# Patient Record
Sex: FEMALE | Race: WHITE | Employment: OTHER | URBAN - METROPOLITAN AREA
[De-identification: names, ages, dates, MRNs, and addresses within clinical notes are randomized per-mention and may not be internally consistent; named-entity substitution may affect disease eponyms.]

---

## 2009-09-25 LAB
CHOLEST SERPL-MCNC: 190 MG/DL
HDLC SERPL-MCNC: 80 MG/DL
LDLC SERPL CALC-MCNC: 93 MG/DL
TRIGL SERPL-MCNC: 85 MG/DL

## 2009-09-26 LAB — SYPHILIS RPR SCREEN - HISTORICAL: NORMAL

## 2016-12-13 LAB — PHQ9 SCORE: 24

## 2017-01-03 ENCOUNTER — TELEPHONE (OUTPATIENT)
Dept: FAMILY MEDICINE | Facility: CLINIC | Age: 82
End: 2017-01-03

## 2017-01-03 ENCOUNTER — OFFICE VISIT (OUTPATIENT)
Dept: FAMILY MEDICINE | Facility: CLINIC | Age: 82
End: 2017-01-03
Payer: COMMERCIAL

## 2017-01-03 VITALS
BODY MASS INDEX: 30.27 KG/M2 | WEIGHT: 155 LBS | DIASTOLIC BLOOD PRESSURE: 78 MMHG | RESPIRATION RATE: 18 BRPM | TEMPERATURE: 97.2 F | SYSTOLIC BLOOD PRESSURE: 139 MMHG | HEART RATE: 65 BPM

## 2017-01-03 DIAGNOSIS — R31.9 BLOOD IN URINE: Primary | ICD-10-CM

## 2017-01-03 DIAGNOSIS — F32.0 MAJOR DEPRESSIVE DISORDER, SINGLE EPISODE, MILD (H): ICD-10-CM

## 2017-01-03 DIAGNOSIS — F41.1 GENERALIZED ANXIETY DISORDER: ICD-10-CM

## 2017-01-03 LAB
ALBUMIN UR-MCNC: NEGATIVE MG/DL
APPEARANCE UR: CLEAR
BACTERIA #/AREA URNS HPF: ABNORMAL /HPF
BILIRUB UR QL STRIP: NEGATIVE
COLOR UR AUTO: YELLOW
GLUCOSE UR STRIP-MCNC: NEGATIVE MG/DL
HGB UR QL STRIP: ABNORMAL
KETONES UR STRIP-MCNC: NEGATIVE MG/DL
LEUKOCYTE ESTERASE UR QL STRIP: ABNORMAL
NITRATE UR QL: NEGATIVE
NON-SQ EPI CELLS #/AREA URNS LPF: ABNORMAL /LPF
PH UR STRIP: 6.5 PH (ref 5–7)
RBC #/AREA URNS AUTO: ABNORMAL /HPF (ref 0–2)
SP GR UR STRIP: 1.01 (ref 1–1.03)
URN SPEC COLLECT METH UR: ABNORMAL
UROBILINOGEN UR STRIP-ACNC: 0.2 EU/DL (ref 0.2–1)
WBC #/AREA URNS AUTO: ABNORMAL /HPF (ref 0–2)

## 2017-01-03 PROCEDURE — 81001 URINALYSIS AUTO W/SCOPE: CPT | Performed by: FAMILY MEDICINE

## 2017-01-03 PROCEDURE — 87086 URINE CULTURE/COLONY COUNT: CPT | Mod: 90 | Performed by: FAMILY MEDICINE

## 2017-01-03 PROCEDURE — 99000 SPECIMEN HANDLING OFFICE-LAB: CPT | Performed by: FAMILY MEDICINE

## 2017-01-03 PROCEDURE — 99214 OFFICE O/P EST MOD 30 MIN: CPT | Performed by: FAMILY MEDICINE

## 2017-01-03 NOTE — PATIENT INSTRUCTIONS
Thank you for choosing [facility].  You may be receiving a survey in the mail from [facility] regarding your visit today.  Please take a few minutes to complete and return the survey to let us know how we are doing.      Our Clinic hours are:  Mondays    7:20 am - 7 pm  Tues -  Fri  7:20 am - 5 pm    Clinic Phone: [phone]    The clinic lab opens at 7:30 am Mon - Fri and appointments are required.    [facility]. [phone]  Monday-Thursday 8 am - 7pm  Tues/Wed/Fri 8 am - 5:30 pm

## 2017-01-03 NOTE — NURSING NOTE
Initial /78 mmHg  Pulse 65  Temp(Src) 97.2  F (36.2  C)  Resp 18  Wt 155 lb (70.308 kg) Estimated body mass index is 30.27 kg/(m^2) as calculated from the following:    Height as of 12/10/16: 5' (1.524 m).    Weight as of this encounter: 155 lb (70.308 kg). .    Chief Complaint   Patient presents with     Urinary Problem     Jennifer Valdez CMA

## 2017-01-03 NOTE — PROGRESS NOTES
SUBJECTIVE:                                                    Lara Wills is a 89 year old female who presents to clinic today for the following health issues:      URINARY TRACT SYMPTOMS     Onset: 3 days     Description:   Painful urination (Dysuria): no   Blood in urine (Hematuria): YES-  Patient is not sure but there is blood on the toilet paper   Delay in urine (Hesitency): no     Intensity: mild    Progression of Symptoms:  same    Accompanying Signs & Symptoms:  Fever/chills: no   Flank pain no   Nausea and vomiting: no   Any vaginal symptoms: none  Abdominal/Pelvic Pain: YES   History:   History of frequent UTI's: no   History of kidney stones: no   Sexually Active: no   Possibility of pregnancy: No    Precipitating factors:   none         Therapies Tried and outcome: Increase fluid intake            Problem list and histories reviewed & adjusted, as indicated.  Additional history: as documented    Further history obtained, clarified or corrected by physician:  She comes in with her daughter-in-law for some abdominal cramping this morning and over the last few days. There is concern about possible UTI. Fairly quickly in the visit it became clear that the main issue for her is progressive worsening of her overall health condition and frequent complaints and concerns that are taxing her family. She admits that she's feeling fearful living alone and that raises her stress level and her anxiety. She thinks she should move to a nursing home but she's not sure if she would like that. The daughter-in-law believes transitional care would be a reasonable option for her and the patient agrees thinking that she could get monitoring by nursing staff, perhaps medication adjustments, and physical therapy which would all be of benefit for her.    OBJECTIVE:  LUNGS: clear to auscultation, normal breath sounds  CV: RRR without murmur  ABD: BS+, soft, nontender, no masses, no hepatosplenomegaly    UA: Few white cells and  few bacteria    ASSESSMENT:     Blood in urine  Generalized anxiety disorder  Major depressive disorder, single episode, mild (H)    PLAN:  Orders Placed This Encounter     UA reflex to Microscopic and Culture     Urine Microscopic     CARE COORDINATION REFERRAL     We had a long discussion about disposition, goals of care, plan of care options. I did put in another care coordination referral in the daughter-in-law has a phone message in to our care coordinators to try to arrange for transitional care unit admission. She likely would benefit from physical therapy at TCU as well as monitoring by nursing staff. At the very least a TCU stay may also point out whether or not she truly can get stronger and healthier. She understands in the daughter-in-law understands that this may lead to permanent placement.    40 minutes spent with patient face-to-face, 30 minutes for counseling about her overall condition, prognosis, options for care.

## 2017-01-03 NOTE — TELEPHONE ENCOUNTER
Reason for Call:  Other appointment    Detailed comments: pt's daughter in law calling stating that pt calls everyday that something is wrong. She states that she is now complaining of low abdominal pain, like menstrual cramps with some bleeding. She isn't sure if she has a uti or what's going on. She is frustrated and doesn't know how to help pt anymore and is wondering if she needs transitional care. She said she just doesn't know how to handle this anymore. She stated pt has an appt this afternoon.    Phone Number Patient can be reached at: Other phone number:  [phone]    Best Time: any    Can we leave a detailed message on this number? YES    Call taken on 1/3/2017 at 10:03 AM by Margarita Steward

## 2017-01-04 ENCOUNTER — CARE COORDINATION (OUTPATIENT)
Dept: CARE COORDINATION | Facility: CLINIC | Age: 82
End: 2017-01-04

## 2017-01-05 ENCOUNTER — TELEPHONE (OUTPATIENT)
Dept: CARE COORDINATION | Facility: CLINIC | Age: 82
End: 2017-01-05

## 2017-01-05 DIAGNOSIS — F41.1 GENERALIZED ANXIETY DISORDER: ICD-10-CM

## 2017-01-05 DIAGNOSIS — G47.00 PERSISTENT INSOMNIA: Primary | ICD-10-CM

## 2017-01-05 DIAGNOSIS — E78.5 HYPERLIPIDEMIA WITH TARGET LDL LESS THAN 130: ICD-10-CM

## 2017-01-05 LAB
BACTERIA SPEC CULT: NORMAL
MICRO REPORT STATUS: NORMAL
SPECIMEN SOURCE: NORMAL

## 2017-01-05 RX ORDER — ZOLPIDEM TARTRATE 5 MG/1
2.5 TABLET ORAL
Qty: 15 TABLET | Refills: 5 | Status: SHIPPED | OUTPATIENT
Start: 2017-01-05 | End: 2017-01-10

## 2017-01-05 NOTE — PROGRESS NOTES
Clinic Care Coordination Contact  OUTREACH    Referral Information:  JELANI placed call to pt's son, Johann.  He turned the phone over to his wife, Ahn.  Referral Source: PCP  Reason for Contact: JELANI initial assessment & TCU placement education & planning  Care Conference: No     Universal Utilization:   ED Visits in last year: 5  Hospital visits in last year: 0  Last PCP appointment: 01/04/17  Missed Appointments: 0  Concerns: TCU cares  Multiple Providers or Specialists: yes    Clinical Concerns:  Current Medical Concerns: Pt was seen by MD Du today and he recommended that pt go to TCU vs LTC  Current Behavioral Concerns: Pt has been living with anxiety since 4/2016; had a fall and since then is afraid to be alone    Education Provided to patient: JELANI and Anh discussed TCU options, such as:  cost of cares, therapies, LTC vs TCU, location of facility, and private pay vs Medicare cost.      Clinical Pathway: None    Medication Management:  Per Anh, pt is taking as prescribed.  Was seen by Loco & Huyen on 12/13/16 and medications were changed:  Buspar & Vitamin D.    Functional Status:  Mobility Status: Independent w/Device  Equipment Currently Used at Home: oxygen at night, walker, rolling  [facility] [facility] [place] [facility] [place]     Psychosocial:  Current living arrangement:: I live alone at [place] [facility]  Financial/Insurance: Social Security/ [employer]    Anh shared that pt has always been very independent, so this change in her has been troubling for the family.  Pt will call Anh & Johann several times/day, including in the middle of the night.  Pt always calms once another person is near her.  Anh shared she had scheduled [facility] to begin, but has them on hold as pt, MD and family feel that until pt's medications are adjusted and she gains some strength, a TCU stay may be better for her.     Resources and Interventions:  Current Resources: JELANI discussed  [place] Cho[place] for [place]TC once pt's funds are depleted      Advanced Care Plans/Directives on file:: Yes  Referrals Placed: TCU     Goals:   Goal 1 Statement: Pt's family wants pt in a TCU/LTC enviornment  Goal 1 Progression Percent: 40%  Goal 1 Progression Date: 01/04/17  Barriers: SW has to find and secure a TCU bed in this geographical area as family does not want to travel  Strengths: Pt has resources to privately pay for TCU cares.  JELANI discussed [place]   Patient/Caregiver understanding: Anh understands that this writer will contact TCUs in this area and search for availability.  Frequency of Care Coordination: PRN        Plan: Per conversation with Anh, it is her request that SW to contact the TCUs listed below, in order, for bed availability:  1.  [facility]--send request on 1/4/16; spoke with Nehal in admissions & they will review pt's information in the morning.  Nehal offered pt a TCU bed or a LTC bed in a shared room.  JELANI called and spoke with Anh.  Anh wants the pt to stay at the TCU and be there for a month.  JELANI explained that TCU cannot guarantee a month's stay as progression of cares is how TCUs jose length of stay.  Anh does not want pt in the LTC portion of [facility] at this time.  SW to fax pt's information on 1/5/17  [facility]  3.  [facility]--Anh is aware that [facility] does not have LTC capability  4.  [facility]    SW to continue to work on TCU placement on 1/5/17.  By way of this note, SW updating MD that TCU orders, including medications will need to be completed and signed.  Orders were emailed to MD Du for signature has he saw the pt on 1/4/17.  Orders also emailed to PCP.    Madisyn Zamora  Social Work Care Coordinator  [facility]  [phone]

## 2017-01-05 NOTE — PROGRESS NOTES
Your message said you emailed me orders, but I didn't receive anything via email to complete/sign.    Alejandra Watson M.D.

## 2017-01-05 NOTE — TELEPHONE ENCOUNTER
SW received notification from [facility] admissions stating they are full today, but will accept pt anytime after 10:00 AM on 1/6/17.  SW to continue to work with PCP and family for TCU admission.    Madisyn Zamora, Social Work Care Coordinator, [phone]

## 2017-01-05 NOTE — PROGRESS NOTES
Addendum at 11:07 am:  JELANI received phone call from Anh.  JELANI and Anh discussed pt's admission into [facility] TCU tomorrow, 1/6/17 at 1PM, as this will give pt & family time to gather items for TCU stay and the Care Team to get all necessary paperwork in order.    Anh asked if she can bring pt's personal medications, if she gets them checked by [facility] staff.  Anh also asked if pt should keep her Psych ACNP appt on 1/27 at [facility].  JELANI contacted [facility] admissions with these questions--awaiting return call.  Will let Anh know answers.    JELANI also informed Anh that [facility] will require a $5000 check at time of admission.  Anh states she is prepared to bring the check with the pt tomorrow.    SW to continue to follow.    Clinic Care Coordination Contact  Care Team Conversations    JELANI called and spoke with pt's son, Harvey.  Left message asking Anh to return my call.  JELANI informed Harvey that pt is accepted to [facility] TCU for tomorrow, January 6, 2017 after 10:00 a.m.    JELANI to continue to work with PCP, family and [facility] admissions team for TCU admission.    Madisyn Zamora, Social Work Care Coordinator, [phone]

## 2017-01-05 NOTE — TELEPHONE ENCOUNTER
Dr. Watson, please add pt's Psych ACNP appt to her [place] orders.  The appointment is on 1/27/17.  Thank you.

## 2017-01-10 ENCOUNTER — NURSING HOME VISIT (OUTPATIENT)
Dept: GERIATRICS | Facility: CLINIC | Age: 82
End: 2017-01-10
Payer: COMMERCIAL

## 2017-01-10 VITALS
HEART RATE: 96 BPM | TEMPERATURE: 98.2 F | DIASTOLIC BLOOD PRESSURE: 66 MMHG | RESPIRATION RATE: 17 BRPM | OXYGEN SATURATION: 93 % | SYSTOLIC BLOOD PRESSURE: 126 MMHG

## 2017-01-10 DIAGNOSIS — N39.0 URINARY TRACT INFECTION WITHOUT HEMATURIA, SITE UNSPECIFIED: Primary | ICD-10-CM

## 2017-01-10 DIAGNOSIS — F32.1 MODERATE SINGLE CURRENT EPISODE OF MAJOR DEPRESSIVE DISORDER (H): ICD-10-CM

## 2017-01-10 DIAGNOSIS — F51.01 PRIMARY INSOMNIA: ICD-10-CM

## 2017-01-10 DIAGNOSIS — F41.1 GAD (GENERALIZED ANXIETY DISORDER): ICD-10-CM

## 2017-01-10 PROCEDURE — 99207 ZZC CDG-CORRECTLY CODED, REVIEWED AND AGREE: CPT | Performed by: FAMILY MEDICINE

## 2017-01-10 PROCEDURE — 99306 1ST NF CARE HIGH MDM 50: CPT | Performed by: FAMILY MEDICINE

## 2017-01-10 RX ORDER — POLYETHYLENE GLYCOL 3350 17 G/17G
17 POWDER, FOR SOLUTION ORAL DAILY
COMMUNITY
End: 2017-05-18

## 2017-01-10 RX ORDER — BISACODYL 10 MG
10 SUPPOSITORY, RECTAL RECTAL DAILY PRN
COMMUNITY
End: 2017-05-18

## 2017-01-10 NOTE — PROGRESS NOTES
[place] [facility]  PRIMARY CARE PROVIDER AND CLINIC:  Alejandra Watson [facility] [id number] [address] / [facility] [address]  Chief Complaint   Patient presents with     Hospital F/U     Nursing Home Acute       HPI:    Lara Wills is a 89 year old  (6/12/1922),admitted to the [facility]  from Home.    Admitted to this facility for  rehab, medical management and nursing care. Current issues are:         Urinary tract infection  VIRY (generalized anxiety disorder)  Primary insomnia  Moderate single current episode of major depressive disorder (H)     Patient has been living in [facility]. Was doing well until July 2016 when she has shown s/s of depression - anhedonia, lethargy, sadness, loss oif hope, and sleep disturbance. Family hopes to address her psych meds and new disposition.  Has seen perico ROSARIO at [facility] in mid- dec 2016 -and at that visit her Buspar wsa increased to 10 mg po bid from 5mg po bid.     CODE STATUS/ADVANCE DIRECTIVES DISCUSSION:   DNR / DNI  Patient's living condition: lives alone    ALLERGIES:Codeine sulfate and Penicillins  PAST MEDICAL HISTORY:  has no past medical history on file.  PAST SURGICAL HISTORY:  has past surgical history that includes appendectomy (1947); breast biopsy, rt/lt; partial thyroidectomy; cataract iol, rt/lt; and vitrectomy,strip epiretinal membrane (2002).  FAMILY HISTORY: family history includes Alzheimer Disease in her brother; C.A.D. in her mother; CANCER in her daughter; Cancer - colorectal in her father; Neurologic Disorder in her brother.  SOCIAL HISTORY:  reports that she has never smoked. She has never used smokeless tobacco. She reports that she does not drink alcohol or use illicit drugs.    Post Discharge Medication Reconciliation Status: discharge medications reconciled and changed, per note/orders (see AVS).  Current Outpatient Prescriptions   Medication Sig Dispense Refill     bisacodyl (DULCOLAX) 10 MG Suppository  Place 10 mg rectally daily as needed for constipation       magnesium hydroxide (MILK OF MAGNESIA) 400 MG/5ML suspension Take 30 mLs by mouth daily as needed for constipation or heartburn       polyethylene glycol (MIRALAX/GLYCOLAX) Packet Take 17 g by mouth daily       zolpidem (AMBIEN) 5 MG tablet Take 0.5 tablets (2.5 mg) by mouth nightly as needed 15 tablet 5     order for DME Oxygen 2 Li/min  at night       Vitamin D, Cholecalciferol, 1000 UNITS TABS Take 1,000 Units by mouth daily        levothyroxine (SYNTHROID, LEVOTHROID) 25 MCG tablet Take 1 tablet (25 mcg) by mouth daily 90 tablet 3     losartan (COZAAR) 25 MG tablet Take 1 tablet (25 mg) by mouth 2 times daily 180 tablet 3     amLODIPine (NORVASC) 5 MG tablet Take 1 tablet (5 mg) by mouth 2 times daily 180 tablet 3     latanoprost (XALATAN) 0.005 % ophthalmic solution Place 1 drop into both eyes At Bedtime 1 Bottle 4     atenolol (TENORMIN) 25 MG tablet Take 1 tablet (25 mg) by mouth 2 times daily 180 tablet 3     Lovastatin 40 MG TB24 Take 40 mg by mouth At Bedtime 90 tablet 3     citalopram (CELEXA) 20 MG tablet Take 1 tablet (20 mg) by mouth daily 90 tablet 3     busPIRone (BUSPAR) 5 MG tablet Take 1 tablet (5 mg) by mouth 2 times daily 180 tablet 3     multivitamin  with lutein (OCUVITE WITH LTEIN) CAPS Take 1 capsule by mouth daily       calcium-vitamin D (CALTRATE) 600-400 MG-UNIT per tablet Take 1 tablet by mouth 2 times daily       docusate sodium (COLACE) 100 MG capsule Take 100 mg by mouth 3 times daily       aspirin-acetaminophen-caffeine (EXCEDRIN EXTRA STRENGTH) 250-250-65 MG per tablet Take 1 tablet by mouth every 6 hours as needed         ROS: admits to insomnia - chronic - tales Ambien prn with good response   10 point ROS of systems including Constitutional, Eyes, Respiratory, Cardiovascular, Gastroenterology, Genitourinary, Integumentary, Muscularskeletal, Psychiatric were all negative except for pertinent positives noted in my  HPI.    Exam:  /66 mmHg  Pulse 96  Temp(Src) 98.2  F (36.8  C)  Resp 17  SpO2 93%  Gen: sitting in chair, alert, cooperative and in no acute distress  HEENT: normocephalic; oropharynx clear  Card: RRR, S1, S2, no murmurs  Resp: lungs clear to auscultation bilaterally, no wheezes or crackles  GI: abdomen soft, not-tender  MSK: normal muscle tone, no LE edema. Walks with a walker   Neuro: CX II-XII grossly in tact; ROM in all four extremities grossly in tact  Psych: alert and oriented x3; normal affect  Skin: warm, no suspicious rashes or lesions noted      Lab/Diagnostic data:     WBC   Date Value Ref Range Status   12/10/2016 7.4 4.0 - 11.0 10e9/L Final   ]  HEMOGLOBIN   Date Value Ref Range Status   12/10/2016 13.2 11.7 - 15.7 g/dL Final   11/24/2016 12.9 11.7 - 15.7 g/dL Final   ]  Last Basic Metabolic Panel:  NA      140   12/10/2016   POTASSIUM      3.8   12/10/2016  EVA      9.0   12/10/2016  CO2       24   12/10/2016  BUN       21   12/10/2016  CR     0.96   12/10/2016  GLC      103   12/10/2016    ASSESSMENT/PLAN:  1. Urinary tract infection without hematuria, site unspecified    2. VIRY (generalized anxiety disorder)    3. Primary insomnia    4. Moderate single current episode of major depressive disorder (H)          Orders:  1. Please obtain [facility] notes from last 6 months  2. F/U with Psych at [facility] at  [facility] on 1/24/17 [phone]  3. [place] psych consult to help determine if medication adjustment should be more focused on anxiety vs depression  4. Increase Ambiem to 5.0 mg po qhs and d/c prn ambien  5. SW- please do depression screen and update Dr Randle  6. Sleep log x 72 hrs  7. F/U with Dr Randle on 1/13/17 to readdress mood disorder/sleep.    Spoke with perico her NP at [facility] this evening.  Reviewed my findings with her. . We both collaborated ans agreed to decrease celexa 10mg po daily x 5 days, then off startting 1/11/17. Start Cymbalta 20 mg po daily for depression.   Patient would  do better in a different living arrangement on wait list for harjit PETERSON      Information reviewed:  Medications, vital signs, orders, nursing notes, problem list, hospital information. Total time spent with patient visit1.  was 45 min including patient visit and review of past records. Greater than 50% of total time spent with counseling and coordinating care.    Electronically signed by:  Savita Randle MD, MD

## 2017-01-12 VITALS
BODY MASS INDEX: 30.27 KG/M2 | OXYGEN SATURATION: 96 % | WEIGHT: 155 LBS | SYSTOLIC BLOOD PRESSURE: 145 MMHG | DIASTOLIC BLOOD PRESSURE: 75 MMHG | HEART RATE: 60 BPM | RESPIRATION RATE: 16 BRPM | TEMPERATURE: 97.9 F

## 2017-01-12 NOTE — PROGRESS NOTES
[place] [facility]    Chief Complaint   Patient presents with     Nursing Home Acute       HPI:    Lara Wills is a 89 year old  (6/12/1922), who is being seen today for an episodic care visit at [facility] . Today's concern is:  1. Urinary tract infection without hematuria, site unspecified - She complains of urinary frequency, urgency and dysuria, without flank pain, fever, chills, or abnormal vaginal discharge or bleeding.     2. VIRY (generalized anxiety disorder) -- corresponding with her psych NP to make some medication  chnages to help with anxiety and anhedonia   3. Gastroenteritis due to norovirus - feels nausea x 1 day       ALLERGIES: Codeine sulfate and Penicillins  Past Medical, Surgical, Family and Social History reviewed and updated in EPIC.    Current Outpatient Prescriptions   Medication Sig Dispense Refill     bisacodyl (DULCOLAX) 10 MG Suppository Place 10 mg rectally daily as needed for constipation       magnesium hydroxide (MILK OF MAGNESIA) 400 MG/5ML suspension Take 30 mLs by mouth daily as needed for constipation or heartburn       polyethylene glycol (MIRALAX/GLYCOLAX) Packet Take 17 g by mouth daily       Zolpidem Tartrate (AMBIEN PO) Take 5 mg by mouth At Bedtime       order for DME Oxygen 2 Li/min  at night       Vitamin D, Cholecalciferol, 1000 UNITS TABS Take 1,000 Units by mouth daily        levothyroxine (SYNTHROID, LEVOTHROID) 25 MCG tablet Take 1 tablet (25 mcg) by mouth daily 90 tablet 3     losartan (COZAAR) 25 MG tablet Take 1 tablet (25 mg) by mouth 2 times daily 180 tablet 3     amLODIPine (NORVASC) 5 MG tablet Take 1 tablet (5 mg) by mouth 2 times daily 180 tablet 3     latanoprost (XALATAN) 0.005 % ophthalmic solution Place 1 drop into both eyes At Bedtime 1 Bottle 4     atenolol (TENORMIN) 25 MG tablet Take 1 tablet (25 mg) by mouth 2 times daily 180 tablet 3     Lovastatin 40 MG TB24 Take 40 mg by mouth At Bedtime 90 tablet 3     citalopram  (CELEXA) 20 MG tablet Take 1 tablet (20 mg) by mouth daily 90 tablet 3     busPIRone (BUSPAR) 5 MG tablet Take 1 tablet (5 mg) by mouth 2 times daily 180 tablet 3     multivitamin  with lutein (OCUVITE WITH LTEIN) CAPS Take 1 capsule by mouth daily       calcium-vitamin D (CALTRATE) 600-400 MG-UNIT per tablet Take 1 tablet by mouth 2 times daily       docusate sodium (COLACE) 100 MG capsule Take 100 mg by mouth 3 times daily       aspirin-acetaminophen-caffeine (EXCEDRIN EXTRA STRENGTH) 250-250-65 MG per tablet Take 1 tablet by mouth every 6 hours as needed       Medications reviewed:  Medications reconciled to facility chart and changes were made to reflect current medications as identified as above med list. Below are the changes that were made:   Medications stopped since last EPIC medication reconciliation:   There are no discontinued medications.    Medications started since last [facility] medication reconciliation:  No orders of the defined types were placed in this encounter.         REVIEW OF SYSTEMS:  10 point ROS of systems including Constitutional, Eyes, Respiratory, Cardiovascular, Gastroenterology, Genitourinary, Integumentary, Muscularskeletal, Psychiatric were all negative except for pertinent positives noted in my HPI.    Physical Exam:  /75 mmHg  Pulse 60  Temp(Src) 97.9  F (36.6  C)  Resp 16  Wt 155 lb (70.308 kg)  SpO2 96%  Gen: sitting in chair, alert, cooperative and in no acute distress  HEENT: normocephalic; oropharynx clear  Card: RRR, S1, S2, no murmurs  Resp: lungs clear to auscultation bilaterally, no wheezes or crackles  GI: abdomen soft, not-tender  MSK: normal muscle tone, no LE edema  Neuro: CX II-XII grossly in tact; ROM in all four extremities grossly in tact  Psych: alert and oriented x3; normal affect  Skin: warm, no suspicious rashes or lesions noted      Recent Labs:   Results for orders placed or performed in visit on 01/03/17   UA reflex to Microscopic and Culture    Result Value Ref Range    Color Urine Yellow     Appearance Urine Clear     Glucose Urine Negative NEG mg/dL    Bilirubin Urine Negative NEG    Ketones Urine Negative NEG mg/dL    Specific Gravity Urine 1.010 1.003 - 1.035    Blood Urine Small (A) NEG    pH Urine 6.5 5.0 - 7.0 pH    Protein Albumin Urine Negative NEG mg/dL    Urobilinogen Urine 0.2 0.2 - 1.0 EU/dL    Nitrite Urine Negative NEG    Leukocyte Esterase Urine Small (A) NEG    Source Midstream Urine    Urine Microscopic   Result Value Ref Range    WBC Urine 5-10 (A) 0 - 2 /HPF    RBC Urine O - 2 0 - 2 /HPF    Squamous Epithelial /LPF Urine Few FEW /LPF    Bacteria Urine Few (A) NEG /HPF   Urine Culture Aerobic Bacterial   Result Value Ref Range    Specimen Description Midstream Urine     Culture Micro       10,000 to 50,000 colonies/mL mixed urogenital chapo    Micro Report Status FINAL 01/05/2017            Assessment/Plan:  also  1. Urinary tract infection without hematuria, site unspecified    2. VIRY (generalized anxiety disorder)    3. Gastroenteritis due to norovirus               Patient Instructions   1) Spoke with Huber Vicente NP her mental health provider-       Plan to taper Celexa and start Cymbalta on 1/10/2017       DX: Moderate Depression    2) Resolving stomach flu      Push fluids every shift    3) Zofran 4 mg po       Q 6 prn Nausea          Electronically signed by  Savita Randle MD, MD

## 2017-01-13 ENCOUNTER — NURSING HOME VISIT (OUTPATIENT)
Dept: GERIATRICS | Facility: CLINIC | Age: 82
End: 2017-01-13
Payer: COMMERCIAL

## 2017-01-13 DIAGNOSIS — A08.11 GASTROENTERITIS DUE TO NOROVIRUS: ICD-10-CM

## 2017-01-13 DIAGNOSIS — N39.0 URINARY TRACT INFECTION WITHOUT HEMATURIA, SITE UNSPECIFIED: Primary | ICD-10-CM

## 2017-01-13 DIAGNOSIS — F41.1 GAD (GENERALIZED ANXIETY DISORDER): ICD-10-CM

## 2017-01-13 PROCEDURE — 99309 SBSQ NF CARE MODERATE MDM 30: CPT | Performed by: FAMILY MEDICINE

## 2017-01-13 PROCEDURE — 99207 ZZC CDG-CORRECTLY CODED, REVIEWED AND AGREE: CPT | Performed by: FAMILY MEDICINE

## 2017-01-16 NOTE — PATIENT INSTRUCTIONS
1) Spoke with Huber Vicente NP her mental health provider-       Plan to taper Celexa and start Cymbalta on 1/10/2017       DX: Moderate Depression    2) Resolving stomach flu      Push fluids every shift    3) Zofran 4 mg po       Q 6 prn Nausea

## 2017-01-17 ENCOUNTER — NURSING HOME VISIT (OUTPATIENT)
Dept: GERIATRICS | Facility: CLINIC | Age: 82
End: 2017-01-17
Payer: COMMERCIAL

## 2017-01-17 VITALS
BODY MASS INDEX: 30.27 KG/M2 | OXYGEN SATURATION: 94 % | HEART RATE: 63 BPM | TEMPERATURE: 98.3 F | WEIGHT: 155 LBS | DIASTOLIC BLOOD PRESSURE: 67 MMHG | RESPIRATION RATE: 20 BRPM | SYSTOLIC BLOOD PRESSURE: 120 MMHG

## 2017-01-17 DIAGNOSIS — F41.1 GAD (GENERALIZED ANXIETY DISORDER): Primary | ICD-10-CM

## 2017-01-17 DIAGNOSIS — I10 BENIGN ESSENTIAL HYPERTENSION: ICD-10-CM

## 2017-01-17 DIAGNOSIS — F51.01 PRIMARY INSOMNIA: ICD-10-CM

## 2017-01-17 PROCEDURE — 99207 ZZC CDG-CORRECTLY CODED, REVIEWED AND AGREE: CPT | Performed by: FAMILY MEDICINE

## 2017-01-17 PROCEDURE — 99309 SBSQ NF CARE MODERATE MDM 30: CPT | Performed by: FAMILY MEDICINE

## 2017-01-17 NOTE — PROGRESS NOTES
[facility]    Chief Complaint   Patient presents with     Nursing Home Acute       HPI:    Lara Wills is a 89 year old  (6/12/1922), who is being seen today for an episodic care visit at [facility] . Today's concern is:     Primary insomnia-- started on scheduled Ambien helping. 72 hr log showed start of improvement in sleep.   VIRY (generalized anxiety disorder)- spoke woth her psych NP. Both agreed to taper off  Celexa and start Cymbalta 20 mg po daily.  Thus far- She has not experienced any significant side effects of this medication.    Benign essential hypertension-- patient ha high blood pressure when anxious.  Blood pressure have been running 120-150s SBP- Patient denies any exertional chest pain, dyspnea, palpitations, syncope, orthopnea, edema or paroxysmal nocturnal dyspnea.      ALLERGIES: Codeine sulfate and Penicillins  Past Medical, Surgical, Family and Social History reviewed and updated in EPIC.    Current Outpatient Prescriptions   Medication Sig Dispense Refill     bisacodyl (DULCOLAX) 10 MG Suppository Place 10 mg rectally daily as needed for constipation       magnesium hydroxide (MILK OF MAGNESIA) 400 MG/5ML suspension Take 30 mLs by mouth daily as needed for constipation or heartburn       polyethylene glycol (MIRALAX/GLYCOLAX) Packet Take 17 g by mouth daily       Zolpidem Tartrate (AMBIEN PO) Take 5 mg by mouth At Bedtime       order for DME Oxygen 2 Li/min  at night       Vitamin D, Cholecalciferol, 1000 UNITS TABS Take 1,000 Units by mouth daily        levothyroxine (SYNTHROID, LEVOTHROID) 25 MCG tablet Take 1 tablet (25 mcg) by mouth daily 90 tablet 3     losartan (COZAAR) 25 MG tablet Take 1 tablet (25 mg) by mouth 2 times daily 180 tablet 3     amLODIPine (NORVASC) 5 MG tablet Take 1 tablet (5 mg) by mouth 2 times daily 180 tablet 3     latanoprost (XALATAN) 0.005 % ophthalmic solution Place 1 drop into both eyes At Bedtime 1 Bottle 4     atenolol  (TENORMIN) 25 MG tablet Take 1 tablet (25 mg) by mouth 2 times daily 180 tablet 3     Lovastatin 40 MG TB24 Take 40 mg by mouth At Bedtime 90 tablet 3     citalopram (CELEXA) 20 MG tablet Take 1 tablet (20 mg) by mouth daily 90 tablet 3     busPIRone (BUSPAR) 5 MG tablet Take 1 tablet (5 mg) by mouth 2 times daily 180 tablet 3     multivitamin  with lutein (OCUVITE WITH LTEIN) CAPS Take 1 capsule by mouth daily       calcium-vitamin D (CALTRATE) 600-400 MG-UNIT per tablet Take 1 tablet by mouth 2 times daily       docusate sodium (COLACE) 100 MG capsule Take 100 mg by mouth 3 times daily       aspirin-acetaminophen-caffeine (EXCEDRIN EXTRA STRENGTH) 250-250-65 MG per tablet Take 1 tablet by mouth every 6 hours as needed       Medications reviewed:  Medications reconciled to facility chart and changes were made to reflect current medications as identified as above med list. Below are the changes that were made:   Medications stopped since last EPIC medication reconciliation:   There are no discontinued medications.    Medications started since last [facility] medication reconciliation:  No orders of the defined types were placed in this encounter.         REVIEW OF SYSTEMS:  10 point ROS of systems including Constitutional, Eyes, Respiratory, Cardiovascular, Gastroenterology, Genitourinary, Integumentary, Muscularskeletal, Psychiatric were all negative except for pertinent positives noted in my HPI.    Physical Exam:  /67 mmHg  Pulse 63  Temp(Src) 98.3  F (36.8  C)  Resp 20  Wt 155 lb (70.308 kg)  SpO2 94%  Gen: sitting in chair, alert, cooperative and in no acute distress  HEENT: normocephalic; oropharynx clear  Card: RRR, S1, S2, no murmurs  Resp: lungs clear to auscultation bilaterally, no wheezes or crackles  GI: abdomen soft, not-tender  MSK: normal muscle tone, no LE edema  Neuro: CX II-XII grossly in tact; ROM in all four extremities grossly in tact  Psych: alert and oriented x3; normal affect  Skin:  warm, no suspicious rashes or lesions noted      Recent Labs:    Results for orders placed or performed in visit on 01/03/17   UA reflex to Microscopic and Culture   Result Value Ref Range    Color Urine Yellow     Appearance Urine Clear     Glucose Urine Negative NEG mg/dL    Bilirubin Urine Negative NEG    Ketones Urine Negative NEG mg/dL    Specific Gravity Urine 1.010 1.003 - 1.035    Blood Urine Small (A) NEG    pH Urine 6.5 5.0 - 7.0 pH    Protein Albumin Urine Negative NEG mg/dL    Urobilinogen Urine 0.2 0.2 - 1.0 EU/dL    Nitrite Urine Negative NEG    Leukocyte Esterase Urine Small (A) NEG    Source Midstream Urine    Urine Microscopic   Result Value Ref Range    WBC Urine 5-10 (A) 0 - 2 /HPF    RBC Urine O - 2 0 - 2 /HPF    Squamous Epithelial /LPF Urine Few FEW /LPF    Bacteria Urine Few (A) NEG /HPF   Urine Culture Aerobic Bacterial   Result Value Ref Range    Specimen Description Midstream Urine     Culture Micro       10,000 to 50,000 colonies/mL mixed urogenital chapo    Micro Report Status FINAL 01/05/2017          Assessment/Plan:     Primary insomnia  VIRY (generalized anxiety disorder)  Benign essential hypertension    Orders:  1. Anticipate d/c to [facility] when bed available  2. F/U with Huber Vicente NP psych on 1/24/17  3. HTN stable  4. Insomnia - Continue scheduled ambien        Electronically signed by  Savita Randle MD, MD

## 2017-01-24 ENCOUNTER — NURSING HOME VISIT (OUTPATIENT)
Dept: GERIATRICS | Facility: CLINIC | Age: 82
End: 2017-01-24
Payer: COMMERCIAL

## 2017-01-24 VITALS
TEMPERATURE: 97.8 F | RESPIRATION RATE: 18 BRPM | SYSTOLIC BLOOD PRESSURE: 158 MMHG | HEART RATE: 65 BPM | BODY MASS INDEX: 30.08 KG/M2 | WEIGHT: 154 LBS | DIASTOLIC BLOOD PRESSURE: 78 MMHG | OXYGEN SATURATION: 94 %

## 2017-01-24 DIAGNOSIS — F41.1 GAD (GENERALIZED ANXIETY DISORDER): ICD-10-CM

## 2017-01-24 DIAGNOSIS — M62.81 GENERALIZED MUSCLE WEAKNESS: Primary | ICD-10-CM

## 2017-01-24 DIAGNOSIS — I10 BENIGN ESSENTIAL HYPERTENSION: ICD-10-CM

## 2017-01-24 DIAGNOSIS — N39.0 URINARY TRACT INFECTION WITHOUT HEMATURIA, SITE UNSPECIFIED: ICD-10-CM

## 2017-01-24 DIAGNOSIS — F32.0 MAJOR DEPRESSIVE DISORDER, SINGLE EPISODE, MILD (H): ICD-10-CM

## 2017-01-24 PROCEDURE — 99316 NF DSCHRG MGMT 30 MIN+: CPT | Performed by: FAMILY MEDICINE

## 2017-01-24 PROCEDURE — 99207 ZZC CDG-CORRECTLY CODED, REVIEWED AND AGREE: CPT | Performed by: FAMILY MEDICINE

## 2017-01-24 NOTE — PROGRESS NOTES
[facility] DISCHARGE SUMMARY    PATIENT'S NAME: Lara Wills  YOB: 1922  MEDICAL RECORD NUMBER:  [id number]    PRIMARY CARE PROVIDER AND CLINIC RESPONSIBLE AFTER TRANSFER: Alejandra Watson [facility] [address] [facility] [address]     CODE STATUS/ADVANCE DIRECTIVES DISCUSSION:   DNR / DNI       Allergies   Allergen Reactions     Codeine Sulfate Unknown     Penicillins Unknown       TRANSFERRING PROVIDERS: Savita Randle MD, MD, , MD  DATE OF SNF ADMISSION:  January / 06 / 2017  DATE OF SNF (anticipated) DISCHARGE: January / 26 / 2017  DISCHARGE DISPOSITION: [facility] Provider   Nursing Facility: [facility]    Home     Condition on Discharge:  Improving.  Function:    Cognitive Scores: CPT 5.0    Equipment: walker    DISCHARGE DIAGNOSIS:   1. Generalized muscle weakness    2. VIRY (generalized anxiety disorder)    3. Urinary tract infection without hematuria, site unspecified    4. Major depressive disorder, single episode, mild (H)    5. Benign essential hypertension        [facility] Facility Course:  Patient was admitted to facility after hospitalization for UTI and Anxiety.  Patient participated in PT/OT.  Patient will discharge to [facility] with in home services of PT/OT with [facility].       PAST MEDICAL HISTORY:  has no past medical history on file.    DISCHARGE MEDICATIONS:  Current Outpatient Prescriptions   Medication Sig Dispense Refill     bisacodyl (DULCOLAX) 10 MG Suppository Place 10 mg rectally daily as needed for constipation       magnesium hydroxide (MILK OF MAGNESIA) 400 MG/5ML suspension Take 30 mLs by mouth daily as needed for constipation or heartburn       polyethylene glycol (MIRALAX/GLYCOLAX) Packet Take 17 g by mouth daily       Zolpidem Tartrate (AMBIEN PO) Take 5 mg by mouth At Bedtime       order for DME Oxygen 2 Li/min  at night       Vitamin D, Cholecalciferol, 1000 UNITS TABS Take 1,000 Units by mouth daily         levothyroxine (SYNTHROID, LEVOTHROID) 25 MCG tablet Take 1 tablet (25 mcg) by mouth daily 90 tablet 3     losartan (COZAAR) 25 MG tablet Take 1 tablet (25 mg) by mouth 2 times daily 180 tablet 3     amLODIPine (NORVASC) 5 MG tablet Take 1 tablet (5 mg) by mouth 2 times daily 180 tablet 3     latanoprost (XALATAN) 0.005 % ophthalmic solution Place 1 drop into both eyes At Bedtime 1 Bottle 4     atenolol (TENORMIN) 25 MG tablet Take 1 tablet (25 mg) by mouth 2 times daily 180 tablet 3     Lovastatin 40 MG TB24 Take 40 mg by mouth At Bedtime 90 tablet 3     citalopram (CELEXA) 20 MG tablet Take 1 tablet (20 mg) by mouth daily 90 tablet 3     busPIRone (BUSPAR) 5 MG tablet Take 1 tablet (5 mg) by mouth 2 times daily 180 tablet 3     multivitamin  with lutein (OCUVITE WITH LTEIN) CAPS Take 1 capsule by mouth daily       calcium-vitamin D (CALTRATE) 600-400 MG-UNIT per tablet Take 1 tablet by mouth 2 times daily       docusate sodium (COLACE) 100 MG capsule Take 100 mg by mouth 3 times daily       aspirin-acetaminophen-caffeine (EXCEDRIN EXTRA STRENGTH) 250-250-65 MG per tablet Take 1 tablet by mouth every 6 hours as needed         MEDICATION CHANGES/RATIONALE:   D/c Celexa and started Cymbalta. Schedule her Ambien qhs with improvement of sleep.   Controlled medications sent with patient: not applicable/none     ROS:    10 point ROS of systems including Constitutional, Eyes, Respiratory, Cardiovascular, Gastroenterology, Genitourinary, Integumentary, Muscularskeletal, Psychiatric were all negative except for pertinent positives noted in my HPI.    Physical Exam:   Vitals: /78 mmHg  Pulse 65  Temp(Src) 97.8  F (36.6  C)  Resp 18  Wt 154 lb (69.854 kg)  SpO2 94%  BMI= Body mass index is 30.08 kg/(m^2).    Gen: sitting in chair, alert, cooperative and in no acute distress  HEENT: normocephalic; oropharynx clear  Card: RRR, S1, S2, no murmurs  Resp: lungs clear to auscultation bilaterally, no wheezes or  crackles  GI: abdomen soft, not-tender  MSK: normal muscle tone, no LE edema  Neuro: CX II-XII grossly in tact; ROM in all four extremities grossly in tact  Psych: alert and oriented x3; normal affect  Skin: warm, no suspicious rashes or lesions noted      DISCHARGE PLAN:  Occupational Therapy, Physical Therapy and From:  [facility]  Patient instructed to follow-up with:  PCP in 1-2 weeks       Current [place] scheduled appointments:  No future appointments.        Pending labs: none  [facility] labs   Results for orders placed or performed in visit on 01/03/17   UA reflex to Microscopic and Culture   Result Value Ref Range    Color Urine Yellow     Appearance Urine Clear     Glucose Urine Negative NEG mg/dL    Bilirubin Urine Negative NEG    Ketones Urine Negative NEG mg/dL    Specific Gravity Urine 1.010 1.003 - 1.035    Blood Urine Small (A) NEG    pH Urine 6.5 5.0 - 7.0 pH    Protein Albumin Urine Negative NEG mg/dL    Urobilinogen Urine 0.2 0.2 - 1.0 EU/dL    Nitrite Urine Negative NEG    Leukocyte Esterase Urine Small (A) NEG    Source Midstream Urine    Urine Microscopic   Result Value Ref Range    WBC Urine 5-10 (A) 0 - 2 /HPF    RBC Urine O - 2 0 - 2 /HPF    Squamous Epithelial /LPF Urine Few FEW /LPF    Bacteria Urine Few (A) NEG /HPF   Urine Culture Aerobic Bacterial   Result Value Ref Range    Specimen Description Midstream Urine     Culture Micro       10,000 to 50,000 colonies/mL mixed urogenital chapo    Micro Report Status FINAL 01/05/2017        Discharge Treatments:    1. Okay to discharge to [facility].  2. F/U with Dr Watson in 1-2 weeks  3. F/U Kira ROSARIO at [facility] for further titration increase of cymbalta Dx MDD    TOTAL DISCHARGE TIME:   Greater than 30 minutes  Electronically signed by:  Savita Randle MD, MD     Documentation of Face to Face and Certification for Home Health Services    I certify that patient: Lara Wills is under my care and that I, or a nurse practitioner or physician's  assistant working with me, had a face-to-face encounter that meets the physician face-to-face encounter requirements with this patient on: 1/24/2017.    This encounter with the patient was in whole, or in part, for the following medical condition, which is the primary reason for home health care: VIRY and UTI..    I certify that, based on my findings, the following services are medically necessary home health services: Occupational Therapy and Physical Therapy.    My clinical findings support the need for the above services because: Occupational Therapy Services are needed to assess and treat cognitive ability and address ADL safety due to impairment in  VIRY and UTI. and Physical Therapy Services are needed to assess and treat the following functional impairments:  VIRY and UTI.    Further, I certify that my clinical findings support that this patient is homebound (i.e. absences from home require considerable and taxing effort and are for medical reasons or [facility] services or infrequently or of short duration when for other reasons) because: Mental health symptoms including: Mental health condition is exacerbated by exposure to crowds, unfamiliar environment or new stressful situations...    Based on the above findings. I certify that this patient is confined to the home and needs intermittent skilled nursing care, physical therapy and/or speech therapy.  The patient is under my care, and I have initiated the establishment of the plan of care.  This patient will be followed by a physician who will periodically review the plan of care.  Physician/Provider to provide follow up care: Alejandra Watson    Attending hospital physician (the Medicare certified [facility] provider): Savita Randle MD  Physician Signature: See electronic signature associated with these discharge orders.  Date: 1/24/2017

## 2017-01-30 ENCOUNTER — TRANSFERRED RECORDS (OUTPATIENT)
Dept: HEALTH INFORMATION MANAGEMENT | Facility: CLINIC | Age: 82
End: 2017-01-30

## 2017-01-30 ENCOUNTER — TELEPHONE (OUTPATIENT)
Dept: FAMILY MEDICINE | Facility: CLINIC | Age: 82
End: 2017-01-30

## 2017-01-30 DIAGNOSIS — M62.81 GENERALIZED MUSCLE WEAKNESS: ICD-10-CM

## 2017-01-30 DIAGNOSIS — M17.9 OA (OSTEOARTHRITIS) OF KNEE: Primary | ICD-10-CM

## 2017-01-30 LAB — PHQ9 SCORE: 16

## 2017-01-30 NOTE — TELEPHONE ENCOUNTER
Referral was entered.   Writer called [facility] to get fax number to fax referral over.   [facility] PT therapist stated that we did not need to send the referral that [facility] will be faxing over paper work for provider to sign.   Encounter closed.

## 2017-01-30 NOTE — TELEPHONE ENCOUNTER
Reason for Call:  Other orders    Detailed comments: Home Care calling for orders for PT 2 times a week for 4 weeks and 1 time a week for 2 weeks.    Phone Number nurse can be reached at: Other phone number:  [phone]    Best Time: any    Can we leave a detailed message on this number? YES    Call taken on 1/30/2017 at 8:47 AM by Ayla Vazquez

## 2017-02-06 PROCEDURE — G0180 MD CERTIFICATION HHA PATIENT: HCPCS | Performed by: FAMILY MEDICINE

## 2017-02-16 ENCOUNTER — MEDICAL CORRESPONDENCE (OUTPATIENT)
Dept: HEALTH INFORMATION MANAGEMENT | Facility: CLINIC | Age: 82
End: 2017-02-16

## 2017-02-22 ENCOUNTER — MEDICAL CORRESPONDENCE (OUTPATIENT)
Dept: HEALTH INFORMATION MANAGEMENT | Facility: CLINIC | Age: 82
End: 2017-02-22

## 2017-03-01 ENCOUNTER — TRANSFERRED RECORDS (OUTPATIENT)
Dept: HEALTH INFORMATION MANAGEMENT | Facility: CLINIC | Age: 82
End: 2017-03-01

## 2017-03-20 ENCOUNTER — MEDICAL CORRESPONDENCE (OUTPATIENT)
Dept: HEALTH INFORMATION MANAGEMENT | Facility: CLINIC | Age: 82
End: 2017-03-20

## 2017-04-10 ENCOUNTER — DOCUMENTATION ONLY (OUTPATIENT)
Dept: OTHER | Facility: CLINIC | Age: 82
End: 2017-04-10

## 2017-04-10 DIAGNOSIS — Z71.89 ADVANCED DIRECTIVES, COUNSELING/DISCUSSION: Chronic | ICD-10-CM

## 2017-04-17 DIAGNOSIS — F32.0 MAJOR DEPRESSIVE DISORDER, SINGLE EPISODE, MILD (H): ICD-10-CM

## 2017-04-17 DIAGNOSIS — F41.1 GENERALIZED ANXIETY DISORDER: ICD-10-CM

## 2017-04-18 NOTE — TELEPHONE ENCOUNTER
citalopram     Last Written Prescription Date: 05/20/2016  Last Fill Quantity: 90, # refills: 3  Last Office Visit with [facility] primary care provider:  01/03/2017        Last PHQ-9 score on record=   PHQ-9 SCORE 10/31/2016   Total Score -   Total Score 12

## 2017-04-20 RX ORDER — CITALOPRAM HYDROBROMIDE 20 MG/1
TABLET ORAL
Qty: 90 TABLET | Refills: 1 | Status: SHIPPED | OUTPATIENT
Start: 2017-04-20 | End: 2017-05-18

## 2017-05-18 ENCOUNTER — OFFICE VISIT (OUTPATIENT)
Dept: FAMILY MEDICINE | Facility: CLINIC | Age: 82
End: 2017-05-18
Payer: COMMERCIAL

## 2017-05-18 VITALS
HEIGHT: 59 IN | SYSTOLIC BLOOD PRESSURE: 136 MMHG | DIASTOLIC BLOOD PRESSURE: 72 MMHG | WEIGHT: 158.2 LBS | HEART RATE: 73 BPM | BODY MASS INDEX: 31.89 KG/M2 | OXYGEN SATURATION: 91 % | TEMPERATURE: 98.4 F

## 2017-05-18 DIAGNOSIS — H61.23 BILATERAL IMPACTED CERUMEN: ICD-10-CM

## 2017-05-18 DIAGNOSIS — E78.5 HYPERLIPIDEMIA WITH TARGET LDL LESS THAN 130: ICD-10-CM

## 2017-05-18 DIAGNOSIS — I10 BENIGN ESSENTIAL HYPERTENSION: ICD-10-CM

## 2017-05-18 DIAGNOSIS — F32.0 MAJOR DEPRESSIVE DISORDER, SINGLE EPISODE, MILD (H): ICD-10-CM

## 2017-05-18 DIAGNOSIS — Z00.00 MEDICARE ANNUAL WELLNESS VISIT, SUBSEQUENT: Primary | ICD-10-CM

## 2017-05-18 PROCEDURE — 99397 PER PM REEVAL EST PAT 65+ YR: CPT | Mod: 25 | Performed by: FAMILY MEDICINE

## 2017-05-18 PROCEDURE — 69209 REMOVE IMPACTED EAR WAX UNI: CPT | Mod: 50 | Performed by: FAMILY MEDICINE

## 2017-05-18 RX ORDER — DULOXETIN HYDROCHLORIDE 60 MG/1
60 CAPSULE, DELAYED RELEASE ORAL DAILY
COMMUNITY

## 2017-05-18 RX ORDER — ZOLPIDEM TARTRATE 5 MG/1
2.5 TABLET ORAL AT BEDTIME
Qty: 30 TABLET | Refills: 0 | COMMUNITY
Start: 2017-05-18 | End: 2017-10-31

## 2017-05-18 RX ORDER — MIRTAZAPINE 15 MG/1
15 TABLET, FILM COATED ORAL AT BEDTIME
COMMUNITY
End: 2019-01-01

## 2017-05-18 RX ORDER — ATENOLOL 25 MG/1
25 TABLET ORAL
Qty: 180 TABLET | Refills: 3 | Status: SHIPPED | OUTPATIENT
Start: 2017-05-18 | End: 2017-09-13

## 2017-05-18 RX ORDER — BUSPIRONE HYDROCHLORIDE 10 MG/1
5 TABLET ORAL 2 TIMES DAILY
COMMUNITY
Start: 2019-01-01 | End: 2019-01-01

## 2017-05-18 NOTE — LETTER
My Depression Action Plan  Name: Lara Wills   Date of Birth 6/12/1922  Date: 5/18/2017    My doctor: Alejandra Watson   My clinic: [place] [address]  [facility] [id number] [phone]          GREEN    ZONE   Good Control    What it looks like:     Things are going generally well. You have normal up s and down s. You may even feel depressed from time to time, but bad moods usually last less than a day.   What you need to do:  1. Continue to care for yourself (see self care plan)  2. Check your depression survival kit and update it as needed  3. Follow your physician s recommendations including any medication.  4. Do not stop taking medication unless you consult with your physician first.           YELLOW         ZONE Getting Worse    What it looks like:     Depression is starting to interfere with your life.     It may be hard to get out of bed; you may be starting to isolate yourself from others.    Symptoms of depression are starting to last most all day and this has happened for several days.     You may have suicidal thoughts but they are not constant.   What you need to do:     1. Call your care team, your response to treatment will improve if you keep your care team informed of your progress. Yellow periods are signs an adjustment may need to be made.     2. Continue your self-care, even if you have to fake it!    3. Talk to someone in your support network    4. Open up your depression survival kit           RED    ZONE Medical Alert - Get Help    What it looks like:     Depression is seriously interfering with your life.     You may experience these or other symptoms: You can t get out of bed most days, can t work or engage in other necessary activities, you have trouble taking care of basic hygiene, or basic responsibilities, thoughts of suicide or death that will not go away, self-injurious behavior.     What you need to do:  1. Call your care team and  request a same-day appointment. If they are not available (weekends or after hours) call your local crisis line, emergency room or 911.      Electronically signed by: ANAY Cobos LPN, May 18, 2017    Depression Self Care Plan / Survival Kit    Self-Care for Depression  Here s the deal. Your body and mind are really not as separate as most people think.  What you do and think affects how you feel and how you feel influences what you do and think. This means if you do things that people who feel good do, it will help you feel better.  Sometimes this is all it takes.  There is also a place for medication and therapy depending on how severe your depression is, so be sure to consult with your medical provider and/ or Behavioral Health Consultant if your symptoms are worsening or not improving.     In order to better manage my stress, I will:    Exercise  Get some form of exercise, every day. This will help reduce pain and release endorphins, the  feel good  chemicals in your brain. This is almost as good as taking antidepressants!  This is not the same as joining a gym and then never going! (they count on that by the way ) It can be as simple as just going for a walk or doing some gardening, anything that will get you moving.      Hygiene   Maintain good hygiene (Get out of bed in the morning, Make your bed, Brush your teeth, Take a shower, and Get dressed like you were going to work, even if you are unemployed).  If your clothes don't fit try to get ones that do.    Diet  I will strive to eat foods that are good for me, drink plenty of water, and avoid excessive sugar, caffeine, alcohol, and other mood-altering substances.  Some foods that are helpful in depression are: complex carbohydrates, B vitamins, flaxseed, fish or fish oil, fresh fruits and vegetables.    Psychotherapy  I agree to participate in Individual Therapy (if recommended).    Medication  If prescribed medications, I agree to take them.  Missing doses can  result in serious side effects.  I understand that drinking alcohol, or other illicit drug use, may cause potential side effects.  I will not stop my medication abruptly without first discussing it with my provider.    Staying Connected With Others  I will stay in touch with my friends, family members, and my primary care provider/team.    Use your imagination  Be creative.  We all have a creative side; it doesn t matter if it s oil painting, sand castles, or mud pies! This will also kick up the endorphins.    Witness Beauty  (AKA stop and smell the roses) Take a look outside, even in mid-winter. Notice colors, textures. Watch the squirrels and birds.     Service to others  Be of service to others.  There is always someone else in need.  By helping others we can  get out of ourselves  and remember the really important things.  This also provides opportunities for practicing all the other parts of the program.    Humor  Laugh and be silly!  Adjust your TV habits for less news and crime-drama and more comedy.    Control your stress  Try breathing deep, massage therapy, biofeedback, and meditation. Find time to relax each day.     My support system    Clinic Contact:  Phone number:    Contact 1:  Phone number:    Contact 2:  Phone number:    Judaism/:  Phone number:    Therapist:  Phone number:    Local crisis center:    Phone number:    Other community support:  Phone number:

## 2017-05-18 NOTE — PROGRESS NOTES
"SUBJECTIVE:                                                            Lara Wills is a 89 year old female who presents for Preventive Visit.      Are you in the first 12 months of your Medicare coverage?  No    Physical   Annual:     Getting at least 3 servings of Calcium per day::  Yes    Bi-annual eye exam::  Yes    Dental care twice a year::  Yes    Sleep apnea or symptoms of sleep apnea::  Daytime drowsiness    Diet::  Regular (no restrictions)    Frequency of exercise::  6-7 days/week    Duration of exercise::  Less than 15 minutes    Taking medications regularly::  Yes    Medication side effects::  None    Additional concerns today::  YES      Pt is also having sinus pressure, x2 weeks      COGNITIVE SCREEN  1) Repeat 3 items (Banana, Sunrise, Chair)    2) Clock draw: ABNORMAL pt neli the time wrong, but said the time-\"10 past 11\"   3) 3 item recall: Recalls 2 objects   Results: ABNORMAL clock, 1-2 items recalled: PROBABLE COGNITIVE IMPAIRMENT, **INFORM PROVIDER**    Mini-CogTM Copyright GILMER Haas. Licensed by the author for use in [facility]; reprinted with permission (shobha@[facility]). All rights reserved.        Reviewed and updated as needed this visit by clinical staff  Allergies         Reviewed and updated as needed this visit by Provider        Social History   Substance Use Topics     Smoking status: Never Smoker     Smokeless tobacco: Never Used     Alcohol use No       The patient does not drink >3 drinks per day nor >7 drinks per week.        Hypertension Follow-up      Outpatient blood pressures are not being checked.    Low Salt Diet: no added salt       Today's PHQ-2 Score:   PHQ-2 ( 1999 Pfizer) 5/18/2017   Q1: Little interest or pleasure in doing things 0   Q2: Feeling down, depressed or hopeless 0   PHQ-2 Score 0   Little interest or pleasure in doing things -   Feeling down, depressed or hopeless -   PHQ-2 Score -       Do you feel safe in your environment - Yes    Do you have a " Health Care Directive?: Yes: Advance Directive has been received and scanned.    Current providers sharing in care for this patient include:   Patient Care Team:  Alejandra Watson MD as PCP - General (Family Practice)      Hearing impairment: Yes, Difficulty following a conversation in a noisy restaurant or crowded room.    Ability to successfully perform activities of daily living: No, needs assistance with: shopping, preparing meals, housework, bathing, laundry and medications     Fall risk:  Fallen 2 or more times in the past year?: No  Any fall with injury in the past year?: No    Home safety:  none identified  click delete button to remove this line now    The following health maintenance items are reviewed in Epic and correct as of today:  Health Maintenance   Topic Date Due     DEPRESSION ACTION PLAN Q1 YR (NO INBASKET)  02/11/2017     FALL RISK ASSESSMENT  05/20/2017     LIPID MONITORING Q1 YEAR( NO INBASKET)  05/20/2017     PHQ-9 Q6 MONTHS (NO INBASKET)  07/30/2017     INFLUENZA VACCINE (SYSTEM ASSIGNED)  09/01/2017     TSH Q1 YEAR (NO INBASKET)  11/24/2017     ADVANCE DIRECTIVE PLANNING Q5 YRS (NO INBASKET)  04/10/2022     TETANUS IMMUNIZATION (SYSTEM ASSIGNED)  12/07/2026     PNEUMOCOCCAL  Completed              ROS:  Constitutional, HEENT, cardiovascular, pulmonary, gi and gu systems are negative, except as otherwise noted.    Ears do feel plugged x 2 weeks, some sinus drainage, hoarsness, etc.  No facial pain.  No fever.  No significant cough.     Problem list, Medication list, Allergies, and Medical/Social/Surgical histories reviewed in [facility] and updated as appropriate.  Labs reviewed in EPIC  BP Readings from Last 3 Encounters:   05/18/17 136/72   01/24/17 158/78   01/17/17 120/67    Wt Readings from Last 3 Encounters:   05/18/17 158 lb 3.2 oz (71.8 kg)   01/24/17 154 lb (69.9 kg)   01/17/17 155 lb (70.3 kg)                  OBJECTIVE:                                                            BP  "136/72  Pulse 73  Temp 98.4  F (36.9  C) (Tympanic)  Ht 4' 11\" (1.499 m)  Wt 158 lb 3.2 oz (71.8 kg)  SpO2 91%  BMI 31.95 kg/m2 Estimated body mass index is 31.95 kg/(m^2) as calculated from the following:    Height as of this encounter: 4' 11\" (1.499 m).    Weight as of this encounter: 158 lb 3.2 oz (71.8 kg).  EXAM:   GENERAL: healthy, alert and no distress  EYES: Eyes grossly normal to inspection, PERRL and conjunctivae and sclerae normal  HENT: normal cephalic/atraumatic, both ears: occluded with wax and flushed by cma and symptoms resolved 100%, nose and mouth without ulcers or lesions, oropharynx clear and oral mucous membranes moist  NECK: no adenopathy, no asymmetry, masses, or scars and thyroid normal to palpation  RESP: lungs clear to auscultation - no rales, rhonchi or wheezes  CV: regular rate and rhythm, normal S1 S2, no S3 or S4, no murmur, click or rub, no peripheral edema and peripheral pulses strong  ABDOMEN: soft, nontender, no hepatosplenomegaly, no masses and bowel sounds normal  MS: no gross musculoskeletal defects noted, no edema  SKIN: no suspicious lesions or rashes  NEURO: Normal strength and tone, mentation intact and speech normal  PSYCH: mentation appears normal, affect normal/bright    ASSESSMENT / PLAN:                                                            1. Medicare annual wellness visit, subsequent       2. Benign essential hypertension  Well controlled  - atenolol (TENORMIN) 25 MG tablet; Take 1 tablet (25 mg) by mouth 2 times daily  Dispense: 180 tablet; Refill: 3    3. Hyperlipidemia with target LDL less than 130       4. Bilateral impacted cerumen       5. Major depressive disorder, single episode, mild (H)  Seeing [facility]- much improve depression and anxiety      End of Life Planning:  Patient currently has an advanced directive: No.  I have verified the patient's ablity to prepare an advanced directive/make health care decisions.  Literature was " "provided to assist patient in preparing an advanced directive.    COUNSELING:  Reviewed preventive health counseling, as reflected in patient instructions       Regular exercise       Healthy diet/nutrition       Vision screening        Estimated body mass index is 31.95 kg/(m^2) as calculated from the following:    Height as of this encounter: 4' 11\" (1.499 m).    Weight as of this encounter: 158 lb 3.2 oz (71.8 kg).  Weight management plan: Discussed healthy diet and exercise guidelines and patient will follow up in 12 months in clinic to re-evaluate.   reports that she has never smoked. She has never used smokeless tobacco.      Appropriate preventive services were discussed with this patient, including applicable screening as appropriate for cardiovascular disease, diabetes, osteopenia/osteoporosis, and glaucoma.  As appropriate for age/gender, discussed screening for colorectal cancer, prostate cancer, breast cancer, and cervical cancer. Checklist reviewing preventive services available has been given to the patient.    Reviewed patients plan of care and provided an AVS. The Basic Care Plan (routine screening as documented in Health Maintenance) for Lara meets the Care Plan requirement. This Care Plan has been established and reviewed with the Patient and daughter.    Counseling Resources:  ATP IV Guidelines  Pooled Cohorts Equation Calculator  Breast Cancer Risk Calculator  FRAX Risk Assessment  ICSI Preventive Guidelines  Dietary Guidelines for Americans, 2010  Lignol's MyPlate  ASA Prophylaxis  Lung CA Screening    Alejandra Watson MD  [facility] for HPI/ROS submitted by the patient on 5/15/2017   Q1: Little interest or pleasure in doing things: 0=Not at all  Q2: Feeling down, depressed or hopeless: 0=Not at all  PHQ-2 Score: 0    "

## 2017-05-18 NOTE — MR AVS SNAPSHOT
After Visit Summary   5/18/2017    Lara Wills    MRN: [id number]           Patient Information     Date Of Birth          6/12/1922        Visit Information        Provider Department      5/18/2017 2:00 PM Alejandra Watson MD [facility]        Today's Diagnoses     Bilateral impacted cerumen    -  1    Benign essential hypertension        Hyperlipidemia with target LDL less than 130        Major depressive disorder, single episode, mild (H)          Care Instructions      Preventive Health Recommendations    Female Ages 65 +    Yearly exam:     See your health care provider every year in order to  o Review health changes.   o Discuss preventive care.    o Review your medicines if your doctor has prescribed any.      You no longer need a yearly Pap test unless you've had an abnormal Pap test in the past 10 years. If you have vaginal symptoms, such as bleeding or discharge, be sure to talk with your provider about a Pap test.      Every 1 to 2 years, have a mammogram.  If you are over 69, talk with your health care provider about whether or not you want to continue having screening mammograms.      Every 10 years, have a colonoscopy. Or, have a yearly FIT test (stool test). These exams will check for colon cancer.       Have a cholesterol test every 5 years, or more often if your doctor advises it.       Have a diabetes test (fasting glucose) every three years. If you are at risk for diabetes, you should have this test more often.       At age 65, have a bone density scan (DEXA) to check for osteoporosis (brittle bone disease).    Shots:    Get a flu shot each year.    Get a tetanus shot every 10 years.    Talk to your doctor about your pneumonia vaccines. There are now two you should receive - Pneumovax (PPSV 23) and Prevnar (PCV 13).    Talk to your doctor about the shingles vaccine.    Talk to your doctor about the hepatitis B vaccine.    Nutrition:     Eat at least 5 servings  of fruits and vegetables each day.      Eat whole-grain bread, whole-wheat pasta and brown rice instead of white grains and rice.      Talk to your provider about Calcium and Vitamin D.     Lifestyle    Exercise at least 150 minutes a week (30 minutes a day, 5 days a week). This will help you control your weight and prevent disease.      Limit alcohol to one drink per day.      No smoking.       Wear sunscreen to prevent skin cancer.       See your dentist twice a year for an exam and cleaning.      See your eye doctor every 1 to 2 years to screen for conditions such as glaucoma, macular degeneration and cataracts.        Follow-ups after your visit        Who to contact     If you have questions or need follow up information about today's clinic visit or your schedule please contact [facility] directly at [phone].  Normal or non-critical lab and imaging results will be communicated to you by [email or web address], letter or phone within 4 business days after the clinic has received the results. If you do not hear from us within 7 days, please contact the clinic through [email or web address] or phone. If you have a critical or abnormal lab result, we will notify you by phone as soon as possible.  Submit refill requests through [employer] or call your pharmacy and they will forward the refill request to us. Please allow 3 business days for your refill to be completed.          Additional Information About Your Visit        [employer] Information     [employer] gives you secure access to your electronic health record. If you see a primary care provider, you can also send messages to your care team and make appointments. If you have questions, please call your primary care clinic.  If you do not have a primary care provider, please call [phone] and they will assist you.        Care EveryWhere ID     This is your Care EveryWhere ID. This could be used by other organizations to access your [facility]  "records  [id number]        Your Vitals Were     Pulse Temperature Height Pulse Oximetry BMI (Body Mass Index)       73 98.4  F (36.9  C) (Tympanic) 4' 11\" (1.499 m) 91% 31.95 kg/m2        Blood Pressure from Last 3 Encounters:   05/18/17 136/72   01/24/17 158/78   01/17/17 120/67    Weight from Last 3 Encounters:   05/18/17 158 lb 3.2 oz (71.8 kg)   01/24/17 154 lb (69.9 kg)   01/17/17 155 lb (70.3 kg)              We Performed the Following     DEPRESSION ACTION PLAN (DAP)          Today's Medication Changes          These changes are accurate as of: 5/18/17  2:36 PM.  If you have any questions, ask your nurse or doctor.               These medicines have changed or have updated prescriptions.        Dose/Directions    AMBIEN 5 MG tablet   This may have changed:  how much to take   Generic drug:  zolpidem        Dose:  2.5 mg   Take 0.5 tablets (2.5 mg) by mouth At Bedtime   Quantity:  30 tablet   Refills:  0       busPIRone 10 MG tablet   Commonly known as:  BUSPAR   This may have changed:  Another medication with the same name was removed. Continue taking this medication, and follow the directions you see here.        Dose:  10 mg   Take 10 mg by mouth 2 times daily   Refills:  0       VITAMIN D (CHOLECALCIFEROL) PO   This may have changed:  Another medication with the same name was removed. Continue taking this medication, and follow the directions you see here.        Dose:  2000 Units   Take 2,000 Units by mouth daily   Refills:  0         Stop taking these medicines if you haven't already. Please contact your care team if you have questions.     Lovastatin 40 MG Tb24           polyethylene glycol Packet   Commonly known as:  MIRALAX/GLYCOLAX                Where to get your medicines      These medications were sent to [place] [address] [place] [address] [address]     Phone:  [phone]     atenolol 25 MG tablet                Primary Care Provider " Office Phone # Fax #    Alejandra Watson [phone]       [facility] [address]  [facility] [address]        Thank you!     Thank you for choosing [facility]  for your care. Our goal is always to provide you with excellent care. Hearing back from our patients is one way we can continue to improve our services. Please take a few minutes to complete the written survey that you may receive in the mail after your visit with us. Thank you!             Your Updated Medication List - Protect others around you: Learn how to safely use, store and throw away your medicines at www.disposemymeds.org.          This list is accurate as of: 5/18/17  2:36 PM.  Always use your most recent med list.                   Brand Name Dispense Instructions for use    AMBIEN 5 MG tablet   Generic drug:  zolpidem     30 tablet    Take 0.5 tablets (2.5 mg) by mouth At Bedtime       amLODIPine 5 MG tablet    NORVASC    180 tablet    Take 1 tablet (5 mg) by mouth 2 times daily       atenolol 25 MG tablet    TENORMIN    180 tablet    Take 1 tablet (25 mg) by mouth 2 times daily       busPIRone 10 MG tablet    BUSPAR     Take 10 mg by mouth 2 times daily       calcium-vitamin D 600-400 MG-UNIT per tablet    CALTRATE     Take 1 tablet by mouth 2 times daily       docusate sodium 100 MG capsule    COLACE     Take 100 mg by mouth 3 times daily       DULoxetine 60 MG EC capsule    CYMBALTA     Take 60 mg by mouth daily       EXCEDRIN EXTRA STRENGTH 250-250-65 MG per tablet   Generic drug:  aspirin-acetaminophen-caffeine      Take 1 tablet by mouth every 6 hours as needed       latanoprost 0.005 % ophthalmic solution    XALATAN    1 Bottle    Place 1 drop into both eyes At Bedtime       levothyroxine 25 MCG tablet    SYNTHROID/LEVOTHROID    90 tablet    Take 1 tablet (25 mcg) by mouth daily       losartan 25 MG tablet    COZAAR    180 tablet    Take 1 tablet (25 mg) by mouth 2 times daily        multivitamin  with lutein Caps per capsule      Take 1 capsule by mouth daily       order for DME      Oxygen 2 Li/min at night       REMERON 15 MG tablet   Generic drug:  mirtazapine      Take 15 mg by mouth At Bedtime       VITAMIN D (CHOLECALCIFEROL) PO      Take 2,000 Units by mouth daily

## 2017-05-18 NOTE — NURSING NOTE
"Chief Complaint   Patient presents with     Wellness Visit       Initial /72  Pulse 73  Temp 98.4  F (36.9  C) (Tympanic)  Ht 4' 11\" (1.499 m)  Wt 158 lb 3.2 oz (71.8 kg)  SpO2 91%  BMI 31.95 kg/m2 Estimated body mass index is 31.95 kg/(m^2) as calculated from the following:    Height as of this encounter: 4' 11\" (1.499 m).    Weight as of this encounter: 158 lb 3.2 oz (71.8 kg).  Medication Reconciliation: complete    "

## 2017-05-30 ENCOUNTER — TRANSFERRED RECORDS (OUTPATIENT)
Dept: HEALTH INFORMATION MANAGEMENT | Facility: CLINIC | Age: 82
End: 2017-05-30

## 2017-08-31 ENCOUNTER — TRANSFERRED RECORDS (OUTPATIENT)
Dept: HEALTH INFORMATION MANAGEMENT | Facility: CLINIC | Age: 82
End: 2017-08-31

## 2017-09-12 ENCOUNTER — TELEPHONE (OUTPATIENT)
Dept: FAMILY MEDICINE | Facility: CLINIC | Age: 82
End: 2017-09-12

## 2017-09-12 DIAGNOSIS — I10 BENIGN ESSENTIAL HYPERTENSION: Primary | ICD-10-CM

## 2017-09-12 NOTE — TELEPHONE ENCOUNTER
Reason for Call:  Other prescription    Detailed comments: Rec'd fax from [facility] - Atenolol 25mg not available.  Please send alternative dose or different Rx to pharmacy     Phone Number Patient can be reached at: Other phone number:  [phone]    Best Time: any    Can we leave a detailed message on this number? YES    Call taken on 9/12/2017 at 7:39 AM by Rossana Gutierrez

## 2017-09-13 RX ORDER — METOPROLOL SUCCINATE 25 MG/1
25 TABLET, EXTENDED RELEASE ORAL DAILY
Qty: 90 TABLET | Refills: 3 | Status: SHIPPED | OUTPATIENT
Start: 2017-09-13 | End: 2017-09-13

## 2017-09-13 RX ORDER — METOPROLOL SUCCINATE 25 MG/1
25 TABLET, EXTENDED RELEASE ORAL 2 TIMES DAILY
Qty: 180 TABLET | Refills: 3 | Status: SHIPPED | OUTPATIENT
Start: 2017-09-13 | End: 2018-01-01

## 2017-09-13 NOTE — TELEPHONE ENCOUNTER
Change to metoprolol xl 25 mg twice daily.   Recheck with RN or have someone check her blood pressure in a week or so after the change.     Alejandra Watson M.D.

## 2017-10-31 ENCOUNTER — ALLIED HEALTH/NURSE VISIT (OUTPATIENT)
Dept: FAMILY MEDICINE | Facility: CLINIC | Age: 82
End: 2017-10-31
Payer: COMMERCIAL

## 2017-10-31 VITALS — HEART RATE: 64 BPM | SYSTOLIC BLOOD PRESSURE: 142 MMHG | DIASTOLIC BLOOD PRESSURE: 68 MMHG

## 2017-10-31 DIAGNOSIS — I10 BENIGN ESSENTIAL HYPERTENSION: Primary | ICD-10-CM

## 2017-10-31 DIAGNOSIS — F41.1 GENERALIZED ANXIETY DISORDER: ICD-10-CM

## 2017-10-31 DIAGNOSIS — E03.9 HYPOTHYROIDISM, UNSPECIFIED TYPE: ICD-10-CM

## 2017-10-31 DIAGNOSIS — I10 BENIGN ESSENTIAL HYPERTENSION: ICD-10-CM

## 2017-10-31 LAB
ALBUMIN SERPL-MCNC: 3.6 G/DL (ref 3.4–5)
ALP SERPL-CCNC: 87 U/L (ref 40–150)
ALT SERPL W P-5'-P-CCNC: 18 U/L (ref 0–50)
ANION GAP SERPL CALCULATED.3IONS-SCNC: 6 MMOL/L (ref 3–14)
AST SERPL W P-5'-P-CCNC: 19 U/L (ref 0–45)
BASOPHILS # BLD AUTO: 0.1 10E9/L (ref 0–0.2)
BASOPHILS NFR BLD AUTO: 1.3 %
BILIRUB SERPL-MCNC: 0.4 MG/DL (ref 0.2–1.3)
BUN SERPL-MCNC: 16 MG/DL (ref 7–30)
CALCIUM SERPL-MCNC: 9 MG/DL (ref 8.5–10.1)
CHLORIDE SERPL-SCNC: 107 MMOL/L (ref 94–109)
CO2 SERPL-SCNC: 31 MMOL/L (ref 20–32)
CREAT SERPL-MCNC: 0.99 MG/DL (ref 0.52–1.04)
DIFFERENTIAL METHOD BLD: NORMAL
EOSINOPHIL # BLD AUTO: 0.1 10E9/L (ref 0–0.7)
EOSINOPHIL NFR BLD AUTO: 1.2 %
ERYTHROCYTE [DISTWIDTH] IN BLOOD BY AUTOMATED COUNT: 13.7 % (ref 10–15)
GFR SERPL CREATININE-BSD FRML MDRD: 52 ML/MIN/1.7M2
GLUCOSE SERPL-MCNC: 82 MG/DL (ref 70–99)
HCT VFR BLD AUTO: 38.9 % (ref 35–47)
HGB BLD-MCNC: 12.7 G/DL (ref 11.7–15.7)
LYMPHOCYTES # BLD AUTO: 1.7 10E9/L (ref 0.8–5.3)
LYMPHOCYTES NFR BLD AUTO: 21.9 %
MCH RBC QN AUTO: 29.8 PG (ref 26.5–33)
MCHC RBC AUTO-ENTMCNC: 32.6 G/DL (ref 31.5–36.5)
MCV RBC AUTO: 91 FL (ref 78–100)
MONOCYTES # BLD AUTO: 0.7 10E9/L (ref 0–1.3)
MONOCYTES NFR BLD AUTO: 8.8 %
NEUTROPHILS # BLD AUTO: 5.2 10E9/L (ref 1.6–8.3)
NEUTROPHILS NFR BLD AUTO: 66.8 %
PLATELET # BLD AUTO: 323 10E9/L (ref 150–450)
POTASSIUM SERPL-SCNC: 3.6 MMOL/L (ref 3.4–5.3)
PROT SERPL-MCNC: 7.3 G/DL (ref 6.8–8.8)
RBC # BLD AUTO: 4.26 10E12/L (ref 3.8–5.2)
SODIUM SERPL-SCNC: 144 MMOL/L (ref 133–144)
T4 FREE SERPL-MCNC: 0.82 NG/DL (ref 0.76–1.46)
TSH SERPL DL<=0.005 MIU/L-ACNC: 4.79 MU/L (ref 0.4–4)
WBC # BLD AUTO: 7.7 10E9/L (ref 4–11)

## 2017-10-31 PROCEDURE — 99207 ZZC NO CHARGE NURSE ONLY: CPT

## 2017-10-31 PROCEDURE — 84439 ASSAY OF FREE THYROXINE: CPT | Performed by: FAMILY MEDICINE

## 2017-10-31 PROCEDURE — 36415 COLL VENOUS BLD VENIPUNCTURE: CPT | Performed by: FAMILY MEDICINE

## 2017-10-31 PROCEDURE — 80050 GENERAL HEALTH PANEL: CPT | Performed by: FAMILY MEDICINE

## 2017-10-31 RX ORDER — ZOLPIDEM TARTRATE 5 MG/1
2.5 TABLET ORAL
Qty: 30 TABLET | Refills: 0 | COMMUNITY
Start: 2017-10-31 | End: 2018-01-01

## 2017-10-31 NOTE — MR AVS SNAPSHOT
After Visit Summary   10/31/2017    Lara Wills    MRN: [id number]           Patient Information     Date Of Birth          6/12/1922        Visit Information        Provider Department      10/31/2017 11:00 AM [place] CL RN [facility]        Today's Diagnoses     Benign essential hypertension    -  1    Hypothyroidism, unspecified type        Generalized anxiety disorder           Follow-ups after your visit        Your next 10 appointments already scheduled     Nov 28, 2017  3:30 PM CST   Return Sleep Patient with Noble Ojeda MD   [facility] ([facility]    [address]  [facility] [address]   [phone]              Who to contact     If you have questions or need follow up information about today's clinic visit or your schedule please contact [facility] directly at [phone].  Normal or non-critical lab and imaging results will be communicated to you by MyChart, letter or phone within 4 business days after the clinic has received the results. If you do not hear from us within 7 days, please contact the clinic through MyChart or phone. If you have a critical or abnormal lab result, we will notify you by phone as soon as possible.  Submit refill requests through [employer] or call your pharmacy and they will forward the refill request to us. Please allow 3 business days for your refill to be completed.          Additional Information About Your Visit        MyChart Information     [employer] gives you secure access to your electronic health record. If you see a primary care provider, you can also send messages to your care team and make appointments. If you have questions, please call your primary care clinic.  If you do not have a primary care provider, please call [phone] and they will assist you.        Care EveryWhere ID     This is your Care EveryWhere ID. This could be used by other  organizations to access your [facility] medical records  [id number]        Your Vitals Were     Pulse                   64            Blood Pressure from Last 3 Encounters:   10/31/17 142/68   05/18/17 136/72   01/24/17 158/78    Weight from Last 3 Encounters:   05/18/17 158 lb 3.2 oz (71.8 kg)   01/24/17 154 lb (69.9 kg)   01/17/17 155 lb (70.3 kg)                 Today's Medication Changes          These changes are accurate as of: 10/31/17 11:22 AM.  If you have any questions, ask your nurse or doctor.               These medicines have changed or have updated prescriptions.        Dose/Directions    AMBIEN 5 MG tablet   This may have changed:    - when to take this  - reasons to take this   Used for:  Generalized anxiety disorder   Generic drug:  zolpidem        Dose:  2.5 mg   Take 0.5 tablets (2.5 mg) by mouth nightly as needed for sleep   Quantity:  30 tablet   Refills:  0                Primary Care Provider Office Phone # Fax #    Alejandra Watson [phone] [address] [facility] [address]        Equal Access to Services     [facility] AH: Hadii ajith yoder Soolman, waaxda luantonietaadaha, qaybta kaaladriana samson, patric kang . So [facility] [phone].    ATENCIÓN: Si habla español, tiene a ro disposición servicios gratuitos de asistencia lingüística. [facility] [phone].    We comply with applicable federal civil rights laws and [place] laws. We do not discriminate on the basis of race, color, national origin, age, disability, sex, sexual orientation, or gender identity.            Thank you!     Thank you for choosing [facility]  for your care. Our goal is always to provide you with excellent care. Hearing back from our patients is one way we can continue to improve our services. Please take a few minutes to complete the written survey that you may receive in the mail after your visit with us. Thank you!             Your  Updated Medication List - Protect others around you: Learn how to safely use, store and throw away your medicines at www.disposemymeds.org.          This list is accurate as of: 10/31/17 11:22 AM.  Always use your most recent med list.                   Brand Name Dispense Instructions for use Diagnosis    AMBIEN 5 MG tablet   Generic drug:  zolpidem     30 tablet    Take 0.5 tablets (2.5 mg) by mouth nightly as needed for sleep    Generalized anxiety disorder       amLODIPine 5 MG tablet    NORVASC    180 tablet    Take 1 tablet (5 mg) by mouth 2 times daily    Benign essential hypertension       busPIRone 10 MG tablet    BUSPAR     Take 10 mg by mouth 2 times daily        calcium-vitamin D 600-400 MG-UNIT per tablet    CALTRATE     Take 1 tablet by mouth 2 times daily        docusate sodium 100 MG capsule    COLACE     Take 100 mg by mouth 3 times daily        DULoxetine 60 MG EC capsule    CYMBALTA     Take 60 mg by mouth daily        EXCEDRIN EXTRA STRENGTH 250-250-65 MG per tablet   Generic drug:  aspirin-acetaminophen-caffeine      Take 1 tablet by mouth every 6 hours as needed        latanoprost 0.005 % ophthalmic solution    XALATAN    1 Bottle    Place 1 drop into both eyes At Bedtime        levothyroxine 25 MCG tablet    SYNTHROID/LEVOTHROID    90 tablet    Take 1 tablet (25 mcg) by mouth daily    Hypothyroidism, unspecified type       losartan 25 MG tablet    COZAAR    180 tablet    Take 1 tablet (25 mg) by mouth 2 times daily    Benign essential hypertension       metoprolol 25 MG 24 hr tablet    TOPROL-XL    180 tablet    Take 1 tablet (25 mg) by mouth 2 times daily    Benign essential hypertension       multivitamin  with lutein Caps per capsule      Take 1 capsule by mouth daily        order for DME      Oxygen 2 Li/min at night    Other secondary hypertension       REMERON 15 MG tablet   Generic drug:  mirtazapine      Take 15 mg by mouth At Bedtime        VITAMIN D (CHOLECALCIFEROL) PO      Take  2,000 Units by mouth daily

## 2017-10-31 NOTE — NURSING NOTE
B/P check.  Vitals:    10/31/17 1104 10/31/17 1105   BP: 150/68 142/68   BP Location: Right arm Right arm   Patient Position: Sitting Sitting   Cuff Size: Adult Regular Adult Regular   Pulse: 64        Pt due for appt in early Dec.   Pt requesting to have labs done today, lab orders placed and drawn.  [facility]  No refills needed @ this time.  Change in Ambien order to PRN.   KpavelRN

## 2017-11-17 ENCOUNTER — OFFICE VISIT (OUTPATIENT)
Dept: FAMILY MEDICINE | Facility: CLINIC | Age: 82
End: 2017-11-17
Payer: COMMERCIAL

## 2017-11-17 VITALS
WEIGHT: 163 LBS | DIASTOLIC BLOOD PRESSURE: 65 MMHG | RESPIRATION RATE: 18 BRPM | BODY MASS INDEX: 32.86 KG/M2 | HEIGHT: 59 IN | HEART RATE: 63 BPM | SYSTOLIC BLOOD PRESSURE: 136 MMHG

## 2017-11-17 DIAGNOSIS — M54.42 ACUTE LEFT-SIDED LOW BACK PAIN WITH LEFT-SIDED SCIATICA: Primary | ICD-10-CM

## 2017-11-17 PROCEDURE — 99213 OFFICE O/P EST LOW 20 MIN: CPT | Performed by: FAMILY MEDICINE

## 2017-11-17 RX ORDER — BRIMONIDINE TARTRATE 2 MG/ML
1 SOLUTION/ DROPS OPHTHALMIC 2 TIMES DAILY
COMMUNITY

## 2017-11-17 ASSESSMENT — PAIN SCALES - GENERAL: PAINLEVEL: MODERATE PAIN (4)

## 2017-11-17 NOTE — NURSING NOTE
"Chief Complaint   Patient presents with     Back Pain     X 2 weeks patient has had this pain in low left side of back that travels down hip into left leg. Patient has had no falls or injury. Patient takes excedrin for pain. Patient currently rates pain 4/10.        Initial /65  Pulse 63  Resp 18  Ht 4' 11\" (1.499 m)  Wt 163 lb (73.9 kg)  Breastfeeding? No  BMI 32.92 kg/m2 Estimated body mass index is 32.92 kg/(m^2) as calculated from the following:    Height as of this encounter: 4' 11\" (1.499 m).    Weight as of this encounter: 163 lb (73.9 kg).  Medication Reconciliation: complete    "

## 2017-11-17 NOTE — MR AVS SNAPSHOT
After Visit Summary   11/17/2017    Lara Wills    MRN: [id number]           Patient Information     Date Of Birth          6/12/1922        Visit Information        Provider Department      11/17/2017 1:20 PM Alejandra Watson MD [facility]        Today's Diagnoses     Acute left-sided low back pain with left-sided sciatica    -  1      Care Instructions      Naproxen 325 mg twice a day with food for a week and then as needed.      Try to minimize use of the excedrin    Start physical therapy      Alejandra Watson M.D.      Thank you for choosing [facility].  You may be receiving a survey in the mail from [employer] regarding your visit today.  Please take a few minutes to complete and return the survey to let us know how we are doing.      Our Clinic hours are:  Mondays    7:20 am - 7 pm  Tues -  Fri  7:20 am - 5 pm    Clinic Phone: [phone]    The clinic lab opens at 7:30 am Mon - Fri and appointments are required.    [facility]  Ph. [phone]  Monday-Thursday 8 am - 7pm  Tues/Wed/Fri 8 am - 5:30 pm                 Follow-ups after your visit        Additional Services     PHYSICAL THERAPY REFERRAL       *This therapy referral will be filtered to a centralized scheduling office at [facility] and the patient will receive a call to schedule an appointment at a [facility] location most convenient for them. *     [facility] provides Physical Therapy evaluation and treatment and many specialty services across the [facility] system.  If requesting a specialty program, please choose from the list below.    If you have not heard from the scheduling office within 2 business days, please call [phone] for all locations, with the exception of [place], please call [phone].  Treatment: Evaluation & Treatment  Special Instructions/Modalities:    Special Programs: None    Please be aware that coverage of these  "services is subject to the terms and limitations of your health insurance plan.  Call member services at your health plan with any benefit or coverage questions.      **Note to Provider:  If you are referring outside of [facility] for the therapy appointment, please list the name of the location in the \"special instructions\" above, print the referral and give to the patient to schedule the appointment.                  Your next 10 appointments already scheduled     Nov 28, 2017  3:30 PM CST   Return Sleep Patient with Noble Ojeda MD   [facility] ([facility])    [address]  [facility] [address]   [phone]              Who to contact     If you have questions or need follow up information about today's clinic visit or your schedule please contact [facility] directly at [phone].  Normal or non-critical lab and imaging results will be communicated to you by MyChart, letter or phone within 4 business days after the clinic has received the results. If you do not hear from us within 7 days, please contact the clinic through Presid[employer] or phone. If you have a critical or abnormal lab result, we will notify you by phone as soon as possible.  Submit refill requests through [employer] or call your pharmacy and they will forward the refill request to us. Please allow 3 business days for your refill to be completed.          Additional Information About Your Visit        MyChart Information     [employer] gives you secure access to your electronic health record. If you see a primary care provider, you can also send messages to your care team and make appointments. If you have questions, please call your primary care clinic.  If you do not have a primary care provider, please call [phone] and they will assist you.        Care EveryWhere ID     This is your Care EveryWhere ID. This could be used by other organizations to access your [facility] " "medical records  [id number]        Your Vitals Were     Pulse Respirations Height Breastfeeding? BMI (Body Mass Index)       63 18 4' 11\" (1.499 m) No 32.92 kg/m2        Blood Pressure from Last 3 Encounters:   11/17/17 136/65   10/31/17 142/68   05/18/17 136/72    Weight from Last 3 Encounters:   11/17/17 163 lb (73.9 kg)   05/18/17 158 lb 3.2 oz (71.8 kg)   01/24/17 154 lb (69.9 kg)              We Performed the Following     PHYSICAL THERAPY REFERRAL          Today's Medication Changes          These changes are accurate as of: 11/17/17  1:50 PM.  If you have any questions, ask your nurse or doctor.               Start taking these medicines.        Dose/Directions    naproxen 375 MG tablet   Commonly known as:  NAPROSYN   Used for:  Acute left-sided low back pain with left-sided sciatica   Started by:  Alejandra Watson MD        Dose:  375 mg   Take 1 tablet (375 mg) by mouth 2 times daily (with meals)   Quantity:  60 tablet   Refills:  0            Where to get your medicines      These medications were sent to [place] [address] [place] [address] [address]     Phone:  [phone]     naproxen 375 MG tablet                Primary Care Provider Office Phone # Fax #    Alejandra Watson [phone] [address] [facility] [address]        Equal Access to Services     [facility] AH: Hadii aad ku hadasho Soomaali, waaxda luqadaha, qaybta kaalmada adeegyada, wax cassandrain hayreeman david arnold la'bill ah. So [facility] [phone].    ATENCIÓN: Si habla español, tiene a ro disposición servicios gratuitos de asistencia lingüística. Llame al [phone].    We comply with applicable federal civil rights laws and [place] laws. We do not discriminate on the basis of race, color, national origin, age, disability, sex, sexual orientation, or gender identity.            Thank you!     Thank you for choosing [facility]  for your " care. Our goal is always to provide you with excellent care. Hearing back from our patients is one way we can continue to improve our services. Please take a few minutes to complete the written survey that you may receive in the mail after your visit with us. Thank you!             Your Updated Medication List - Protect others around you: Learn how to safely use, store and throw away your medicines at www.disposemymeds.org.          This list is accurate as of: 11/17/17  1:50 PM.  Always use your most recent med list.                   Brand Name Dispense Instructions for use Diagnosis    AMBIEN 5 MG tablet   Generic drug:  zolpidem     30 tablet    Take 0.5 tablets (2.5 mg) by mouth nightly as needed for sleep    Generalized anxiety disorder       amLODIPine 5 MG tablet    NORVASC    180 tablet    Take 1 tablet (5 mg) by mouth 2 times daily    Benign essential hypertension       brimonidine 0.2 % ophthalmic solution    ALPHAGAN     Place 1 drop Into the left eye 2 times daily        busPIRone 10 MG tablet    BUSPAR     Take 10 mg by mouth 2 times daily        calcium-vitamin D 600-400 MG-UNIT per tablet    CALTRATE     Take 1 tablet by mouth 2 times daily        docusate sodium 100 MG capsule    COLACE     Take 100 mg by mouth 3 times daily        DULoxetine 60 MG EC capsule    CYMBALTA     Take 60 mg by mouth daily        EXCEDRIN EXTRA STRENGTH 250-250-65 MG per tablet   Generic drug:  aspirin-acetaminophen-caffeine      Take 1 tablet by mouth every 6 hours as needed        latanoprost 0.005 % ophthalmic solution    XALATAN    1 Bottle    Place 1 drop into both eyes At Bedtime        levothyroxine 25 MCG tablet    SYNTHROID/LEVOTHROID    90 tablet    Take 1 tablet (25 mcg) by mouth daily    Hypothyroidism, unspecified type       losartan 25 MG tablet    COZAAR    180 tablet    Take 1 tablet (25 mg) by mouth 2 times daily    Benign essential hypertension       metoprolol 25 MG 24 hr tablet    TOPROL-XL    180  tablet    Take 1 tablet (25 mg) by mouth 2 times daily    Benign essential hypertension       multivitamin  with lutein Caps per capsule      Take 1 capsule by mouth daily        naproxen 375 MG tablet    NAPROSYN    60 tablet    Take 1 tablet (375 mg) by mouth 2 times daily (with meals)    Acute left-sided low back pain with left-sided sciatica       order for DME      Oxygen 2 Li/min at night    Other secondary hypertension       REMERON 15 MG tablet   Generic drug:  mirtazapine      Take 15 mg by mouth At Bedtime        VITAMIN D (CHOLECALCIFEROL) PO      Take 2,000 Units by mouth daily

## 2017-11-17 NOTE — PATIENT INSTRUCTIONS
Naproxen 325 mg twice a day with food for a week and then as needed.      Try to minimize use of the excedrin    Start physical therapy      Alejandra Watson M.D.      Thank you for choosing [facility].  You may be receiving a survey in the mail from [facility] regarding your visit today.  Please take a few minutes to complete and return the survey to let us know how we are doing.      Our Clinic hours are:  Mondays    7:20 am - 7 pm  Tues -  Fri  7:20 am - 5 pm    Clinic Phone: [phone]    The clinic lab opens at 7:30 am Mon - Fri and appointments are required.    [facility]  Ph. [phone]  Monday-Thursday 8 am - 7pm  Tues/Wed/Fri 8 am - 5:30 pm

## 2017-11-17 NOTE — PROGRESS NOTES
"  SUBJECTIVE:   Lara Wills is a 89 year old female who presents to clinic today for the following health issues:      Chief Complaint   Patient presents with     Back Pain     X 2 weeks patient has had this pain in low left side of back that travels down hip into left leg. Patient has had no falls or injury. Patient takes excedrin for pain. Patient currently rates pain 4/10.        SUBJECTIVE:  Lara Wills, a 89 year old female scheduled an appointment to discuss the following issues:  Acute left-sided low back pain with left-sided sciatica    Back Pain       Duration: 2 weeks        Specific cause: none    Description:   Location of pain: gluteus left  Character of pain: sharp  Pain radiation:radiates into the left buttocks and radiates into the left leg  New numbness or weakness in legs, not attributed to pain:  no     Intensity: Currently 4/10    History:   Pain interferes with job: Not applicable  History of back problems: no prior back problems  Any previous MRI or X-rays: None  Sees a specialist for back pain:  No  Therapies tried without relief: heat    Alleviating factors:   Improved by: excedrin      Precipitating factors:  Worsened by: Nothing    Functional and Psychosocial Screen ([employer] Back):      Not performed today           Accompanying Signs & Symptoms:  Risk of Fracture:  Age >64  Risk of Cauda Equina:  None  Risk of Infection:  None  Risk of Cancer:  None  Risk of Ankylosing Spondylitis:  Onset at age <35, male, AND morning back stiffness. no     Medical, social, surgical, and family histories reviewed.    ROS:  5 point ROS negative except as noted above in HPI, including Gen., Resp., CV, GI &  system review.    OBJECTIVE:  /65  Pulse 63  Resp 18  Ht 4' 11\" (1.499 m)  Wt 163 lb (73.9 kg)  Breastfeeding? No  BMI 32.92 kg/m2  EXAM:  GENERAL APPEARANCE: Alert, no acute distress  MS: back exam: normal posture, tenderness to palpitation bilateral SI joints and left " buttocks/piriformis, straight leg raise right neg, straight leg raise left neg, reflexes at knees 2/4, leg strength symmetrical and normal    ASSESSMENT/PLAN:    (M54.42) Acute left-sided low back pain with left-sided sciatica  (primary encounter diagnosis)  Comment:    Plan: PHYSICAL THERAPY REFERRAL, naproxen (NAPROSYN)         375 MG tablet        Recommend PT (at parmly) and short term NSAIDS.  If not improving would send her to [facility].          Patient Instructions     Naproxen 325 mg twice a day with food for a week and then as needed.      Try to minimize use of the excedrin    Start physical therapy      Alejandra Watson M.D.      Thank you for choosing [facility].  You may be receiving a survey in the mail from [employer] regarding your visit today.  Please take a few minutes to complete and return the survey to let us know how we are doing.      Our Clinic hours are:  Mondays    7:20 am - 7 pm  Tues -  Fri  7:20 am - 5 pm    Clinic Phone: [phone]    The clinic lab opens at 7:30 am Mon - Fri and appointments are required.    [facility]. [phone]  Monday-Thursday 8 am - 7pm  Tues/Wed/Fri 8 am - 5:30 pm

## 2017-11-19 DIAGNOSIS — I10 BENIGN ESSENTIAL HYPERTENSION: ICD-10-CM

## 2017-11-20 RX ORDER — AMLODIPINE BESYLATE 5 MG/1
TABLET ORAL
Qty: 180 TABLET | Refills: 1 | Status: SHIPPED | OUTPATIENT
Start: 2017-11-20 | End: 2018-01-01

## 2017-11-28 ENCOUNTER — OFFICE VISIT (OUTPATIENT)
Dept: SLEEP MEDICINE | Facility: CLINIC | Age: 82
End: 2017-11-28
Payer: COMMERCIAL

## 2017-11-28 VITALS
SYSTOLIC BLOOD PRESSURE: 128 MMHG | HEART RATE: 72 BPM | HEIGHT: 59 IN | OXYGEN SATURATION: 92 % | DIASTOLIC BLOOD PRESSURE: 74 MMHG

## 2017-11-28 DIAGNOSIS — G47.34 NOCTURNAL HYPOXEMIA: Primary | ICD-10-CM

## 2017-11-28 PROCEDURE — 99214 OFFICE O/P EST MOD 30 MIN: CPT | Performed by: FAMILY MEDICINE

## 2017-11-28 NOTE — MR AVS SNAPSHOT
"              After Visit Summary   11/28/2017    Lara Wills    MRN: [id number]           Patient Information     Date Of Birth          6/12/1922        Visit Information        Provider Department      11/28/2017 3:30 PM Noble Ojeda MD [facility]        Today's Diagnoses     Nocturnal hypoxemia    -  1      Care Instructions    Declined weight          Follow-ups after your visit        Who to contact     If you have questions or need follow up information about today's clinic visit or your schedule please contact [facility] directly at [phone].  Normal or non-critical lab and imaging results will be communicated to you by Domain[employer]hart, letter or phone within 4 business days after the clinic has received the results. If you do not hear from us within 7 days, please contact the clinic through [employer] or phone. If you have a critical or abnormal lab result, we will notify you by phone as soon as possible.  Submit refill requests through babberly or call your pharmacy and they will forward the refill request to us. Please allow 3 business days for your refill to be completed.          Additional Information About Your Visit        MyChart Information     [employer] gives you secure access to your electronic health record. If you see a primary care provider, you can also send messages to your care team and make appointments. If you have questions, please call your primary care clinic.  If you do not have a primary care provider, please call [phone] and they will assist you.        Care EveryWhere ID     This is your Care EveryWhere ID. This could be used by other organizations to access your [place] medical records  [id number]        Your Vitals Were     Pulse Height Pulse Oximetry             72 1.499 m (4' 11.02\") 92%          Blood Pressure from Last 3 Encounters:   11/28/17 128/74   11/17/17 136/65   10/31/17 142/68    Weight from Last 3 Encounters: "   11/17/17 73.9 kg (163 lb)   05/18/17 71.8 kg (158 lb 3.2 oz)   01/24/17 69.9 kg (154 lb)              Today, you had the following     No orders found for display       Primary Care Provider Office Phone # Fax #    Alejandra Watson [phone] [address] [address] [facility] [address]        Equal Access to Services     ABI SARAH : Hadii aad ku hadasho Soomaali, waaxda luqadaha, qaybta kaalmada adeegyada, waxay idiin hayaan adeeg kharash la'aan . So [facility] [phone].    ATENCIÓN: Si wilton pantoja, tiene a ro disposición servicios gratuitos de asistencia lingüística. Llame al [phone].    We comply with applicable federal civil rights laws and [place] laws. We do not discriminate on the basis of race, color, national origin, age, disability, sex, sexual orientation, or gender identity.            Thank you!     Thank you for choosing [facility]  for your care. Our goal is always to provide you with excellent care. Hearing back from our patients is one way we can continue to improve our services. Please take a few minutes to complete the written survey that you may receive in the mail after your visit with us. Thank you!             Your Updated Medication List - Protect others around you: Learn how to safely use, store and throw away your medicines at www.disposemymeds.org.          This list is accurate as of: 11/28/17 11:59 PM.  Always use your most recent med list.                   Brand Name Dispense Instructions for use Diagnosis    AMBIEN 5 MG tablet   Generic drug:  zolpidem     30 tablet    Take 0.5 tablets (2.5 mg) by mouth nightly as needed for sleep    Generalized anxiety disorder       amLODIPine 5 MG tablet    NORVASC    180 tablet    TAKE 1 TABLET BY MOUTH TWICE DAILY    Benign essential hypertension       brimonidine 0.2 % ophthalmic solution    ALPHAGAN     Place 1 drop Into the left eye 2 times daily        busPIRone 10 MG tablet    BUSPAR      Take 10 mg by mouth 2 times daily        calcium-vitamin D 600-400 MG-UNIT per tablet    CALTRATE     Take 1 tablet by mouth 2 times daily        docusate sodium 100 MG capsule    COLACE     Take 100 mg by mouth 3 times daily        DULoxetine 60 MG EC capsule    CYMBALTA     Take 60 mg by mouth daily        EXCEDRIN EXTRA STRENGTH 250-250-65 MG per tablet   Generic drug:  aspirin-acetaminophen-caffeine      Take 1 tablet by mouth every 6 hours as needed        latanoprost 0.005 % ophthalmic solution    XALATAN    1 Bottle    Place 1 drop into both eyes At Bedtime        levothyroxine 25 MCG tablet    SYNTHROID/LEVOTHROID    90 tablet    Take 1 tablet (25 mcg) by mouth daily    Hypothyroidism, unspecified type       losartan 25 MG tablet    COZAAR    180 tablet    Take 1 tablet (25 mg) by mouth 2 times daily    Benign essential hypertension       metoprolol 25 MG 24 hr tablet    TOPROL-XL    180 tablet    Take 1 tablet (25 mg) by mouth 2 times daily    Benign essential hypertension       multivitamin  with lutein Caps per capsule      Take 1 capsule by mouth daily        naproxen 375 MG tablet    NAPROSYN    60 tablet    Take 1 tablet (375 mg) by mouth 2 times daily (with meals)    Acute left-sided low back pain with left-sided sciatica       order for DME      Oxygen 2 Li/min at night    Other secondary hypertension       REMERON 15 MG tablet   Generic drug:  mirtazapine      Take 15 mg by mouth At Bedtime        VITAMIN D (CHOLECALCIFEROL) PO      Take 2,000 Units by mouth daily

## 2017-11-28 NOTE — NURSING NOTE
"Chief Complaint   Patient presents with     Sleep Problem     Follow up on Oxygen. Needs her yearly re certification. Needs documented in your notes that Patient continues to need night time oxygen.        Initial /74  Pulse 72  Ht 1.499 m (4' 11.02\")  SpO2 92% Estimated body mass index is 32.92 kg/(m^2) as calculated from the following:    Height as of 11/17/17: 1.499 m (4' 11\").    Weight as of 11/17/17: 73.9 kg (163 lb).  Medication Reconciliation: complete    "

## 2017-11-28 NOTE — PROGRESS NOTES
Sleep Follow-Up Visit:    Date on this visit: 11/28/2017    Lara Wills comes in today for follow-up to continue nocturnal supplemental oxygen.  Pertinent PMHx of severe labile HTN, depression, anxiety, hypothyroidism.    12/9/2016 - Initial consult as part of work-up for labile HTN with presumed secondary etiology, normal renal artery U/S.  A/P to start with overnight oximetry, if abnormal consider PSG versus empiric start of supplemental oxygen.    12/11/2016 - Overnight oximetry performed on room air.  Total recording time 10:37:52 with valid time of 10:37:52.  Lowest pulse rate 53 with average pulse rate of 61.7.  Lowest SpO2 81%, mean / baseline SpO2 88.3%.  SpO2 <= 88% for 190.7 minutes.  Oxygen desaturation index ~5.2 / hour (drop of 4% or greater).    12/27/2016 - Overnight oximetry on nocturnal supplemental oxygen at 2 LPM.  Total recording time 11:00:48 with valid time of 11:00:48.  Lowest pulse rate 50 with average pulse rate of 58.2.  Lowest SpO2 82%, mean / baseline SpO2 94.5%.  SpO2 < 88% for 0.6 minutes.  A/P to continue nocturnal supplemental oxygen at 2 LPM.    Today - Returns for follow-up prior to end of year to allow continuation of nocturnal supplemental oxygen.  She is joined by her daughter, Anh, today.  She feels she is doing well with her oxygen and no concerns.  Has had resolution of headaches, improved HTN control.  She does report difficulty falling asleep, follows closely with Jossue [facility] at [facility] for depression / anxiety.  Sleep seems stable with taper off zolpidem, start of mirtazepine.  She will go to bed between 9:30-10pm, feels tired at this time, believes she falls asleep around midnight (as late as 2am) and will awaken at 7am to use BR / medications, usually will go to back to sleep until 8:30-9am.    Past medical/surgical history, family history, social history, medications and allergies were reviewed.      Problem List:  Patient Active Problem List    Diagnosis  Date Noted     Hypothyroidism, unspecified type 11/09/2016     Priority: Medium     Benign essential hypertension 08/31/2016     Priority: Medium     Major depressive disorder, single episode, mild (H) 05/20/2016     Priority: Medium     Advance Care Planning 12/11/2013     Priority: Medium     Advance Care Planning 4/10/2017: Receipt of ACP document:  Received: POLST which was signed and dated by provider on 1/10/2017.  Document previously scanned on 1/23/2017.  Previous POLST received, signed and dated by provider on 8/19/2014. Orders reviewed and found to be valid.  Code Status needs to be updated to reflect choices in most recent ACP document. POLST states DNR/DNR. Confirmed/documented designated decision maker(s).  Added by Florence Puentes [facility] Advance Care Planning Liaison  Advance Care Planning 1/14/2014: Receipt of ACP document:  Received: Health Care Directive which was witnessed or notarized on 10/16/2009.  Document not previously scanned.  Validation form completed and sent with document to be scanned.  Code Status reflects choices in most recent ACP document.  Confirmed/documented designated decision maker(s). See permanent comments section of demographics in clinical tab. View document(s) and details by clicking on code status. Added by Milli Nunez on 1/14/2014.  Advance Care Planning 12/20/2013: Receipt of ACP document:  Received: invalid HCD document which was incomplete and not dated.  Document not previously scanned. Validation form completed indicating invalid document. Copy sent to client with information and facilitation resources. Validation form sent to be scanned as notation of invalid document received.  Code Status needs to be updated to reflect choices. Confirmed/documented designated decision maker(s). View document(s) and details by clicking on code status. Added by Kim Curry RN, System ACP Coordinator on 12/20/2013.  Advance Care Planning 12/11/2013: Patient has completed an  Advance/Health Care Directive (HCD), to bring in copy to be scanned into Epic.Apple Merrill December 11, 2013       Hyperlipidemia with target LDL less than 130 12/11/2013     Priority: Medium     Chronic constipation 12/11/2013     Priority: Medium     OA (osteoarthritis) of knee 12/11/2013     Priority: Medium     Generalized anxiety disorder 12/11/2013     Priority: Medium        Impression/Plan:    1.)  Significant sleep-associated hypoxemia, well treated with nocturnal supplemental oxygen at 2 LPM   - Well tolerated, effective per last overnight oximetry   - Plan to continue nocturnal supplemental oxygen    2.)  Sleep onset insomnia   - Currently appears stable with mirtazapine 15mg PO QHS   - We did discuss sleep agents with details on suvorexant with potential less fall risk.    She will follow up with me in about 1 year(s).     Twenty-five minutes spent with patient, all of which were spent face-to-face counseling, consulting, coordinating plan of care.      Noble Ojeda MD    CC: Alejandra Watson

## 2017-12-04 ENCOUNTER — MEDICAL CORRESPONDENCE (OUTPATIENT)
Dept: HEALTH INFORMATION MANAGEMENT | Facility: CLINIC | Age: 82
End: 2017-12-04

## 2017-12-04 PROCEDURE — G0180 MD CERTIFICATION HHA PATIENT: HCPCS | Performed by: FAMILY MEDICINE

## 2017-12-08 DIAGNOSIS — I10 BENIGN ESSENTIAL HYPERTENSION: ICD-10-CM

## 2017-12-08 RX ORDER — LOSARTAN POTASSIUM 25 MG/1
TABLET ORAL
Qty: 180 TABLET | Refills: 1 | Status: SHIPPED | OUTPATIENT
Start: 2017-12-08 | End: 2018-01-01

## 2017-12-10 DIAGNOSIS — E03.9 HYPOTHYROIDISM, UNSPECIFIED TYPE: ICD-10-CM

## 2017-12-13 RX ORDER — LEVOTHYROXINE SODIUM 25 UG/1
TABLET ORAL
Qty: 90 TABLET | Refills: 1 | Status: SHIPPED | OUTPATIENT
Start: 2017-12-13 | End: 2018-01-01

## 2018-01-01 ENCOUNTER — TRANSFERRED RECORDS (OUTPATIENT)
Dept: HEALTH INFORMATION MANAGEMENT | Facility: CLINIC | Age: 83
End: 2018-01-01

## 2018-01-01 ENCOUNTER — MEDICAL CORRESPONDENCE (OUTPATIENT)
Dept: HEALTH INFORMATION MANAGEMENT | Facility: CLINIC | Age: 83
End: 2018-01-01

## 2018-01-01 ENCOUNTER — RADIANT APPOINTMENT (OUTPATIENT)
Dept: RADIOLOGY | Facility: CLINIC | Age: 83
End: 2018-01-01
Attending: PSYCHIATRY & NEUROLOGY
Payer: COMMERCIAL

## 2018-01-01 ENCOUNTER — TELEPHONE (OUTPATIENT)
Dept: FAMILY MEDICINE | Facility: CLINIC | Age: 83
End: 2018-01-01

## 2018-01-01 ENCOUNTER — TELEPHONE (OUTPATIENT)
Dept: PEDIATRICS | Facility: CLINIC | Age: 83
End: 2018-01-01

## 2018-01-01 ENCOUNTER — HOSPITAL ENCOUNTER (OUTPATIENT)
Dept: MRI IMAGING | Facility: CLINIC | Age: 83
Discharge: HOME | End: 2018-11-02
Attending: PHYSICIAN ASSISTANT | Admitting: PHYSICIAN ASSISTANT
Payer: MEDICARE

## 2018-01-01 ENCOUNTER — NURSING HOME VISIT (OUTPATIENT)
Dept: GERIATRICS | Facility: CLINIC | Age: 83
End: 2018-01-01
Payer: COMMERCIAL

## 2018-01-01 ENCOUNTER — OFFICE VISIT (OUTPATIENT)
Dept: FAMILY MEDICINE | Facility: CLINIC | Age: 83
End: 2018-01-01
Payer: COMMERCIAL

## 2018-01-01 ENCOUNTER — TELEPHONE (OUTPATIENT)
Dept: PALLIATIVE MEDICINE | Facility: CLINIC | Age: 83
End: 2018-01-01

## 2018-01-01 ENCOUNTER — DISCHARGE SUMMARY NURSING HOME (OUTPATIENT)
Dept: GERIATRICS | Facility: CLINIC | Age: 83
End: 2018-01-01
Payer: COMMERCIAL

## 2018-01-01 ENCOUNTER — TELEPHONE (OUTPATIENT)
Dept: RADIOLOGY | Facility: CLINIC | Age: 83
End: 2018-01-01

## 2018-01-01 ENCOUNTER — RADIOLOGY INJECTION OFFICE VISIT (OUTPATIENT)
Dept: PALLIATIVE MEDICINE | Facility: CLINIC | Age: 83
End: 2018-01-01
Payer: COMMERCIAL

## 2018-01-01 VITALS
HEIGHT: 59 IN | DIASTOLIC BLOOD PRESSURE: 70 MMHG | OXYGEN SATURATION: 93 % | TEMPERATURE: 97.4 F | HEART RATE: 80 BPM | BODY MASS INDEX: 32.32 KG/M2 | RESPIRATION RATE: 18 BRPM | SYSTOLIC BLOOD PRESSURE: 134 MMHG

## 2018-01-01 VITALS
HEART RATE: 82 BPM | DIASTOLIC BLOOD PRESSURE: 81 MMHG | RESPIRATION RATE: 16 BRPM | OXYGEN SATURATION: 91 % | SYSTOLIC BLOOD PRESSURE: 153 MMHG

## 2018-01-01 VITALS
HEART RATE: 85 BPM | WEIGHT: 164 LBS | DIASTOLIC BLOOD PRESSURE: 86 MMHG | TEMPERATURE: 97.8 F | SYSTOLIC BLOOD PRESSURE: 136 MMHG | RESPIRATION RATE: 18 BRPM | BODY MASS INDEX: 33.12 KG/M2 | OXYGEN SATURATION: 93 %

## 2018-01-01 VITALS
BODY MASS INDEX: 33.33 KG/M2 | WEIGHT: 165 LBS | RESPIRATION RATE: 20 BRPM | OXYGEN SATURATION: 94 % | TEMPERATURE: 97.5 F | HEART RATE: 84 BPM | SYSTOLIC BLOOD PRESSURE: 134 MMHG | DIASTOLIC BLOOD PRESSURE: 75 MMHG

## 2018-01-01 VITALS
HEIGHT: 59 IN | SYSTOLIC BLOOD PRESSURE: 138 MMHG | DIASTOLIC BLOOD PRESSURE: 74 MMHG | OXYGEN SATURATION: 95 % | TEMPERATURE: 98.5 F | HEART RATE: 83 BPM | BODY MASS INDEX: 32.46 KG/M2 | RESPIRATION RATE: 18 BRPM | WEIGHT: 161 LBS

## 2018-01-01 VITALS
DIASTOLIC BLOOD PRESSURE: 69 MMHG | TEMPERATURE: 98 F | HEART RATE: 82 BPM | BODY MASS INDEX: 32.46 KG/M2 | WEIGHT: 161 LBS | HEIGHT: 59 IN | SYSTOLIC BLOOD PRESSURE: 109 MMHG | RESPIRATION RATE: 18 BRPM

## 2018-01-01 VITALS
TEMPERATURE: 97.7 F | RESPIRATION RATE: 20 BRPM | SYSTOLIC BLOOD PRESSURE: 132 MMHG | HEART RATE: 72 BPM | BODY MASS INDEX: 32.25 KG/M2 | HEIGHT: 59 IN | DIASTOLIC BLOOD PRESSURE: 72 MMHG | WEIGHT: 160 LBS

## 2018-01-01 DIAGNOSIS — R01.1 UNDIAGNOSED CARDIAC MURMURS: ICD-10-CM

## 2018-01-01 DIAGNOSIS — M54.50 LOW BACK PAIN: ICD-10-CM

## 2018-01-01 DIAGNOSIS — M54.42 CHRONIC LEFT-SIDED LOW BACK PAIN WITH LEFT-SIDED SCIATICA: Primary | ICD-10-CM

## 2018-01-01 DIAGNOSIS — S42.412D CLOSED SUPRACONDYLAR FRACTURE OF LEFT HUMERUS WITH ROUTINE HEALING, SUBSEQUENT ENCOUNTER: ICD-10-CM

## 2018-01-01 DIAGNOSIS — M54.16 LUMBAR RADICULOPATHY: ICD-10-CM

## 2018-01-01 DIAGNOSIS — I10 BENIGN ESSENTIAL HYPERTENSION: ICD-10-CM

## 2018-01-01 DIAGNOSIS — K64.4 EXTERNAL HEMORRHOIDS: Primary | ICD-10-CM

## 2018-01-01 DIAGNOSIS — E03.9 HYPOTHYROIDISM, UNSPECIFIED TYPE: ICD-10-CM

## 2018-01-01 DIAGNOSIS — G31.84 MILD COGNITIVE IMPAIRMENT: ICD-10-CM

## 2018-01-01 DIAGNOSIS — R41.0 DELIRIUM: ICD-10-CM

## 2018-01-01 DIAGNOSIS — R53.81 PHYSICAL DECONDITIONING: ICD-10-CM

## 2018-01-01 DIAGNOSIS — B37.2 CANDIDAL INTERTRIGO: ICD-10-CM
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
## 2018-01-01 DIAGNOSIS — S43.005A SHOULDER DISLOCATION, LEFT, INITIAL ENCOUNTER: Primary | ICD-10-CM

## 2018-01-01 DIAGNOSIS — F32.0 MAJOR DEPRESSIVE DISORDER, SINGLE EPISODE, MILD (H): ICD-10-CM

## 2018-01-01 DIAGNOSIS — M54.16 LUMBAR RADICULOPATHY: Primary | ICD-10-CM

## 2018-01-01 DIAGNOSIS — K59.09 CHRONIC CONSTIPATION: ICD-10-CM

## 2018-01-01 DIAGNOSIS — G89.29 CHRONIC LEFT-SIDED LOW BACK PAIN WITH LEFT-SIDED SCIATICA: Primary | ICD-10-CM

## 2018-01-01 DIAGNOSIS — H90.3 BILATERAL SENSORINEURAL HEARING LOSS: ICD-10-CM

## 2018-01-01 DIAGNOSIS — F41.1 GENERALIZED ANXIETY DISORDER: ICD-10-CM

## 2018-01-01 DIAGNOSIS — S42.412D CLOSED SUPRACONDYLAR FRACTURE OF LEFT HUMERUS WITH ROUTINE HEALING, SUBSEQUENT ENCOUNTER: Primary | ICD-10-CM

## 2018-01-01 DIAGNOSIS — H61.22 IMPACTED CERUMEN OF LEFT EAR: Primary | ICD-10-CM

## 2018-01-01 DIAGNOSIS — E78.5 HYPERLIPIDEMIA WITH TARGET LDL LESS THAN 130: ICD-10-CM

## 2018-01-01 DIAGNOSIS — R09.02 HYPOXEMIA: ICD-10-CM

## 2018-01-01 LAB
ANION GAP SERPL CALCULATED.3IONS-SCNC: 6 MMOL/L (ref 3–14)
BUN SERPL-MCNC: 21 MG/DL (ref 7–30)
CALCIUM SERPL-MCNC: 8.8 MG/DL (ref 8.5–10.1)
CHLORIDE SERPL-SCNC: 105 MMOL/L (ref 94–109)
CHOLEST SERPL-MCNC: 251 MG/DL
CO2 SERPL-SCNC: 29 MMOL/L (ref 20–32)
CREAT SERPL-MCNC: 1.02 MG/DL (ref 0.52–1.04)
GFR SERPL CREATININE-BSD FRML MDRD: 50 ML/MIN/1.7M2
GLUCOSE SERPL-MCNC: 94 MG/DL (ref 70–99)
HDLC SERPL-MCNC: 85 MG/DL
LDLC SERPL CALC-MCNC: 136 MG/DL
NONHDLC SERPL-MCNC: 166 MG/DL
POTASSIUM SERPL-SCNC: 3.9 MMOL/L (ref 3.4–5.3)
SODIUM SERPL-SCNC: 140 MMOL/L (ref 133–144)
T4 FREE SERPL-MCNC: 0.95 NG/DL (ref 0.76–1.46)
TRIGL SERPL-MCNC: 151 MG/DL
TSH SERPL DL<=0.005 MIU/L-ACNC: 7.94 MU/L (ref 0.4–4)

## 2018-01-01 PROCEDURE — 72148 MRI LUMBAR SPINE W/O DYE: CPT

## 2018-01-01 PROCEDURE — 99213 OFFICE O/P EST LOW 20 MIN: CPT | Performed by: NURSE PRACTITIONER

## 2018-01-01 PROCEDURE — 84439 ASSAY OF FREE THYROXINE: CPT | Performed by: FAMILY MEDICINE

## 2018-01-01 PROCEDURE — 36415 COLL VENOUS BLD VENIPUNCTURE: CPT | Performed by: FAMILY MEDICINE

## 2018-01-01 PROCEDURE — 99214 OFFICE O/P EST MOD 30 MIN: CPT | Performed by: FAMILY MEDICINE

## 2018-01-01 PROCEDURE — 99316 NF DSCHRG MGMT 30 MIN+: CPT | Performed by: NURSE PRACTITIONER

## 2018-01-01 PROCEDURE — 99213 OFFICE O/P EST LOW 20 MIN: CPT | Mod: 25 | Performed by: FAMILY MEDICINE

## 2018-01-01 PROCEDURE — 84443 ASSAY THYROID STIM HORMONE: CPT | Performed by: FAMILY MEDICINE

## 2018-01-01 PROCEDURE — 80061 LIPID PANEL: CPT | Performed by: FAMILY MEDICINE

## 2018-01-01 PROCEDURE — 99310 SBSQ NF CARE HIGH MDM 45: CPT | Performed by: NURSE PRACTITIONER

## 2018-01-01 PROCEDURE — 69209 REMOVE IMPACTED EAR WAX UNI: CPT | Mod: LT | Performed by: FAMILY MEDICINE

## 2018-01-01 PROCEDURE — 80048 BASIC METABOLIC PNL TOTAL CA: CPT | Performed by: FAMILY MEDICINE

## 2018-01-01 PROCEDURE — 99213 OFFICE O/P EST LOW 20 MIN: CPT | Performed by: FAMILY MEDICINE

## 2018-01-01 PROCEDURE — 62323 NJX INTERLAMINAR LMBR/SAC: CPT | Performed by: PSYCHIATRY & NEUROLOGY

## 2018-01-01 RX ORDER — LOSARTAN POTASSIUM 25 MG/1
TABLET ORAL
Qty: 180 TABLET | Refills: 1 | Status: SHIPPED | OUTPATIENT
Start: 2018-01-01 | End: 2018-01-01

## 2018-01-01 RX ORDER — METOPROLOL SUCCINATE 25 MG/1
25 TABLET, EXTENDED RELEASE ORAL DAILY
Qty: 90 TABLET | Refills: 1 | Status: SHIPPED | OUTPATIENT
Start: 2018-01-01

## 2018-01-01 RX ORDER — LOSARTAN POTASSIUM 25 MG/1
TABLET ORAL
Qty: 180 TABLET | Refills: 1 | Status: SHIPPED | OUTPATIENT
Start: 2018-01-01

## 2018-01-01 RX ORDER — LEVOTHYROXINE SODIUM 25 UG/1
TABLET ORAL
Qty: 90 TABLET | Refills: 1 | Status: SHIPPED | OUTPATIENT
Start: 2018-01-01 | End: 2018-01-01

## 2018-01-01 RX ORDER — AMLODIPINE BESYLATE 5 MG/1
TABLET ORAL
Qty: 180 TABLET | Refills: 1 | Status: SHIPPED | OUTPATIENT
Start: 2018-01-01

## 2018-01-01 RX ORDER — AMLODIPINE BESYLATE 5 MG/1
TABLET ORAL
Qty: 180 TABLET | Refills: 1 | Status: SHIPPED | OUTPATIENT
Start: 2018-01-01 | End: 2018-01-01

## 2018-01-01 RX ORDER — LEVOTHYROXINE SODIUM 50 UG/1
50 TABLET ORAL DAILY
Qty: 90 TABLET | Refills: 3 | Status: SHIPPED | OUTPATIENT
Start: 2018-01-01

## 2018-01-01 RX ORDER — METOPROLOL SUCCINATE 25 MG/1
25 TABLET, EXTENDED RELEASE ORAL DAILY
Qty: 90 TABLET | Refills: 1 | Status: SHIPPED | OUTPATIENT
Start: 2018-01-01 | End: 2018-01-01

## 2018-01-01 RX ORDER — LEVOTHYROXINE SODIUM 25 UG/1
TABLET ORAL
Qty: 90 TABLET | Refills: 1 | Status: SHIPPED | OUTPATIENT
Start: 2018-01-01 | End: 2018-01-01 | Stop reason: DRUGHIGH

## 2018-01-01 ASSESSMENT — PAIN SCALES - GENERAL
PAINLEVEL: EXTREME PAIN (8)
PAINLEVEL: MILD PAIN (3)
PAINLEVEL: NO PAIN (0)
PAINLEVEL: MODERATE PAIN (5)

## 2018-01-01 ASSESSMENT — PATIENT HEALTH QUESTIONNAIRE - PHQ9: SUM OF ALL RESPONSES TO PHQ QUESTIONS 1-9: 0

## 2018-01-01 ASSESSMENT — MIFFLIN-ST. JEOR: SCORE: 1021.39

## 2018-01-02 ENCOUNTER — MEDICAL CORRESPONDENCE (OUTPATIENT)
Dept: HEALTH INFORMATION MANAGEMENT | Facility: CLINIC | Age: 83
End: 2018-01-02

## 2018-01-08 ENCOUNTER — MEDICAL CORRESPONDENCE (OUTPATIENT)
Dept: HEALTH INFORMATION MANAGEMENT | Facility: CLINIC | Age: 83
End: 2018-01-08

## 2018-01-24 ENCOUNTER — TELEPHONE (OUTPATIENT)
Dept: FAMILY MEDICINE | Facility: CLINIC | Age: 83
End: 2018-01-24

## 2018-02-03 ENCOUNTER — ANESTHESIA (OUTPATIENT)
Dept: EMERGENCY MEDICINE | Facility: CLINIC | Age: 83
DRG: 562 | End: 2018-02-03
Payer: MEDICARE

## 2018-02-03 ENCOUNTER — HOSPITAL ENCOUNTER (INPATIENT)
Facility: CLINIC | Age: 83
LOS: 3 days | Discharge: NURSING, REHAB OR LONG-TERM CARE | DRG: 562 | End: 2018-02-06
Attending: FAMILY MEDICINE | Admitting: FAMILY MEDICINE
Payer: MEDICARE

## 2018-02-03 ENCOUNTER — APPOINTMENT (OUTPATIENT)
Dept: GENERAL RADIOLOGY | Facility: CLINIC | Age: 83
DRG: 562 | End: 2018-02-03
Attending: FAMILY MEDICINE
Payer: MEDICARE

## 2018-02-03 ENCOUNTER — ANESTHESIA EVENT (OUTPATIENT)
Dept: EMERGENCY MEDICINE | Facility: CLINIC | Age: 83
DRG: 562 | End: 2018-02-03
Payer: MEDICARE

## 2018-02-03 DIAGNOSIS — S43.005A SHOULDER DISLOCATION, LEFT, INITIAL ENCOUNTER: ICD-10-CM
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
## 2018-02-03 DIAGNOSIS — S42.412D CLOSED SUPRACONDYLAR FRACTURE OF LEFT HUMERUS WITH ROUTINE HEALING, SUBSEQUENT ENCOUNTER: Primary | ICD-10-CM
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
## 2018-02-03 PROBLEM — S42.412A CLOSED SUPRACONDYLAR FRACTURE OF LEFT HUMERUS: Status: ACTIVE | Noted: 2018-02-03

## 2018-02-03 LAB
ALBUMIN UR-MCNC: NEGATIVE MG/DL
ANION GAP SERPL CALCULATED.3IONS-SCNC: 8 MMOL/L (ref 3–14)
APPEARANCE UR: CLEAR
BASOPHILS # BLD AUTO: 0.1 10E9/L (ref 0–0.2)
BASOPHILS NFR BLD AUTO: 1 %
BILIRUB UR QL STRIP: NEGATIVE
BUN SERPL-MCNC: 15 MG/DL (ref 7–30)
CALCIUM SERPL-MCNC: 8.4 MG/DL (ref 8.5–10.1)
CHLORIDE SERPL-SCNC: 106 MMOL/L (ref 94–109)
CO2 SERPL-SCNC: 29 MMOL/L (ref 20–32)
COLOR UR AUTO: ABNORMAL
CREAT SERPL-MCNC: 0.78 MG/DL (ref 0.52–1.04)
DIFFERENTIAL METHOD BLD: NORMAL
EOSINOPHIL # BLD AUTO: 0.2 10E9/L (ref 0–0.7)
EOSINOPHIL NFR BLD AUTO: 1.8 %
ERYTHROCYTE [DISTWIDTH] IN BLOOD BY AUTOMATED COUNT: 12.9 % (ref 10–15)
GFR SERPL CREATININE-BSD FRML MDRD: 68 ML/MIN/1.7M2
GLUCOSE SERPL-MCNC: 103 MG/DL (ref 70–99)
GLUCOSE UR STRIP-MCNC: NEGATIVE MG/DL
HCT VFR BLD AUTO: 39.5 % (ref 35–47)
HGB BLD-MCNC: 13.1 G/DL (ref 11.7–15.7)
HGB UR QL STRIP: NEGATIVE
IMM GRANULOCYTES # BLD: 0 10E9/L (ref 0–0.4)
IMM GRANULOCYTES NFR BLD: 0.3 %
KETONES UR STRIP-MCNC: NEGATIVE MG/DL
LEUKOCYTE ESTERASE UR QL STRIP: NEGATIVE
LYMPHOCYTES # BLD AUTO: 1.6 10E9/L (ref 0.8–5.3)
LYMPHOCYTES NFR BLD AUTO: 16.3 %
MCH RBC QN AUTO: 29.8 PG (ref 26.5–33)
MCHC RBC AUTO-ENTMCNC: 33.2 G/DL (ref 31.5–36.5)
MCV RBC AUTO: 90 FL (ref 78–100)
MONOCYTES # BLD AUTO: 0.8 10E9/L (ref 0–1.3)
MONOCYTES NFR BLD AUTO: 8.2 %
MUCOUS THREADS #/AREA URNS LPF: PRESENT /LPF
NEUTROPHILS # BLD AUTO: 7 10E9/L (ref 1.6–8.3)
NEUTROPHILS NFR BLD AUTO: 72.4 %
NITRATE UR QL: NEGATIVE
PH UR STRIP: 7.5 PH (ref 5–7)
PLATELET # BLD AUTO: 393 10E9/L (ref 150–450)
POTASSIUM SERPL-SCNC: 3.5 MMOL/L (ref 3.4–5.3)
RBC # BLD AUTO: 4.39 10E12/L (ref 3.8–5.2)
RBC #/AREA URNS AUTO: 1 /HPF (ref 0–2)
SODIUM SERPL-SCNC: 143 MMOL/L (ref 133–144)
SOURCE: ABNORMAL
SP GR UR STRIP: 1.01 (ref 1–1.03)
UROBILINOGEN UR STRIP-MCNC: NORMAL MG/DL (ref 0–2)
WBC # BLD AUTO: 9.6 10E9/L (ref 4–11)
WBC #/AREA URNS AUTO: 0 /HPF (ref 0–2)

## 2018-02-03 PROCEDURE — 40000671 ZZH STATISTIC ANESTHESIA CASE

## 2018-02-03 PROCEDURE — 96361 HYDRATE IV INFUSION ADD-ON: CPT | Performed by: FAMILY MEDICINE

## 2018-02-03 PROCEDURE — 23650 CLTX SHO DSLC W/MNPJ WO ANES: CPT | Performed by: FAMILY MEDICINE

## 2018-02-03 PROCEDURE — 96376 TX/PRO/DX INJ SAME DRUG ADON: CPT | Performed by: FAMILY MEDICINE

## 2018-02-03 PROCEDURE — 85025 COMPLETE CBC W/AUTO DIFF WBC: CPT | Performed by: FAMILY MEDICINE

## 2018-02-03 PROCEDURE — 96375 TX/PRO/DX INJ NEW DRUG ADDON: CPT | Performed by: FAMILY MEDICINE

## 2018-02-03 PROCEDURE — 99222 1ST HOSP IP/OBS MODERATE 55: CPT | Mod: AI | Performed by: FAMILY MEDICINE

## 2018-02-03 PROCEDURE — 73030 X-RAY EXAM OF SHOULDER: CPT | Mod: LT

## 2018-02-03 PROCEDURE — 96374 THER/PROPH/DIAG INJ IV PUSH: CPT | Performed by: FAMILY MEDICINE

## 2018-02-03 PROCEDURE — 23650 CLTX SHO DSLC W/MNPJ WO ANES: CPT | Mod: 54 | Performed by: FAMILY MEDICINE

## 2018-02-03 PROCEDURE — A9270 NON-COVERED ITEM OR SERVICE: HCPCS | Mod: GY | Performed by: FAMILY MEDICINE

## 2018-02-03 PROCEDURE — 25000132 ZZH RX MED GY IP 250 OP 250 PS 637: Mod: GY | Performed by: FAMILY MEDICINE

## 2018-02-03 PROCEDURE — 25000125 ZZHC RX 250: Performed by: NURSE ANESTHETIST, CERTIFIED REGISTERED

## 2018-02-03 PROCEDURE — 80048 BASIC METABOLIC PNL TOTAL CA: CPT | Performed by: FAMILY MEDICINE

## 2018-02-03 PROCEDURE — 73060 X-RAY EXAM OF HUMERUS: CPT | Mod: LT

## 2018-02-03 PROCEDURE — 25000128 H RX IP 250 OP 636: Performed by: NURSE ANESTHETIST, CERTIFIED REGISTERED

## 2018-02-03 PROCEDURE — 99285 EMERGENCY DEPT VISIT HI MDM: CPT | Mod: 25 | Performed by: FAMILY MEDICINE

## 2018-02-03 PROCEDURE — 25000132 ZZH RX MED GY IP 250 OP 250 PS 637: Mod: GY | Performed by: INTERNAL MEDICINE

## 2018-02-03 PROCEDURE — A9270 NON-COVERED ITEM OR SERVICE: HCPCS | Mod: GY | Performed by: INTERNAL MEDICINE

## 2018-02-03 PROCEDURE — 40000986 XR SHOULDER LT PORT G/E 2 VW: Mod: LT

## 2018-02-03 PROCEDURE — 25000128 H RX IP 250 OP 636: Performed by: FAMILY MEDICINE

## 2018-02-03 PROCEDURE — 12000000 ZZH R&B MED SURG/OB

## 2018-02-03 PROCEDURE — 81001 URINALYSIS AUTO W/SCOPE: CPT | Performed by: FAMILY MEDICINE

## 2018-02-03 PROCEDURE — 0PSDXZZ REPOSITION LEFT HUMERAL HEAD, EXTERNAL APPROACH: ICD-10-PCS | Performed by: FAMILY MEDICINE

## 2018-02-03 RX ORDER — I-VITE, TAB 1000-60-2MG (60/BT) 300MCG-200
1 TAB ORAL DAILY
Status: DISCONTINUED | OUTPATIENT
Start: 2018-02-03 | End: 2018-02-06 | Stop reason: HOSPADM

## 2018-02-03 RX ORDER — ACETAMINOPHEN 325 MG/1
975 TABLET ORAL EVERY 6 HOURS PRN
Status: DISCONTINUED | OUTPATIENT
Start: 2018-02-03 | End: 2018-02-04

## 2018-02-03 RX ORDER — HYDROMORPHONE HYDROCHLORIDE 1 MG/ML
0.5 INJECTION, SOLUTION INTRAMUSCULAR; INTRAVENOUS; SUBCUTANEOUS
Status: DISCONTINUED | OUTPATIENT
Start: 2018-02-03 | End: 2018-02-03

## 2018-02-03 RX ORDER — HYDROMORPHONE HCL/0.9% NACL/PF 0.2MG/0.2
0.2 SYRINGE (ML) INTRAVENOUS
Status: DISCONTINUED | OUTPATIENT
Start: 2018-02-03 | End: 2018-02-03

## 2018-02-03 RX ORDER — MIRTAZAPINE 15 MG/1
15 TABLET, FILM COATED ORAL AT BEDTIME
Status: DISCONTINUED | OUTPATIENT
Start: 2018-02-03 | End: 2018-02-06 | Stop reason: HOSPADM

## 2018-02-03 RX ORDER — DOCUSATE SODIUM 100 MG/1
100 CAPSULE, LIQUID FILLED ORAL 3 TIMES DAILY
Status: DISCONTINUED | OUTPATIENT
Start: 2018-02-03 | End: 2018-02-06 | Stop reason: HOSPADM

## 2018-02-03 RX ORDER — ONDANSETRON 4 MG/1
4 TABLET, ORALLY DISINTEGRATING ORAL EVERY 6 HOURS PRN
Status: DISCONTINUED | OUTPATIENT
Start: 2018-02-03 | End: 2018-02-06 | Stop reason: HOSPADM

## 2018-02-03 RX ORDER — HYDROMORPHONE HYDROCHLORIDE 1 MG/ML
0.5 INJECTION, SOLUTION INTRAMUSCULAR; INTRAVENOUS; SUBCUTANEOUS
Status: COMPLETED | OUTPATIENT
Start: 2018-02-03 | End: 2018-02-03

## 2018-02-03 RX ORDER — NALOXONE HYDROCHLORIDE 0.4 MG/ML
.1-.4 INJECTION, SOLUTION INTRAMUSCULAR; INTRAVENOUS; SUBCUTANEOUS
Status: DISCONTINUED | OUTPATIENT
Start: 2018-02-03 | End: 2018-02-06 | Stop reason: HOSPADM

## 2018-02-03 RX ORDER — DULOXETIN HYDROCHLORIDE 30 MG/1
60 CAPSULE, DELAYED RELEASE ORAL DAILY
Status: DISCONTINUED | OUTPATIENT
Start: 2018-02-03 | End: 2018-02-06 | Stop reason: HOSPADM

## 2018-02-03 RX ORDER — BRIMONIDINE TARTRATE 2 MG/ML
1 SOLUTION/ DROPS OPHTHALMIC 2 TIMES DAILY
Status: DISCONTINUED | OUTPATIENT
Start: 2018-02-03 | End: 2018-02-06 | Stop reason: HOSPADM

## 2018-02-03 RX ORDER — AMLODIPINE BESYLATE 5 MG/1
5 TABLET ORAL 2 TIMES DAILY
Status: DISCONTINUED | OUTPATIENT
Start: 2018-02-03 | End: 2018-02-06 | Stop reason: HOSPADM

## 2018-02-03 RX ORDER — ONDANSETRON 2 MG/ML
4 INJECTION INTRAMUSCULAR; INTRAVENOUS EVERY 30 MIN PRN
Status: DISCONTINUED | OUTPATIENT
Start: 2018-02-03 | End: 2018-02-03

## 2018-02-03 RX ORDER — LOSARTAN POTASSIUM 25 MG/1
25 TABLET ORAL 2 TIMES DAILY
Status: DISCONTINUED | OUTPATIENT
Start: 2018-02-03 | End: 2018-02-05

## 2018-02-03 RX ORDER — PROPOFOL 10 MG/ML
INJECTION, EMULSION INTRAVENOUS PRN
Status: DISCONTINUED | OUTPATIENT
Start: 2018-02-03 | End: 2018-02-03

## 2018-02-03 RX ORDER — LATANOPROST 50 UG/ML
1 SOLUTION/ DROPS OPHTHALMIC AT BEDTIME
Status: DISCONTINUED | OUTPATIENT
Start: 2018-02-03 | End: 2018-02-06 | Stop reason: HOSPADM

## 2018-02-03 RX ORDER — SODIUM CHLORIDE 9 MG/ML
INJECTION, SOLUTION INTRAVENOUS CONTINUOUS PRN
Status: DISCONTINUED | OUTPATIENT
Start: 2018-02-03 | End: 2018-02-03

## 2018-02-03 RX ORDER — BUSPIRONE HYDROCHLORIDE 10 MG/1
10 TABLET ORAL 2 TIMES DAILY
Status: DISCONTINUED | OUTPATIENT
Start: 2018-02-03 | End: 2018-02-06 | Stop reason: HOSPADM

## 2018-02-03 RX ORDER — ONDANSETRON 2 MG/ML
4 INJECTION INTRAMUSCULAR; INTRAVENOUS EVERY 6 HOURS PRN
Status: DISCONTINUED | OUTPATIENT
Start: 2018-02-03 | End: 2018-02-06 | Stop reason: HOSPADM

## 2018-02-03 RX ORDER — SODIUM CHLORIDE 9 MG/ML
1000 INJECTION, SOLUTION INTRAVENOUS CONTINUOUS
Status: DISCONTINUED | OUTPATIENT
Start: 2018-02-03 | End: 2018-02-04

## 2018-02-03 RX ORDER — LEVOTHYROXINE SODIUM 25 UG/1
25 TABLET ORAL
Status: DISCONTINUED | OUTPATIENT
Start: 2018-02-04 | End: 2018-02-06 | Stop reason: HOSPADM

## 2018-02-03 RX ORDER — VIT C/E/ZN/COPPR/LUTEIN/ZEAXAN 60 MG-6 MG
1 CAPSULE ORAL DAILY
Status: DISCONTINUED | OUTPATIENT
Start: 2018-02-03 | End: 2018-02-03 | Stop reason: CLARIF

## 2018-02-03 RX ORDER — METOPROLOL SUCCINATE 25 MG/1
25 TABLET, EXTENDED RELEASE ORAL 2 TIMES DAILY
Status: DISCONTINUED | OUTPATIENT
Start: 2018-02-03 | End: 2018-02-06 | Stop reason: HOSPADM

## 2018-02-03 RX ORDER — HYDROMORPHONE HYDROCHLORIDE 1 MG/ML
.3-.5 INJECTION, SOLUTION INTRAMUSCULAR; INTRAVENOUS; SUBCUTANEOUS
Status: DISCONTINUED | OUTPATIENT
Start: 2018-02-03 | End: 2018-02-04

## 2018-02-03 RX ORDER — LIDOCAINE HYDROCHLORIDE 10 MG/ML
INJECTION, SOLUTION INFILTRATION; PERINEURAL PRN
Status: DISCONTINUED | OUTPATIENT
Start: 2018-02-03 | End: 2018-02-03

## 2018-02-03 RX ADMIN — ONDANSETRON 4 MG: 2 INJECTION INTRAMUSCULAR; INTRAVENOUS at 10:13

## 2018-02-03 RX ADMIN — SODIUM CHLORIDE 1000 ML: 9 INJECTION, SOLUTION INTRAVENOUS at 21:36

## 2018-02-03 RX ADMIN — METOPROLOL SUCCINATE 25 MG: 25 TABLET, EXTENDED RELEASE ORAL at 21:08

## 2018-02-03 RX ADMIN — VITAMIN D, TAB 1000IU (100/BT) 2000 UNITS: 25 TAB at 18:40

## 2018-02-03 RX ADMIN — DOCUSATE SODIUM 100 MG: 100 CAPSULE, LIQUID FILLED ORAL at 21:08

## 2018-02-03 RX ADMIN — Medication 0.5 MG: at 16:13

## 2018-02-03 RX ADMIN — DULOXETINE HYDROCHLORIDE 60 MG: 30 CAPSULE, DELAYED RELEASE PELLETS ORAL at 18:40

## 2018-02-03 RX ADMIN — Medication 0.5 MG: at 10:14

## 2018-02-03 RX ADMIN — BRIMONIDINE TARTRATE 1 DROP: 2 SOLUTION/ DROPS OPHTHALMIC at 21:08

## 2018-02-03 RX ADMIN — SODIUM CHLORIDE: 9 INJECTION, SOLUTION INTRAVENOUS at 11:40

## 2018-02-03 RX ADMIN — Medication 0.5 MG: at 11:05

## 2018-02-03 RX ADMIN — ONDANSETRON 4 MG: 2 INJECTION INTRAMUSCULAR; INTRAVENOUS at 16:10

## 2018-02-03 RX ADMIN — PROPOFOL 60 MG: 10 INJECTION, EMULSION INTRAVENOUS at 12:58

## 2018-02-03 RX ADMIN — LIDOCAINE HYDROCHLORIDE 50 MG: 10 INJECTION, SOLUTION INFILTRATION; PERINEURAL at 12:58

## 2018-02-03 RX ADMIN — MIRTAZAPINE 15 MG: 15 TABLET, FILM COATED ORAL at 21:56

## 2018-02-03 RX ADMIN — BUSPIRONE HYDROCHLORIDE 10 MG: 10 TABLET ORAL at 21:08

## 2018-02-03 RX ADMIN — CALCIUM CARBONATE-VITAMIN D TAB 500 MG-200 UNIT 1 TABLET: 500-200 TAB at 18:40

## 2018-02-03 RX ADMIN — LOSARTAN POTASSIUM 25 MG: 25 TABLET, FILM COATED ORAL at 21:08

## 2018-02-03 RX ADMIN — SODIUM CHLORIDE 1000 ML: 9 INJECTION, SOLUTION INTRAVENOUS at 10:13

## 2018-02-03 RX ADMIN — Medication 0.5 MG: at 12:01

## 2018-02-03 RX ADMIN — ACETAMINOPHEN 975 MG: 325 TABLET, FILM COATED ORAL at 18:39

## 2018-02-03 RX ADMIN — I-VITE, TAB 1000-60-2MG (60/BT) 1 TABLET: TAB at 18:40

## 2018-02-03 RX ADMIN — Medication 5 MG: at 23:18

## 2018-02-03 RX ADMIN — MICONAZOLE NITRATE: 2 POWDER TOPICAL at 21:19

## 2018-02-03 RX ADMIN — AMLODIPINE BESYLATE 5 MG: 5 TABLET ORAL at 21:08

## 2018-02-03 RX ADMIN — LATANOPROST 1 DROP: 50 SOLUTION OPHTHALMIC at 21:55

## 2018-02-03 RX ADMIN — Medication 0.5 MG: at 21:18

## 2018-02-03 RX ADMIN — MICONAZOLE NITRATE: 2 POWDER TOPICAL at 17:51

## 2018-02-03 RX ADMIN — SODIUM CHLORIDE 1000 ML: 9 INJECTION, SOLUTION INTRAVENOUS at 16:51

## 2018-02-03 ASSESSMENT — ACTIVITIES OF DAILY LIVING (ADL)
TRANSFERRING: 1-->ASSISTIVE EQUIPMENT
COGNITION: 0 - NO COGNITION ISSUES REPORTED
RETIRED_EATING: 0-->INDEPENDENT
BATHING: 0-->INDEPENDENT
DRESS: 0-->INDEPENDENT
RETIRED_COMMUNICATION: 0-->UNDERSTANDS/COMMUNICATES WITHOUT DIFFICULTY
SWALLOWING: 0-->SWALLOWS FOODS/LIQUIDS WITHOUT DIFFICULTY
AMBULATION: 0-->INDEPENDENT
TOILETING: 0-->INDEPENDENT

## 2018-02-03 NOTE — ED NOTES
Mobility is severely diminished with shoulder immobilizer in place, as the patient uses a walker at home.   MD updated. And awaiting orders.

## 2018-02-03 NOTE — ED NOTES
Pt was incontinent of urine, cleansed and noted rash to the pannicular fold center abdomen and to the groin area, worse on the left side. Family at bedside. Placed on 02 for room air sats of 91%. EMS administered 25 mics fentanyl intranasally.

## 2018-02-03 NOTE — ANESTHESIA CARE TRANSFER NOTE
Patient: Lara Wills    * No procedures listed *    Diagnosis: * No pre-op diagnosis entered *  Diagnosis Additional Information: No value filed.    Anesthesia Type:   MAC     Note:  Airway :Nasal Cannula  Patient transferred to:Emergency Department  Handoff Report: Identifed the Patient, Identified the Reponsible Provider, Reviewed the pertinent medical history, Discussed the surgical course, Reviewed Intra-OP anesthesia mangement and issues during anesthesia, Set expectations for post-procedure period and Allowed opportunity for questions and acknowledgement of understanding      Vitals: (Last set prior to Anesthesia Care Transfer)    CRNA VITALS  2/3/2018 1241 - 2/3/2018 1312      2/3/2018             NIBP: (!)  165/92    Pulse: 77    SpO2: 92 %    EKG: Sinus rhythm                Electronically Signed By: ROSANGELA Pruitt CRNA  February 3, 2018  1:12 PM

## 2018-02-03 NOTE — LETTER
Transition Communication Hand-off for Care Transitions to Next Level of Care Provider    Name: Lara Wills  MRN #: [id number]  Primary Care Provider: Alejandra Watson  Primary Care MD Name: Dr. Watson  Primary Clinic: [id number] [facility] [address]  Primary Care Clinic Name: [facility]  Reason for Hospitalization:  Fall, initial encounter [W19.XXXA]  Shoulder dislocation, left, initial encounter [S43.005A]  Admit Date/Time: 2/3/2018  9:30 AM  Discharge Date: 2/6/18  Payor Source: Payor: MEDICA / Plan: MEDICA PRIME SOLUTION / Product Type: Indemnity /     Readmission Assessment Measure (ABHAY) Risk Score/category: average           Reason for Communication Hand-off Referral: Fragility    Discharge Plan:TCU       Concern for non-adherence with plan of care:   Y/N no  Discharge Needs Assessment:  Needs       Most Recent Value    Transportation Available none    Assisted Living -- [[facility]]    Skilled Nursing Facility [facility] [phone], Fax: [phone]    PAS Number -- [[id number]]          Already enrolled in Tele-monitoring program and name of program:  no  Follow-up specialty is recommended: No    Follow-up plan:  Future Appointments  Date Time Provider Department Center   2/6/2018 1:00 PM Vika Oleary, PT [facility]         Connell Recommendations:  Pt will dc today to [facility] (Main: [phone] Admissions: [phone] Fax: [phone]) TCU. Pt lives at  [facility] (phone: [phone] Fax: [phone]) and the goal will be to return there following TCU stay. Family involved in care.   Tracy Beckman MSW, LICSW, [phone]    AVS/Discharge Summary is the source of truth; this is a helpful guide for improved communication of patient story

## 2018-02-03 NOTE — H&P
History and Physical   Hospitalist Service  2/3/2018    Kiah Wills  MRN# [id number]                          YOB: 1922  Age: 89 year old      Date of Admission:  2/3/2018         Primary Care Physician:   Alejandra Watson [phone]         Assessment and Plan:   Active Problems:    Shoulder dislocation, left, initial encounter- reduced in ER    Closed supracondylar fracture of left humerus- ortho notified by ED provider. Pain meds. Will need placement because unable to care for self. Uses walker at home.    Hyperlipidemia with target LDL less than 130- not on home meds    Chronic constipation- continue home colace dose. May need to add miralax since requiring opiate    Generalized anxiety disorder- continue home meds    Benign essential hypertension- continue home meds. Mildly hypertensive in ED but, secondary to pain and did not have meds yet today because of fall    Hypothyroidism, unspecified type- continue home synthroid (last tsh/t4 10/31/17, reviewed)    FEN: PIV              Code Status:   DNR / DNI- confirmed with pt. POLST available         Chief Complaint:     Fall and left shoulder pain         History of Present Illness:   The history is obtained from the patient.    Pt presented to the ED with a fall this morning.  She reports that she stood and walked to the bathroom and simply slipped and fell onto her left shoulder.  She did not hit her head and she had no dizziness or lightheadedness, as well as no chest pain or palpitations prior to falling.  No loss of consciousness.  EMS was called and she was found to have a dislocated shoulder which was reduced in the emergency department and a fracture of the left humerus.  She is now in a shoulder immobilizer and still has some pain but improved.  Patient uses a walker to ambulate and is unable to discharge and will require placement.  She reports that she is otherwise feeling well.           Past Medical History:      Patient Active Problem List   Diagnosis     Advance Care Planning     Hyperlipidemia with target LDL less than 130     Chronic constipation     OA (osteoarthritis) of knee     Generalized anxiety disorder     Major depressive disorder, single episode, mild (H)     Benign essential hypertension     Hypothyroidism, unspecified type     Shoulder dislocation, left, initial encounter     Closed supracondylar fracture of left humerus            Past Surgical History:     Past Surgical History:   Procedure Laterality Date     APPENDECTOMY  1947     BREAST BIOPSY, RT/LT      right - benign     CATARACT IOL, RT/LT      bilateral     partial thyroidectomy       VITRECTOMY,STRIP EPIRETINAL MEMBRANE  2002            Home Medications:     Prior to Admission medications    Medication Sig Last Dose Taking? Auth Provider   levothyroxine (SYNTHROID/LEVOTHROID) 25 MCG tablet TAKE 1 TABLET (25 MCG) BY MOUTH DAILY 2/2/2018 at 0600 Yes Alejandra Watson MD   losartan (COZAAR) 25 MG tablet TAKE 1 TABLET (25 MG) BY MOUTH 2 TIMES DAILY 2/2/2018 at 0800 Yes Alejandra Watson MD   amLODIPine (NORVASC) 5 MG tablet TAKE 1 TABLET BY MOUTH TWICE DAILY 2/3/2018 at 0800 Yes Alejandra Watson MD   brimonidine (ALPHAGAN) 0.2 % ophthalmic solution Place 1 drop Into the left eye 2 times daily 2/2/2018 at 2000 Yes Reported, Patient   metoprolol (TOPROL-XL) 25 MG 24 hr tablet Take 1 tablet (25 mg) by mouth 2 times daily 2/2/2018 at 0800 Yes Alejandra Watson MD   busPIRone (BUSPAR) 10 MG tablet Take 10 mg by mouth 2 times daily 0800, 1700 2/2/2018 at 0800 Yes Reported, Patient   VITAMIN D, CHOLECALCIFEROL, PO Take 2,000 Units by mouth daily At 1200 2/2/2018 at 1200 Yes Reported, Patient   DULoxetine (CYMBALTA) 60 MG EC capsule Take 60 mg by mouth daily 2/2/2018 at 0800 Yes Reported, Patient   mirtazapine (REMERON) 15 MG tablet Take 15 mg by mouth At Bedtime 2/2/2018 at hs Yes Reported, Patient   order for DME Oxygen 2 Li/min  at night 2/2/2018 at hs Yes  Noble Ojeda MD   latanoprost (XALATAN) 0.005 % ophthalmic solution Place 1 drop into both eyes At Bedtime 2018 at hs Yes Klaudia Hernandez MD   multivitamin  with lutein (OCUVITE WITH LTEIN) CAPS Take 1 capsule by mouth daily At 1200 2018 at 1200 Yes Reported, Patient   calcium-vitamin D (CALTRATE) 600-400 MG-UNIT per tablet Take 1 tablet by mouth 2 times daily At 1200, and 8 pm 2018 at 2000 Yes Reported, Patient   docusate sodium (COLACE) 100 MG capsule Take 100 mg by mouth 3 times daily 0800, 1200, 2000 2018 at 2000 Yes Reported, Patient   zolpidem (AMBIEN) 5 MG tablet Take 0.5 tablets (2.5 mg) by mouth nightly as needed for sleep Unknown at Unknown time  Alejandra Watson MD   aspirin-acetaminophen-caffeine (EXCEDRIN EXTRA STRENGTH) 250-250-65 MG per tablet Take 1 tablet by mouth every 6 hours as needed Unknown at Unknown time  Reported, Patient            Social History:     Social History   Substance Use Topics     Smoking status: Never Smoker     Smokeless tobacco: Never Used     Alcohol use No             Family History:     Family History   Problem Relation Age of Onset     C.A.D. Mother      Cancer - colorectal Father      Alzheimer Disease Brother      Neurologic Disorder Brother      parkinson     CANCER Daughter      lymphoma             Allergies:     Allergies   Allergen Reactions     Codeine Sulfate Unknown     Penicillins Unknown             Review of Systems:   The 10 point Review of Systems is negative other than noted in the HPI           Physical Exam:   Blood pressure (!) 167/91, pulse 78, temperature 98  F (36.7  C), temperature source Oral, resp. rate 18, weight 72.6 kg (160 lb), SpO2 92 %, not currently breastfeeding.  Vitals:    18 0935   Weight: 72.6 kg (160 lb)     Vital Sign Ranges  Temperature Temp  Av  F (36.7  C)  Min: 98  F (36.7  C)  Max: 98  F (36.7  C)   Blood pressure Systolic (24hrs), Av , Min:113 , Max:193        Diastolic  (24hrs), Av, Min:67, Max:110      Pulse Pulse  Av  Min: 78  Max: 78   Respirations Resp  Av  Min: 18  Max: 18   Pulse oximetry SpO2  Av.4 %  Min: 90 %  Max: 99 %         Constitutional: Awake, alert, cooperative, no apparent distress   Psych: Alert, oriented to person, place and time. Appears sleepy   Neuro: Cranial nerves 2-12 grossly intact, normal strength and sensation. 9not tested in left arm)   Eyes: Conjunctiva, pupils, lids examined and normal.   ENT: Moist mucous membranes, normal dentition.   Lymph: No anterior cervical or supraclavicular adenopathy.   Cardiovascular: Regular rate and rhythm, normal S1 and S2   Lungs: No respiratory distress. Clear to auscultation bilaterally, no crackles or wheezing   GI: soft, non-tender, non-distended, normal bowel sounds   Skin: No rashes, no cyanosis, no edema   Musculoskeletal: No joint swelling, erythema or tenderness. Left shoulder not examined (immobilized)  Hip and pelvis exam reveals no crepitus and normal range of motion for her age.  No bruising or redness noted.          Data:   All new lab and imaging data was reviewed.   Recent Labs   Lab Test  18   0950  10/31/17   1115   WBC  9.6  7.7   HGB  13.1  12.7   MCV  90  91   PLT  393  323      Recent Labs   Lab Test  18   0950  10/31/17   1115   NA  143  144   POTASSIUM  3.5  3.6   CHLORIDE  106  107   CO2  29  31   BUN  15  16   CR  0.78  0.99   ANIONGAP  8  6   EVA  8.4*  9.0   GLC  103*  82     Recent Labs   Lab Test  12/10/16   0410  12/10/16   0128   TROPI  <0.015  The 99th percentile for upper reference range is 0.045 ug/L.  Troponin values in   the range of 0.045 - 0.120 ug/L may be associated with risks of adverse   clinical events.    <0.015  The 99th percentile for upper reference range is 0.045 ug/L.  Troponin values in   the range of 0.045 - 0.120 ug/L may be associated with risks of adverse   clinical events.           Imaging:   HUMERUS LEFT TWO OR MORE VIEWS     2/3/2018 11:00 AM      HISTORY: Fall, pain.     COMPARISON: None.         IMPRESSION: Fracture dislocation of the glenohumeral joint is present  with a large Hill-Sachs deformity and some fracture fragments  projecting in the joint space. There is also smaller osseous densities  which could be due to loose bodies. The mid and distal humerus appear  Intact.    LEFT SHOULDER THREE VIEWS  2/3/2018 11:00 AM      HISTORY: Fall, pain.     COMPARISON: None.         IMPRESSION: There is an anteroinferior glenohumeral joint dislocation  with associated fracture of the humeral head with some fracture  fragments head. There are several fracture fragments projecting just  lateral to the glenoid.       XR SHOULDER LT PORT G/E 2 VW 2/3/2018 1:29 PM     COMPARISON: Radiographs earlier on the same day.     HISTORY: Postreduction.         IMPRESSION: Normal alignment of the left glenohumeral joint following  reduction. Multiple calcific fragments are seen adjacent to the  humeral head, may represent fractures related to the dislocation,  versus fractured osteophytes or joint bodies.      ECG- NSR without acute ST changes. poor rwave progression.       Assessment and Plan above.  Admission time: less than 60 minutes    Kiah Savage   2/3/2018

## 2018-02-03 NOTE — ANESTHESIA POSTPROCEDURE EVALUATION
Patient: Lara Wills    * No procedures listed *    Diagnosis:* No pre-op diagnosis entered *  Diagnosis Additional Information: No value filed.    Anesthesia Type:  MAC    Note:  Anesthesia Post Evaluation    Patient location during evaluation: Bedside  Patient participation: Able to fully participate in evaluation  Level of consciousness: awake  Pain management: adequate  Airway patency: patent  Cardiovascular status: acceptable  Respiratory status: acceptable  Hydration status: acceptable     Anesthetic complications: None          Last vitals:  Vitals:    02/03/18 1202 02/03/18 1212 02/03/18 1215   BP: (!) 171/91  (!) 181/100   Pulse:      Resp:      Temp:      SpO2: 94% 95% 93%         Electronically Signed By: ROSANGELA Pruitt CRNA  February 3, 2018  1:12 PM

## 2018-02-03 NOTE — PLAN OF CARE
Problem: Patient Care Overview  Goal: Plan of Care/Patient Progress Review  Skin:  Patient has a superficial abrasion on her left knee. Area is dry without redness. She has a large purple discoloration on the back of her right hand and another large purple discoloration on her anterior mid forearm.

## 2018-02-03 NOTE — IP AVS SNAPSHOT
"                  MRN:[id number]                      After Visit Summary   2/3/2018    Lara Wills    MRN: [id number]           Thank you!     Thank you for choosing [facility] for your care. Our goal is always to provide you with excellent care. Hearing back from our patients is one way we can continue to improve our services. Please take a few minutes to complete the written survey that you may receive in the mail after you visit with us. Thank you!        Patient Information     Date Of Birth          6/12/1922        Designated Caregiver       Most Recent Value    Caregiver    Will someone help with your care after discharge? yes    Name of designated caregiver Anh    Phone number of caregiver [phone]    Caregiver address \"[address] [place]\"      About your hospital stay     You were admitted on:  February 3, 2018 You last received care in the:  [facility]    You were discharged on:  February 6, 2018        Reason for your hospital stay       You were in the hospital following fracture and dislocation of your left shoulder and arm.  You're recovering from that now, and at this point will benefit from going to a Transitional Care Unit to continue your rehabilitation from that injury.                  Who to Call     For medical emergencies, please call 911.  For non-urgent questions about your medical care, please call your primary care provider or clinic, [phone]          Attending Provider     Provider Specialty    Kenn Ayon MD Emergency Medicine    [facility] Health[facility] Osmar MASON MD Internal Medicine    [place], Kiah Portillo MD [facility], Fabrizio Winchester MD Pulmonary       Primary Care Provider Office Phone # Fax #    Alejandra Watson [phone]      After Care Instructions     Activity - Up with nursing assistance           Advance Diet as Tolerated       Follow this diet upon discharge:      " Advance Diet as Tolerated: Regular Diet Adult            General info for SNF       Length of Stay Estimate: Short Term Care: Estimated # of Days <30  Condition at Discharge: Improving  Level of care:skilled   Rehabilitation Potential: Good  Admission H&P remains valid and up-to-date: Yes  Recent Chemotherapy: N/A  Use Nursing Home Standing Orders: Yes            Mantoux instructions       Give two-step Mantoux (PPD) Per Facility Policy Yes            Oxygen - Nasal cannula       Daytime use is PRN use of 2 L/min by nasal cannula to keep O2 sats 90% or greater.  Nocturnal use is 2 L/min with sleep every night.                  Additional Services     Occupational Therapy Adult Consult       Evaluate and treat as clinically indicated.    Reason:  S/P left humerus fracture and shoulder dislocation.  Is globally weak following hospitalization.            Physical Therapy Adult Consult       Evaluate and treat as clinically indicated.    Reason:  S/P left humerus fracture and shoulder dislocation.  Is globally weak following hospitalization.                  Further instructions from your care team       Keep left arm in immobilizer. NWB left extremity.    Pending Results     No orders found from 2/1/2018 to 2/4/2018.            Statement of Approval     Ordered          02/06/18 1117  I have reviewed and agree with all the recommendations and orders detailed in this document.  EFFECTIVE NOW     Approved and electronically signed by:  Fabrizio Elder MD             Admission Information     Date & Time Provider Department Dept. Phone    2/3/2018 Fabrizio Elder MD [facility] Surgical [phone]      Your Vitals Were     Blood Pressure Pulse Temperature Respirations Weight Pulse Oximetry    156/78 85 97.6  F (36.4  C) (Oral) 18 72.6 kg (160 lb) 92%    BMI (Body Mass Index)                   32.3 kg/m2           MyChart Information     [employer] gives you secure access to your electronic health  record. If you see a primary care provider, you can also send messages to your care team and make appointments. If you have questions, please call your primary care clinic.  If you do not have a primary care provider, please call [phone] and they will assist you.        Care EveryWhere ID     This is your Care EveryWhere ID. This could be used by other organizations to access your [facility] medical records  [id number]        Equal Access to Services     ABI SARAH : Hadguillermo yoder Soolman, waaxda luantonietaadaha, qaybta kaalmada mali, patric joncjclaudia amadro. So [facility] [phone].    ATENCIÓN: Si sadafla kit, tiene a ro disposición servicios gratuitos de asistencia lingüística. Tra al [phone].    We comply with applicable federal civil rights laws and [place] laws. We do not discriminate on the basis of race, color, national origin, age, disability, sex, sexual orientation, or gender identity.               Review of your medicines      START taking        Dose / Directions    acetaminophen 325 MG tablet   Commonly known as:  TYLENOL        Dose:  975 mg   Take 3 tablets (975 mg) by mouth every 8 hours   Quantity:  100 tablet   Refills:  3       ibuprofen 600 MG tablet   Commonly known as:  ADVIL/MOTRIN        Dose:  600 mg   Take 1 tablet (600 mg) by mouth every 6 hours as needed for moderate pain   Quantity:  120 tablet   Refills:  3       oxyCODONE IR 5 MG tablet   Commonly known as:  ROXICODONE        Dose:  2.5-5 mg   Take 0.5-1 tablets (2.5-5 mg) by mouth every 4 hours as needed for moderate to severe pain   Quantity:  18 tablet   Refills:  0         CONTINUE these medicines which have NOT CHANGED        Dose / Directions    AMBIEN 5 MG tablet   Used for:  Generalized anxiety disorder   Generic drug:  zolpidem        Dose:  2.5 mg   Take 0.5 tablets (2.5 mg) by mouth nightly as needed for sleep   Quantity:  30 tablet   Refills:  0       amLODIPine 5 MG tablet    Commonly known as:  NORVASC   Used for:  Benign essential hypertension        TAKE 1 TABLET BY MOUTH TWICE DAILY   Quantity:  180 tablet   Refills:  1       brimonidine 0.2 % ophthalmic solution   Commonly known as:  ALPHAGAN        Dose:  1 drop   Place 1 drop Into the left eye 2 times daily   Refills:  0       busPIRone 10 MG tablet   Commonly known as:  BUSPAR        Dose:  10 mg   Take 10 mg by mouth 2 times daily 0800, 1700   Refills:  0       calcium-vitamin D 600-400 MG-UNIT per tablet   Commonly known as:  CALTRATE        Dose:  1 tablet   Take 1 tablet by mouth 2 times daily At 1200, and 8 pm   Refills:  0       docusate sodium 100 MG capsule   Commonly known as:  COLACE        Dose:  100 mg   Take 100 mg by mouth 3 times daily 0800, 1200, 2000   Refills:  0       DULoxetine 60 MG EC capsule   Commonly known as:  CYMBALTA   Indication:  Generalized Anxiety Disorder, Major Depressive Disorder        Dose:  60 mg   Take 60 mg by mouth daily   Refills:  0       latanoprost 0.005 % ophthalmic solution   Commonly known as:  XALATAN        Dose:  1 drop   Place 1 drop into both eyes At Bedtime   Quantity:  1 Bottle   Refills:  4       levothyroxine 25 MCG tablet   Commonly known as:  SYNTHROID/LEVOTHROID   Used for:  Hypothyroidism, unspecified type        TAKE 1 TABLET (25 MCG) BY MOUTH DAILY   Quantity:  90 tablet   Refills:  1       losartan 25 MG tablet   Commonly known as:  COZAAR   Used for:  Benign essential hypertension        TAKE 1 TABLET (25 MG) BY MOUTH 2 TIMES DAILY   Quantity:  180 tablet   Refills:  1       metoprolol succinate 25 MG 24 hr tablet   Commonly known as:  TOPROL-XL   Used for:  Benign essential hypertension        Dose:  25 mg   Take 1 tablet (25 mg) by mouth 2 times daily   Quantity:  180 tablet   Refills:  3       multivitamin  with lutein Caps per capsule        Dose:  1 capsule   Take 1 capsule by mouth daily At 1200   Refills:  0       order for DME   Used for:  Other secondary  hypertension        Oxygen 2 Li/min at night   Refills:  0       REMERON 15 MG tablet   Indication:  Trouble Sleeping   Generic drug:  mirtazapine        Dose:  15 mg   Take 15 mg by mouth At Bedtime   Refills:  0       VITAMIN D (CHOLECALCIFEROL) PO        Dose:  2000 Units   Take 2,000 Units by mouth daily At 1200   Refills:  0         STOP taking     EXCEDRIN EXTRA STRENGTH 250-250-65 MG per tablet   Generic drug:  aspirin-acetaminophen-caffeine                Where to get your medicines      Some of these will need a paper prescription and others can be bought over the counter. Ask your nurse if you have questions.     Bring a paper prescription for each of these medications     oxyCODONE IR 5 MG tablet       You don't need a prescription for these medications     acetaminophen 325 MG tablet    ibuprofen 600 MG tablet                Protect others around you: Learn how to safely use, store and throw away your medicines at www.disposemymeds.org.        Information about OPIOIDS     PRESCRIPTION OPIOIDS: WHAT YOU NEED TO KNOW    Prescription opioids can be used to help relieve moderate to severe pain and are often prescribed following a surgery or injury, or for certain health conditions. These medications can be an important part of treatment but also come with serious risks. It is important to work with your health care provider to make sure you are getting the safest, most effective care.    WHAT ARE THE RISKS AND SIDE EFFECTS OF OPIOID USE?  Prescription opioids carry serious risks of addiction and overdose, especially with prolonged use. An opioid overdose, often marked by slowed breathing can cause sudden death. The use of prescription opioids can have a number of side effects as well, even when taken as directed:      Tolerance - meaning you might need to take more of a medication for the same pain relief    Physical dependence - meaning you have symptoms of withdrawal when a medication is  stopped    Increased sensitivity to pain    Constipation    Nausea, vomiting, and dry mouth    Sleepiness and dizziness    Confusion    Depression    Low levels of testosterone that can result in lower sex drive, energy, and strength    Itching and sweating    RISKS ARE GREATER WITH:    History of drug misuse, substance use disorder, or overdose    Mental health conditions (such as depression or anxiety)    Sleep apnea    Older age (65 years or older)    Pregnancy    Avoid alcohol while taking prescription opioids.   Also, unless specifically advised by your health care provider, medications to avoid include:    Benzodiazepines (such as Xanax or Valium)    Muscle relaxants (such as Soma or Flexeril)    Hypnotics (such as Ambien or Lunesta)    Other prescription opioids    KNOW YOUR OPTIONS:  Talk to your health care provider about ways to manage your pain that do not involve prescription opioids. Some of these options may actually work better and have fewer risks and side effects:    Pain relievers such as acetaminophen, ibuprofen, and naproxen    Some medications that are also used for depression or seizures    Physical therapy and exercise    Cognitive behavioral therapy, a psychological, goal-directed approach, in which patients learn how to modify physical, behavioral, and emotional triggers of pain and stress    IF YOU ARE PRESCRIBED OPIOIDS FOR PAIN:    Never take opioids in greater amounts or more often than prescribed    Follow up with your primary health care provider and work together to create a plan on how to manage your pain.    Talk about ways to help manage your pain that do not involve prescription opioids    Talk about all concerns and side effects    Help prevent misuse and abuse    Never sell or share prescription opioids    Never use another person's prescription opioids    Store prescription opioids in a secure place and out of reach of others (this may include visitors, children, friends, and  family)    Visit www.cdc.gov/drugoverdose to learn about risks of opioid abuse and overdose    If you believe you may be struggling with addiction, tell your health care provider and ask for guidance or call [facility]'s National Helpline at 9-207-567-HELP    LEARN MORE / www.cdc.gov/drugoverdose/prescribing/guideline.html    Safely dispose of unused prescription opioids: Find your local drug take-back programs and more information about the importance of safe disposal at www.doseofreality.[place].gov             Medication List: This is a list of all your medications and when to take them. Check marks below indicate your daily home schedule. Keep this list as a reference.      Medications           Morning Afternoon Evening Bedtime As Needed    acetaminophen 325 MG tablet   Commonly known as:  TYLENOL   Take 3 tablets (975 mg) by mouth every 8 hours   Last time this was given:  975 mg on 2/6/2018  8:41 AM                                AMBIEN 5 MG tablet   Take 0.5 tablets (2.5 mg) by mouth nightly as needed for sleep   Generic drug:  zolpidem                                amLODIPine 5 MG tablet   Commonly known as:  NORVASC   TAKE 1 TABLET BY MOUTH TWICE DAILY   Last time this was given:  5 mg on 2/6/2018  8:41 AM                                brimonidine 0.2 % ophthalmic solution   Commonly known as:  ALPHAGAN   Place 1 drop Into the left eye 2 times daily   Last time this was given:  1 drop on 2/6/2018  8:45 AM                                busPIRone 10 MG tablet   Commonly known as:  BUSPAR   Take 10 mg by mouth 2 times daily 0800, 1700   Last time this was given:  10 mg on 2/6/2018  8:44 AM                                calcium-vitamin D 600-400 MG-UNIT per tablet   Commonly known as:  CALTRATE   Take 1 tablet by mouth 2 times daily At 1200, and 8 pm                                docusate sodium 100 MG capsule   Commonly known as:  COLACE   Take 100 mg by mouth 3 times daily 0800, 1200, 2000   Last time this was  given:  100 mg on 2/6/2018  8:41 AM                                DULoxetine 60 MG EC capsule   Commonly known as:  CYMBALTA   Take 60 mg by mouth daily   Last time this was given:  60 mg on 2/6/2018  8:41 AM                                ibuprofen 600 MG tablet   Commonly known as:  ADVIL/MOTRIN   Take 1 tablet (600 mg) by mouth every 6 hours as needed for moderate pain                                latanoprost 0.005 % ophthalmic solution   Commonly known as:  XALATAN   Place 1 drop into both eyes At Bedtime   Last time this was given:  1 drop on 2/5/2018  8:29 PM                                levothyroxine 25 MCG tablet   Commonly known as:  SYNTHROID/LEVOTHROID   TAKE 1 TABLET (25 MCG) BY MOUTH DAILY   Last time this was given:  25 mcg on 2/6/2018  6:58 AM                                losartan 25 MG tablet   Commonly known as:  COZAAR   TAKE 1 TABLET (25 MG) BY MOUTH 2 TIMES DAILY   Last time this was given:  50 mg on 2/6/2018  8:41 AM                                metoprolol succinate 25 MG 24 hr tablet   Commonly known as:  TOPROL-XL   Take 1 tablet (25 mg) by mouth 2 times daily   Last time this was given:  25 mg on 2/6/2018  8:41 AM                                multivitamin  with lutein Caps per capsule   Take 1 capsule by mouth daily At 1200                                order for DME   Oxygen 2 Li/min at night                                oxyCODONE IR 5 MG tablet   Commonly known as:  ROXICODONE   Take 0.5-1 tablets (2.5-5 mg) by mouth every 4 hours as needed for moderate to severe pain   Last time this was given:  2.5 mg on 2/4/2018  1:19 PM                                REMERON 15 MG tablet   Take 15 mg by mouth At Bedtime   Last time this was given:  15 mg on 2/5/2018  8:26 PM   Generic drug:  mirtazapine                                VITAMIN D (CHOLECALCIFEROL) PO   Take 2,000 Units by mouth daily At 1200   Last time this was given:  2,000 Units on 2/6/2018  8:41 AM

## 2018-02-03 NOTE — IP AVS SNAPSHOT
"          [facility]: [phone]                                              INTERAGENCY TRANSFER FORM - LAB / IMAGING / EKG / EMG RESULTS   2/3/2018                    Hospital Admission Date: 2/3/2018  MAVIS SHARP   : 1922  Sex: Female        Attending Provider: Fabrizio Elder MD     Allergies:  Codeine Sulfate, Penicillins    Infection:  None   Service:  GENERAL MEDI    Ht:  1.499 m (4' 11.02\")   Wt:  72.6 kg (160 lb)   Admission Wt:  72.6 kg (160 lb)    BMI:  32.3 kg/m 2   BSA:  1.74 m 2            Patient PCP Information     Provider PCP Type    Alejandra Watson MD General         Lab Results - 3 Days      UA with Microscopic reflex to Culture [[id number]] (Abnormal)  Resulted: 18 1129, Result status: Final result    Ordering provider: Fabrizio Elder MD  18 1034 Resulting lab: [facility]    Specimen Information    Type Source Collected On   Midstream Urine Urine clean catch 18 1118          Components       Value Reference Range Flag Lab   Color Urine Light Yellow   59   Appearance Urine Clear   59   Glucose Urine Negative NEG^Negative mg/dL  59   Bilirubin Urine Negative NEG^Negative  59   Ketones Urine Negative NEG^Negative mg/dL  59   Specific Gravity Urine 1.004 1.003 - 1.035  59   Blood Urine Negative NEG^Negative  59   pH Urine 6.5 5.0 - 7.0 pH  59   Protein Albumin Urine Negative NEG^Negative mg/dL  59   Urobilinogen mg/dL Normal 0.0 - 2.0 mg/dL  59   Nitrite Urine Negative NEG^Negative  59   Leukocyte Esterase Urine Trace NEG^Negative A 59   Source Midstream Urine   59   WBC Urine 1 0 - 2 /HPF  59   RBC Urine 1 0 - 2 /HPF  59   Squamous Epithelial /HPF Urine <1 0 - 1 /HPF  59   Mucous Urine Present NEG^Negative /LPF A 59            UA with Microscopic reflex to Culture [[id number]] (Abnormal)  Resulted: 18 1156, Result status: Final result    Ordering provider: Kenn Ayon MD  18 1008 Resulting lab: " [facility]    Specimen Information    Type Source Collected On   Catheterized Urine Urine catheter 02/03/18 1100          Components       Value Reference Range Flag Lab   Color Urine Straw   59   Appearance Urine Clear   59   Glucose Urine Negative NEG^Negative mg/dL  59   Bilirubin Urine Negative NEG^Negative  59   Ketones Urine Negative NEG^Negative mg/dL  59   Specific Gravity Urine 1.006 1.003 - 1.035  59   Blood Urine Negative NEG^Negative  59   pH Urine 7.5 5.0 - 7.0 pH H 59   Protein Albumin Urine Negative NEG^Negative mg/dL  59   Urobilinogen mg/dL Normal 0.0 - 2.0 mg/dL  59   Nitrite Urine Negative NEG^Negative  59   Leukocyte Esterase Urine Negative NEG^Negative  59   Source Catheterized Urine   59   WBC Urine 0 0 - 2 /HPF  59   RBC Urine 1 0 - 2 /HPF  59   Mucous Urine Present NEG^Negative /LPF A 59            CBC with platelets differential [[id number]]  Resulted: 02/03/18 1047, Result status: Final result    Ordering provider: Kenn Ayon MD  02/03/18 1008 Resulting lab: [facility]    Specimen Information    Type Source Collected On   Blood  02/03/18 0950          Components       Value Reference Range Flag Lab   WBC 9.6 4.0 - 11.0 10e9/L  59   RBC Count 4.39 3.8 - 5.2 10e12/L  59   Hemoglobin 13.1 11.7 - 15.7 g/dL  59   Hematocrit 39.5 35.0 - 47.0 %  59   MCV 90 78 - 100 fl  59   MCH 29.8 26.5 - 33.0 pg  59   MCHC 33.2 31.5 - 36.5 g/dL  59   RDW 12.9 10.0 - 15.0 %  59   Platelet Count 393 150 - 450 10e9/L  59   Diff Method Automated Method   59   % Neutrophils 72.4 %  59   % Lymphocytes 16.3 %  59   % Monocytes 8.2 %  59   % Eosinophils 1.8 %  59   % Basophils 1.0 %  59   % Immature Granulocytes 0.3 %  59   Absolute Neutrophil 7.0 1.6 - 8.3 10e9/L  59   Absolute Lymphocytes 1.6 0.8 - 5.3 10e9/L  59   Absolute Monocytes 0.8 0.0 - 1.3 10e9/L  59   Absolute Eosinophils 0.2 0.0 - 0.7 10e9/L  59   Absolute Basophils 0.1 0.0 - 0.2 10e9/L  59   Abs Immature  Granulocytes 0.0 0 - 0.4 10e9/L  59            Basic metabolic panel [[id number]] (Abnormal)  Resulted: 02/03/18 1039, Result status: Final result    Ordering provider: Kenn Ayon MD  02/03/18 1008 Resulting lab: [facility]    Specimen Information    Type Source Collected On   Blood  02/03/18 0950          Components       Value Reference Range Flag Lab   Sodium 143 133 - 144 mmol/L  59   Potassium 3.5 3.4 - 5.3 mmol/L  59   Chloride 106 94 - 109 mmol/L  59   Carbon Dioxide 29 20 - 32 mmol/L  59   Anion Gap 8 3 - 14 mmol/L  59   Glucose 103 70 - 99 mg/dL H 59   Urea Nitrogen 15 7 - 30 mg/dL  59   Creatinine 0.78 0.52 - 1.04 mg/dL  59   GFR Estimate 68 >60 mL/min/1.7m2  59   Comment:  Non  GFR Calc   GFR Estimate If Black 83 >60 mL/min/1.7m2  59   Comment:  African American GFR Calc   Calcium 8.4 8.5 - 10.1 mg/dL L 59            Testing Performed By     Lab - Abbreviation Name Director Address Valid Date Range    59 - Unknown [facility] Unknown [address] [facility] [address] 12/31/14 1006 - Present            Unresulted Labs     None         Imaging Results - 3 Days      XR Chest Port 1 View [[id number]]  Resulted: 02/06/18 0806, Result status: Final result    Ordering provider: Fabrizio Elder MD  02/06/18 0005 Resulted by: Aranza Phillips MD    Performed: 02/06/18 0623 - 02/06/18 0630 Resulting lab: RADIOLOGY RESULTS    Narrative:       CHEST PORTABLE ONE VIEW February 6, 2018 6:30 AM     HISTORY: Evaluate causes of hypoxemia.     COMPARISON: 12/4/2013.      Impression:       IMPRESSION: There is mild stable enlargement of the cardiac  silhouette. There is diffuse interstitial prominence. Atelectasis or  scarring in the lung bases. No consolidative infiltrates. Old fracture  deformity of the left humeral head.    ARANZA PHILLIPS MD      XR Shoulder Left Port G/E 2 Views [[id number]]  Resulted: 02/03/18 1335, Result status: Final result     Ordering provider: Kenn Ayon MD  02/03/18 1310 Resulted by: Gosia Bustos MD    Performed: 02/03/18 1318 - 02/03/18 1329 Resulting lab: RADIOLOGY RESULTS    Narrative:       XR SHOULDER LT PORT G/E 2 VW 2/3/2018 1:29 PM    COMPARISON: Radiographs earlier on the same day.    HISTORY: Postreduction.      Impression:       IMPRESSION: Normal alignment of the left glenohumeral joint following  reduction. Multiple calcific fragments are seen adjacent to the  humeral head, may represent fractures related to the dislocation,  versus fractured osteophytes or joint bodies.    GOSIA BUSTOS MD      Shoulder XR, 2 view left [[id number]]  Resulted: 02/03/18 1110, Result status: Final result    Ordering provider: Kenn Ayon MD  02/03/18 1008 Resulted by: Iván Rose MD    Performed: 02/03/18 1044 - 02/03/18 1100 Resulting lab: RADIOLOGY RESULTS    Narrative:       LEFT SHOULDER THREE VIEWS  2/3/2018 11:00 AM     HISTORY: Fall, pain.    COMPARISON: None.      Impression:       IMPRESSION: There is an anteroinferior glenohumeral joint dislocation  with associated fracture of the humeral head with some fracture  fragments head. There are several fracture fragments projecting just  lateral to the glenoid.     IVÁN ROSE MD      XR Humerus Left G/E 2 Views [[id number]]  Resulted: 02/03/18 1110, Result status: Final result    Ordering provider: Kenn Ayon MD  02/03/18 1008 Resulted by: Iván Rose MD    Performed: 02/03/18 1045 - 02/03/18 1100 Resulting lab: RADIOLOGY RESULTS    Narrative:       HUMERUS LEFT TWO OR MORE VIEWS    2/3/2018 11:00 AM     HISTORY: Fall, pain.    COMPARISON: None.      Impression:       IMPRESSION: Fracture dislocation of the glenohumeral joint is present  with a large Hill-Sachs deformity and some fracture fragments  projecting in the joint space. There is also smaller osseous densities  which could be due to loose bodies. The mid and  distal humerus appear  intact.    CHET ROSE MD      Testing Performed By     Lab - Abbreviation Name Director Address Valid Date Range    104 - Rad Rslts RADIOLOGY RESULTS Unknown Unknown 02/16/05 1553 - Present            Encounter-Level Documents:     There are no encounter-level documents.      Order-Level Documents:     There are no order-level documents.

## 2018-02-03 NOTE — ED NOTES
Arianna Vallecillo RN attempted to get a TCU bed at [facility], there are none available.  Family requested we call [facility] on [address], there was no answer.   MD and family updated on the plan to admit to observation.

## 2018-02-03 NOTE — ANESTHESIA PREPROCEDURE EVALUATION
Anesthesia Evaluation     . Pt has had prior anesthetic. Type: General    No history of anesthetic complications          ROS/MED HX    ENT/Pulmonary:     (+), . Other pulmonary disease O2 dependany at night.    Neurologic:       Cardiovascular:     (+) hypertension----. : . . . :. .       METS/Exercise Tolerance:     Hematologic:  - neg hematologic  ROS       Musculoskeletal:  - neg musculoskeletal ROS       GI/Hepatic:  - neg GI/hepatic ROS       Renal/Genitourinary:         Endo:     (+) thyroid problem hypothyroidism, .      Psychiatric:     (+) psychiatric history depression      Infectious Disease:  - neg infectious disease ROS       Malignancy:      - no malignancy   Other:    - neg other ROS                 Physical Exam  Normal systems: cardiovascular and pulmonary    Airway   Mallampati: II  TM distance: >3 FB  Neck ROM: full    Dental   (+) partials    Cardiovascular       Pulmonary                     Anesthesia Plan      History & Physical Review  History and physical reviewed and following examination; no interval change.    ASA Status:  3 .    NPO Status:  > 8 hours    Plan for MAC Reason for MAC:  Deep or markedly invasive procedure (G8)         Postoperative Care      Consents  Anesthetic plan, risks, benefits and alternatives discussed with:  Patient..                          .

## 2018-02-03 NOTE — IP AVS SNAPSHOT
"    [facility] SURGICAL: [phone]                                              INTERAGENCY TRANSFER FORM - PHYSICIAN ORDERS   2/3/2018                    Hospital Admission Date: 2/3/2018  MAVIS SHARP   : 1922  Sex: Female        Attending Provider: Fabrizio Elder MD     Allergies:  Codeine Sulfate, Penicillins    Infection:  None   Service:  GENERAL MEDI    Ht:  1.499 m (4' 11.02\")   Wt:  72.6 kg (160 lb)   Admission Wt:  72.6 kg (160 lb)    BMI:  32.3 kg/m 2   BSA:  1.74 m 2            Patient PCP Information     Provider PCP Type    Alejandra Watson MD General      ED Clinical Impression     Diagnosis Description Comment Added By Time Added    Fall, initial encounter [W19.XXXA] Fall, initial encounter [W19.XXXA]  Kenn Ayon MD 2/3/2018  2:10 PM    Shoulder dislocation, left, initial encounter [S43.005A] Shoulder dislocation, left, initial encounter [S43.005A] Reduced in the emergency department. Kenn Ayon MD 2/3/2018  2:11 PM      Hospital Problems as of 2018              Priority Class Noted POA    Hyperlipidemia with target LDL less than 130 Medium  2013 Yes    Chronic constipation Medium  2013 Yes    Generalized anxiety disorder Medium  2013 Yes    Benign essential hypertension Medium  2016 Yes    Hypothyroidism, unspecified type Medium  2016 Yes    Shoulder dislocation, left, initial encounter Medium  2/3/2018 Yes    * (Principal)Closed supracondylar fracture of left humerus Medium  2/3/2018 Yes    Fall Medium  2/3/2018 Yes    Delirium Medium  2018 No    Hypoxemia Medium  2018 Yes    Interstitial fibrosis (H) Medium  2018 Yes      Non-Hospital Problems as of 2018              Priority Class Noted    Advance Care Planning Medium  2013    OA (osteoarthritis) of knee Medium  2013    Major depressive disorder, single episode, mild (H) Medium  2016      Code Status History     Date Active Date " Inactive Code Status Order ID Comments User Context    2/6/2018 11:16 AM  DNR/DNI [id number]  Fabrizio Elder MD Outpatient    2/4/2018  3:00 PM 2/6/2018 11:16 AM DNR/DNI [id number]  Kiah Savage MD Inpatient         Medication Review      START taking        Dose / Directions Comments    acetaminophen 325 MG tablet   Commonly known as:  TYLENOL        Dose:  975 mg   Take 3 tablets (975 mg) by mouth every 8 hours   Quantity:  100 tablet   Refills:  3        ibuprofen 600 MG tablet   Commonly known as:  ADVIL/MOTRIN        Dose:  600 mg   Take 1 tablet (600 mg) by mouth every 6 hours as needed for moderate pain   Quantity:  120 tablet   Refills:  3        oxyCODONE IR 5 MG tablet   Commonly known as:  ROXICODONE        Dose:  2.5-5 mg   Take 0.5-1 tablets (2.5-5 mg) by mouth every 4 hours as needed for moderate to severe pain   Quantity:  18 tablet   Refills:  0          CONTINUE these medications which have NOT CHANGED        Dose / Directions Comments    AMBIEN 5 MG tablet   Used for:  Generalized anxiety disorder   Generic drug:  zolpidem        Dose:  2.5 mg   Take 0.5 tablets (2.5 mg) by mouth nightly as needed for sleep   Quantity:  30 tablet   Refills:  0    Order changed from nightly to PRN nightly per pt request.       amLODIPine 5 MG tablet   Commonly known as:  NORVASC   Used for:  Benign essential hypertension        TAKE 1 TABLET BY MOUTH TWICE DAILY   Quantity:  180 tablet   Refills:  1        brimonidine 0.2 % ophthalmic solution   Commonly known as:  ALPHAGAN        Dose:  1 drop   Place 1 drop Into the left eye 2 times daily   Refills:  0        busPIRone 10 MG tablet   Commonly known as:  BUSPAR        Dose:  10 mg   Take 10 mg by mouth 2 times daily 0800, 1700   Refills:  0        calcium-vitamin D 600-400 MG-UNIT per tablet   Commonly known as:  CALTRATE        Dose:  1 tablet   Take 1 tablet by mouth 2 times daily At 1200, and 8 pm   Refills:  0        docusate sodium 100 MG  capsule   Commonly known as:  COLACE        Dose:  100 mg   Take 100 mg by mouth 3 times daily 0800, 1200, 2000   Refills:  0        DULoxetine 60 MG EC capsule   Commonly known as:  CYMBALTA   Indication:  Generalized Anxiety Disorder, Major Depressive Disorder        Dose:  60 mg   Take 60 mg by mouth daily   Refills:  0        latanoprost 0.005 % ophthalmic solution   Commonly known as:  XALATAN        Dose:  1 drop   Place 1 drop into both eyes At Bedtime   Quantity:  1 Bottle   Refills:  4        levothyroxine 25 MCG tablet   Commonly known as:  SYNTHROID/LEVOTHROID   Used for:  Hypothyroidism, unspecified type        TAKE 1 TABLET (25 MCG) BY MOUTH DAILY   Quantity:  90 tablet   Refills:  1        losartan 25 MG tablet   Commonly known as:  COZAAR   Used for:  Benign essential hypertension        TAKE 1 TABLET (25 MG) BY MOUTH 2 TIMES DAILY   Quantity:  180 tablet   Refills:  1        metoprolol succinate 25 MG 24 hr tablet   Commonly known as:  TOPROL-XL   Used for:  Benign essential hypertension        Dose:  25 mg   Take 1 tablet (25 mg) by mouth 2 times daily   Quantity:  180 tablet   Refills:  3    DISCONTINUE order for the 25 mg daily metoprolol xl.       multivitamin  with lutein Caps per capsule        Dose:  1 capsule   Take 1 capsule by mouth daily At 1200   Refills:  0        order for DME   Used for:  Other secondary hypertension        Oxygen 2 Li/min at night   Refills:  0        REMERON 15 MG tablet   Indication:  Trouble Sleeping   Generic drug:  mirtazapine        Dose:  15 mg   Take 15 mg by mouth At Bedtime   Refills:  0        VITAMIN D (CHOLECALCIFEROL) PO        Dose:  2000 Units   Take 2,000 Units by mouth daily At 1200   Refills:  0          STOP taking     EXCEDRIN EXTRA STRENGTH 250-250-65 MG per tablet   Generic drug:  aspirin-acetaminophen-caffeine                     Further instructions from your care team       Keep left arm in immobilizer. NWB left extremity.    Summary of Visit      Reason for your hospital stay       You were in the hospital following fracture and dislocation of your left shoulder and arm.  You're recovering from that now, and at this point will benefit from going to a Transitional Care Unit to continue your rehabilitation from that injury.             After Care     Activity - Up with nursing assistance           Advance Diet as Tolerated       Follow this diet upon discharge:      Advance Diet as Tolerated: Regular Diet Adult       General info for SNF       Length of Stay Estimate: Short Term Care: Estimated # of Days <30  Condition at Discharge: Improving  Level of care:skilled   Rehabilitation Potential: Good  Admission H&P remains valid and up-to-date: Yes  Recent Chemotherapy: N/A  Use Nursing Home Standing Orders: Yes       Mantoux instructions       Give two-step Mantoux (PPD) Per Facility Policy Yes             Procedures     Oxygen - Nasal cannula       Daytime use is PRN use of 2 L/min by nasal cannula to keep O2 sats 90% or greater.  Nocturnal use is 2 L/min with sleep every night.             Referrals     Occupational Therapy Adult Consult       Evaluate and treat as clinically indicated.    Reason:  S/P left humerus fracture and shoulder dislocation.  Is globally weak following hospitalization.       Physical Therapy Adult Consult       Evaluate and treat as clinically indicated.    Reason:  S/P left humerus fracture and shoulder dislocation.  Is globally weak following hospitalization.             Statement of Approval     Ordered          02/06/18 1117  I have reviewed and agree with all the recommendations and orders detailed in this document.  EFFECTIVE NOW     Approved and electronically signed by:  Fabrizio Elder MD

## 2018-02-03 NOTE — IP AVS SNAPSHOT
` `     [facility] SURGICAL: [phone]            Medication Administration Report for Lara Wills as of 02/06/18 1307   Legend:    Given Hold Not Given Due Canceled Entry Other Actions    Time Time (Time) Time  Time-Action       Inactive    Active    Linked        Medications 01/31/18 02/01/18 02/02/18 02/03/18 02/04/18 02/05/18 02/06/18    acetaminophen (TYLENOL) tablet 975 mg  Dose: 975 mg  Freq: EVERY 8 HOURS Route: PO  Start: 02/04/18 1601   Admin Instructions: Maximum acetaminophen dose from all sources = 75 mg/kg/day not to exceed 4 grams/day.         1732 (975 mg)-Given        0009 (975 mg)-Given       0935 (975 mg)-Given       1611 (975 mg)-Given        0026 (975 mg)-Given       0841 (975 mg)-Given       [ ] 1615           amLODIPine (NORVASC) tablet 5 mg  Dose: 5 mg  Freq: 2 TIMES DAILY Route: PO  Start: 02/03/18 2000       2108 (5 mg)-Given        0803 (5 mg)-Given       2044 (5 mg)-Given        0938 (5 mg)-Given       2024 (5 mg)-Given        0841 (5 mg)-Given       [ ] 2000           brimonidine (ALPHAGAN) 0.2 % ophthalmic solution 1 drop  Dose: 1 drop  Freq: 2 TIMES DAILY Route: LEFT EYE  Start: 02/03/18 2000       2108 (1 drop)-Given        0810 (1 drop)-Given       2046 (1 drop)-Given        0943 (1 drop)-Given       2024 (1 drop)-Given        0845 (1 drop)-Given       [ ] 2000           busPIRone (BUSPAR) tablet 10 mg  Dose: 10 mg  Freq: 2 TIMES DAILY Route: PO  Start: 02/03/18 2000       2108 (10 mg)-Given        0809 (10 mg)-Given       2042 (10 mg)-Given        0937 (10 mg)-Given       2026 (10 mg)-Given        0844 (10 mg)-Given       [ ] 2000           Calcium carb-Vitamin D 500 mg [place]-200 units (OSCAL with D;Oyster Shell Calcium) per tablet 1 tablet  Dose: 1 tablet  Freq: 2 TIMES DAILY WITH MEALS Route: PO  Start: 02/03/18 1830   Admin Instructions: Formulary sub while at [place]        1840 (1 tablet)-Given        0803 (1 tablet)-Given       1732 (1 tablet)-Given        0971  (1 tablet)-Given       1805 (1 tablet)-Given        0841 (1 tablet)-Given       [ ] 1730           cholecalciferol (vitamin D3) tablet 2,000 Units  Dose: 2,000 Units  Freq: DAILY Route: PO  Start: 02/03/18 1700       1840 (2,000 Units)-Given        0802 (2,000 Units)-Given        0943 (2,000 Units)-Given        0841 (2,000 Units)-Given           docusate sodium (COLACE) capsule 100 mg  Dose: 100 mg  Freq: 3 TIMES DAILY Route: PO  Start: 02/03/18 2000       2108 (100 mg)-Given        0805 (100 mg)-Given       1319 (100 mg)-Given       2043 (100 mg)-Given        0940 (100 mg)-Given       1611 (100 mg)-Given       2026 (100 mg)-Given        0841 (100 mg)-Given       [ ] 1400       [ ] 2000           DULoxetine (CYMBALTA) EC capsule 60 mg  Dose: 60 mg  Freq: DAILY Route: PO  Indications of Use: GENERALIZED ANXIETY DISORDER,MAJOR DEPRESSIVE DISORDER  Start: 02/03/18 1700       1840 (60 mg)-Given        0801 (60 mg)-Given        0940 (60 mg)-Given        0841 (60 mg)-Given           ibuprofen (ADVIL/MOTRIN) tablet 600 mg  Dose: 600 mg  Freq: EVERY 6 HOURS PRN Route: PO  PRN Reason: moderate pain  Start: 02/05/18 0701              latanoprost (XALATAN) 0.005 % ophthalmic solution 1 drop  Dose: 1 drop  Freq: AT BEDTIME Route: Both Eyes  Start: 02/03/18 2200   Admin Instructions: Refrigerated product.        2155 (1 drop)-Given        2046 (1 drop)-Given               2029 (1 drop)-Given               [ ] 2200           levothyroxine (SYNTHROID/LEVOTHROID) tablet 25 mcg  Dose: 25 mcg  Freq: EVERY MORNING BEFORE BREAKFAST Route: PO  Start: 02/04/18 0630   Admin Instructions: Separate oral administration of iron- or calcium-containing products and levothyroxine by at least 4 hours.         0555 (25 mcg)-Given        0611 (25 mcg)-Given        0658 (25 mcg)-Given           losartan (COZAAR) tablet 50 mg  Dose: 50 mg  Freq: 2 TIMES DAILY Route: PO  Start: 02/05/18 2000 2025 (50 mg)-Given        0841 (50 mg)-Given       [  ] 2000           metoprolol succinate (TOPROL-XL) 24 hr tablet 25 mg  Dose: 25 mg  Freq: 2 TIMES DAILY Route: PO  Start: 02/03/18 2000   Admin Instructions: DO NOT CRUSH. Tablet may be split in half along score line.        2108 (25 mg)-Given        0805 (25 mg)-Given       2045 (25 mg)-Given [C]        0939 (25 mg)-Given       2025 (25 mg)-Given        0841 (25 mg)-Given       [ ] 2000           miconazole (MICATIN; MICRO GUARD) 2 % powder  Freq: 2 TIMES DAILY Route: Top  Start: 02/03/18 2000   Admin Instructions: Apply to abd and groin folds bid        1751 ( )-Given       2119 ( )-Given        0806 ( )-Given       2048 ( )-Given        0950 ( )-Given       2030 ( )-Given        0844 ( )-Given       [ ] 2000           mirtazapine (REMERON) tablet 15 mg  Dose: 15 mg  Freq: AT BEDTIME Route: PO  Indications of Use: INSOMNIA  Start: 02/03/18 2200       2156 (15 mg)-Given        2047 (15 mg)-Given               2026 (15 mg)-Given               [ ] 2200           multivitamin (I-SUBHASH) per tablet 1 tablet  Dose: 1 tablet  Freq: DAILY Route: PO  Start: 02/03/18 1815       1840 (1 tablet)-Given        0803 (1 tablet)-Given        0943 (1 tablet)-Given        0841 (1 tablet)-Given           naloxone (NARCAN) injection 0.1-0.4 mg  Dose: 0.1-0.4 mg  Freq: EVERY 2 MIN PRN Route: IV  PRN Reason: opioid reversal  Start: 02/03/18 1651   Admin Instructions: For respiratory rate LESS than or EQUAL to 8.  Partial reversal dose:  0.1 mg titrated q 2 minutes for Analgesia Side Effects Monitoring Sedation Level of 3 (frequently drowsy, arousable, drifts to sleep during conversation).Full reversal dose:  0.4 mg bolus for Analgesia Side Effects Monitoring Sedation Level of 4 (somnolent, minimal or no response to stimulation).  For ordered doses up to 2mg give IVP. Give each 0.4mg over 15 seconds in emergency situations. For non-emergent situations further dilute in 9mL of NS to facilitate titration of response.               ondansetron  (ZOFRAN-ODT) ODT tab 4 mg  Dose: 4 mg  Freq: EVERY 6 HOURS PRN Route: PO  PRN Reasons: nausea,vomiting  Start: 02/03/18 1651   Admin Instructions: This is Step 1 of nausea and vomiting management.  If nausea not resolved in 15 minutes, go to Step 2 prochlorperazine (COMPAZINE). Do not push through foil backing. Peel back foil and gently remove. Place on tongue immediately. Administration with liquid unnecessary  With dry hands, peel back foil backing and gently remove tablet; do not push oral disintegrating tablet through foil backing; administer immediately on tongue and oral disintegrating tablet dissolves in seconds; then swallow with saliva; liquid not required.              Or  ondansetron (ZOFRAN) injection 4 mg  Dose: 4 mg  Freq: EVERY 6 HOURS PRN Route: IV  PRN Reasons: nausea,vomiting  Start: 02/03/18 1651   Admin Instructions: This is Step 1 of nausea and vomiting management.  If nausea not resolved in 15 minutes, go to Step 2 prochlorperazine (COMPAZINE).  Irritant. For ordered doses up to 4 mg, give IV Push undiluted over 2-5 minutes.               oxyCODONE IR (ROXICODONE) half-tab 2.5-5 mg  Dose: 2.5-5 mg  Freq: EVERY 4 HOURS PRN Route: PO  PRN Reason: moderate to severe pain  Start: 02/04/18 1845             Discontinued Medications  Medications 01/31/18 02/01/18 02/02/18 02/03/18 02/04/18 02/05/18 02/06/18         Rate: 125 mL/hr Dose: 1000 mL  Freq: CONTINUOUS Route: IV  Last Dose: Stopped (02/04/18 1459)  Start: 02/03/18 1009   End: 02/04/18 1539   Admin Instructions: Administer after the bolus.        1651 (1,000 mL)-New Bag       2136 (1,000 mL)-New Bag       2154 (1,000 mL)-Rate/Dose Verify        0547 (1,000 mL)-New Bag       1319 (1,000 mL)-New Bag       1459-Stopped       1539-Med Discontinued           Dose: 975 mg  Freq: EVERY 6 HOURS PRN Route: PO  PRN Reason: mild pain  Start: 02/03/18 1651   End: 02/04/18 1543   Admin Instructions: Maximum acetaminophen dose from all sources = 75  mg/kg/day not to exceed 4 grams/day.        1839 (975 mg)-Given        0801 (975 mg)-Given       1543-Med Discontinued           Dose: 1 tablet  Freq: 2 TIMES DAILY Route: PO  Start: 02/03/18 2000   End: 02/03/18 1820       1820-Med Discontinued            Dose: 0.2 mg  Freq: EVERY 2 HOURS PRN Route: IV  PRN Reason: moderate to severe pain  Start: 02/04/18 1847   End: 02/05/18 1519         1519-Med Discontinued          Dose: 0.2 mg  Freq: EVERY 2 HOURS PRN Route: IV  PRN Reason: other  PRN Comment: pain control or improvement in physical function. Hold dose for analgesic side effects.  Start: 02/03/18 1651   End: 02/03/18 2138   Admin Instructions: Notify provider to assess for uncontrolled pain or analgesic side effects. Hold while on PCA or with regular IV opioid dosing        2138-Med Discontinued            Dose: 0.3-0.5 mg  Freq: EVERY 2 HOURS PRN Route: IV  PRN Reason: moderate to severe pain  Start: 02/03/18 1651   End: 02/04/18 1847   Admin Instructions: For ordered doses up to 4 mg give IV Push undiluted. Administer each 2mg over 2-5 minutes.        2118 (0.5 mg)-Given        1035 (0.3 mg)-Given       1847-Med Discontinued           Dose: 0.5 mg  Freq: EVERY 15 MIN PRN Route: IV  PRN Reason: moderate to severe pain  Start: 02/03/18 1601   End: 02/03/18 1651   Admin Instructions: For ordered doses up to 4 mg give IV Push undiluted. Administer each 2mg over 2-5 minutes.        1613 (0.5 mg)-Given       1651-Med Discontinued            Dose: 25 mg  Freq: 2 TIMES DAILY Route: PO  Start: 02/03/18 2000   End: 02/05/18 1519       2108 (25 mg)-Given        0805 (25 mg)-Given       2043 (25 mg)-Given        0935 (25 mg)-Given       1519-Med Discontinued          Dose: 1 capsule  Freq: DAILY Route: PO  Start: 02/03/18 1700   End: 02/03/18 1813              1813-Med Discontinued            Dose: 4 mg  Freq: EVERY 30 MIN PRN Route: IV  PRN Reasons: nausea,vomiting  Start: 02/03/18 1007   End: 02/03/18 1654   Admin  Instructions: May repeat in 30 minutes as needed, up to 3 doses.  Irritant. For ordered doses up to 4 mg, give IV Push undiluted over 2-5 minutes.        1013 (4 mg)-Given       1610 (4 mg)-Given       1654-Med Discontinued            Dose: 2.5 mg  Freq: EVERY 4 HOURS PRN Route: PO  PRN Reason: moderate to severe pain  Start: 02/04/18 1844   End: 02/04/18 1845        1845-Med Discontinued           Dose: 2.5-5 mg  Freq: EVERY 4 HOURS PRN Route: PO  PRN Reason: moderate to severe pain  Start: 02/03/18 2133   End: 02/04/18 1844       2318 (5 mg)-Given        0343 (5 mg)-Given       0950 (2.5 mg)-Given       1319 (2.5 mg)-Given       1844-Med Discontinued      Medications 01/31/18 02/01/18 02/02/18 02/03/18 02/04/18 02/05/18 02/06/18

## 2018-02-03 NOTE — IP AVS SNAPSHOT
` ` Patient Information     Patient Name Sex     Lara Wills ([id number]) Female 1922       Room Bed    2211 2211-01      Patient Demographics     Address Phone E-mail Address    [address] [place] [address] [phone] (Home) [email or web address]      Patient Ethnicity & Race     Ethnic Group Patient Race    American White      Emergency Contact(s)     Name Relation Home Work Mobile    Harvey Wills Son [phone]    Dayami Porter Other [phone]    Julio Wills Other [phone]    Anh Wills Relative [phone]      Documents on File        Status Date Received Description       Documents for the Patient    Consent for Services - Hospital/Clinic Received () 13     Consent for EHR Access Received 13     Privacy Notice - [place] Received 13     Insurance Card Received 16 [employer]/Medicare    Patient ID Received 16     [facility] Specified Other       Advance Directives and Living Will Not Received  Invalid Directive Not Dated received    External Medication Information Consent Accepted 13     Advance Directives and Living Will Received 14 Health Care Directive 10-    Advance Directives and Living Will Not Received  Validation of AD 10-    HIM TRINO Authorization - File Only   MyChart Authorization 14    Advance Directives and Living Will Received 14 POLST 2014-     Consent for Services - Hospital/Clinic Received () 05/21/15     Consent for Services/Privacy Notice - Hospital/Clinic Received () 16     Consent for Services/Privacy Notice - Hospital/Clinic       HIM TRINO Authorization  () 16 [facility] [place]    Insurance Card Received 17 [employer]    Consent for Services - Geriatrics Received 17     Advance Directives and Living Will Received 17 POLST 01/10/2017    HIM TRINO  Authorization  01/26/17     Consent for Services/Privacy Notice - Hospital/Clinic Received 05/18/17 revd    Care Everywhere Prospective Auth       Care Everywhere Prospective Auth Received 10/31/17     External Medication Information Consent  (Deleted)         Documents for the Encounter    CMS IM for Patient Signature Received 02/03/18     Consent for Services - Informed  02/06/18 INFORMED CONSENT FOR SURGERY OR DESIGNATED PROCEDURE (SHORT VERSION)    CMS IM for Patient Signature Received 02/06/18 2nd IM    ECG   ECG Report      Admission Information     Attending Provider Admitting Provider Admission Type Admission Date/Time    Fabrizio Elder MD [place], Kiah Portillo MD Emergency 02/03/18  0930    Discharge Date Hospital Service Auth/Cert Status Service Area     General Medicine [facility]    Unit Room/Bed Admission Status       [facility] 2211/2211-01 Admission (Confirmed)       Admission     Complaint    Fall      Hospital Account     Name Acct ID Class Status Primary Coverage    Lara Wills [id number] Inpatient Open MEDICARE - MEDICARE FOR HB SUPPLEMENT            Guarantor Account (for Hospital Account #[id number])     Name Relation to Pt Service Area Active? Acct Type    Lara Wills Self FCS Yes Personal/Family    Address Phone          [address] [place] [address] [address]  [place] [address] [phone](H)              Coverage Information (for Hospital Account #[id number])     1. MEDICARE/MEDICARE FOR HB SUPPLEMENT     F/O Payor/Plan Precert #    MEDICARE/MEDICARE FOR HB SUPPLEMENT     Subscriber Subscriber #    Lara Wills [id number]    Address Phone    ATTN CLAIMS  [address]  [facility] [place] [address] [phone]          2. MEDICA/MEDICA PRIME SOLUTION     F/O Payor/Plan Precert #    MEDICA/MEDICA PRIME SOLUTION     Subscriber Subscriber #    Lara Wills [id number]    Address Phone    [address] [address]  [place] [address] [phone]

## 2018-02-03 NOTE — IP AVS SNAPSHOT
"` `           [facility] SURGICAL: [phone]                                              INTERAGENCY TRANSFER FORM - NURSING   2/3/2018                    Hospital Admission Date: 2/3/2018  MAVIS SHARP   : 1922  Sex: Female        Attending Provider: Fabrizio Elder MD     Allergies:  Codeine Sulfate, Penicillins    Infection:  None   Service:  GENERAL MEDI    Ht:  1.499 m (4' 11.02\")   Wt:  72.6 kg (160 lb)   Admission Wt:  72.6 kg (160 lb)    BMI:  32.3 kg/m 2   BSA:  1.74 m 2            Patient PCP Information     Provider PCP Type    Alejandra Watson MD General      Current Code Status     Date Active Code Status Order ID Comments User Context       Prior      Code Status History     Date Active Date Inactive Code Status Order ID Comments User Context    2018 11:16 AM  DNR/DNI [id number]  Fabrizio Elder MD Outpatient    2018  3:00 PM 2018 11:16 AM DNR/DNI [id number]  Kiah Savage MD Inpatient      Advance Directives        Scanned docmt in ACP Activity?           Yes, scanned ACP docmt (current copy in epic 2017-reviewed with daughter in law & pt)        Hospital Problems as of 2018              Priority Class Noted POA    Hyperlipidemia with target LDL less than 130 Medium  2013 Yes    Chronic constipation Medium  2013 Yes    Generalized anxiety disorder Medium  2013 Yes    Benign essential hypertension Medium  2016 Yes    Hypothyroidism, unspecified type Medium  2016 Yes    Shoulder dislocation, left, initial encounter Medium  2/3/2018 Yes    * (Principal)Closed supracondylar fracture of left humerus Medium  2/3/2018 Yes    Fall Medium  2/3/2018 Yes    Delirium Medium  2018 No    Hypoxemia Medium  2018 Yes    Interstitial fibrosis (H) Medium  2018 Yes      Non-Hospital Problems as of 2018              Priority Class Noted    Advance Care Planning Medium  2013    OA (osteoarthritis) of knee Medium  " 12/11/2013    Major depressive disorder, single episode, mild (H) Medium  5/20/2016      Immunizations     Name Date      Influenza (High Dose) 3 valent vaccine 10/01/17     Influenza (High Dose) 3 valent vaccine 10/07/16     Influenza (IIV3) PF 11/01/15     Influenza (IIV3) PF 10/03/13     Pneumo Conj 13-V (2010&after) 10/07/16     Pneumococcal 23 valent 01/01/01     TDAP Vaccine (Adacel) 12/07/16     Tetanus 08/18/04          END      ASSESSMENT     Discharge Profile Flowsheet     EXPECTED DISCHARGE     PAS Number  -- ([id number]) 02/06/18 1131    Expected Discharge Date  02/06/18 02/05/18 1132   Referrals Placed  TCU 02/06/18 1131    DISCHARGE NEEDS ASSESSMENT     SKIN      Concerns To Be Addressed  basic needs concerns;mental health concerns;home safety concerns 01/05/17 0842   Inspection of bony prominences  Full 02/06/18 1026    Equipment Currently Used at Home  oxygen;walker, rolling 02/06/18 1131   Inspection under devices  Full 02/06/18 1026    Transportation Available  none 02/05/18 1406   Skin WDL  ex 02/06/18 1026    GASTROINTESTINAL (ADULT,PEDIATRIC,OB)     Not Inspected under devices.  -- (unable to inspect under upper immobilizer-unstable) 02/04/18 1207    GI WDL  WDL 02/06/18 1026   Skin Color/Characteristics  bruised (ecchymotic) 02/06/18 1026    Last Bowel Movement  02/03/18 02/05/18 1913   Skin Temperature  warm 02/06/18 1026    Passing flatus  yes 02/06/18 1026   Skin Moisture  dry 02/06/18 1026    COMMUNICATION ASSESSMENT     Full except areas not inspected   Spine;Hip, left;Hip, right;Buttock, left;Buttock, right;Sacrum;Coccyx 02/06/18 0120    Patient's communication style  spoken language (English or Bilingual) 02/03/18 1822   Skin Integrity  rash(es) 02/06/18 1026    FINAL RESOURCES     SAFETY      Resources List  Skilled Nursing Facility 02/06/18 1131   Safety WDL  WDL 02/06/18 1026    Assisted Living  -- ([facility]) 02/06/18 1131   All Alarms  alarm(s) activated and audible 02/06/18 0908  "   Other Resources  -- 01/04/17 1806   Safety Factors  bed in low position 02/06/18 0120    Skilled Nursing Facility  [place] [phone], Fax: [phone] 02/06/18 1131                      Assessment WDL (Within Defined Limits) Definitions           Safety WDL     Effective: 09/28/15    Row Information: <b>WDL Definition:</b> Bed in low position, wheels locked; call light in reach; upper side rails up x 2; ID band on<br> <font color=\"gray\"><i>Item=AS safety wdl>>List=AS safety wdl>>Version=F14</i></font>      Skin WDL     Effective: 09/28/15    Row Information: <b>WDL Definition:</b> Warm; dry; intact; elastic; without discoloration; pressure points without redness<br> <font color=\"gray\"><i>Item=AS skin wdl>>List=AS skin wdl>>Version=F14</i></font>      Vitals     Vital Signs Flowsheet     VITAL SIGNS     Comfort  negligible pain 02/05/18 0119    Temp  97.6  F (36.4  C) 02/06/18 0804   Change in Pain  getting better 02/04/18 1545    Temp src  Oral 02/06/18 0804   Pain Control  fully effective 02/04/18 1545    Resp  18 02/06/18 0804   ANALGESIA SIDE EFFECTS MONITORING      Pulse  85 02/06/18 0804   Side Effects Monitoring: Respiratory Quality  R 02/05/18 0119    Heart Rate  92 02/05/18 2349   Side Effects Monitoring: Respiratory Depth  N 02/05/18 0119    Pulse/Heart Rate Source  Monitor 02/06/18 0804   Side Effects Monitoring: Sedation Level  1 02/05/18 0119    BP  156/78 02/06/18 0804   HEIGHT AND WEIGHT      BP Location  Right arm 02/05/18 2349   Height Method  Stated 02/03/18 0936    OXYGEN THERAPY     Weight  72.6 kg (160 lb) 02/03/18 0936    SpO2  92 % 02/06/18 0804   POSITIONING      O2 Device  Nasal cannula 02/06/18 0804   Body Position  independently positioning 02/06/18 1026    Oxygen Delivery  2 LPM 02/06/18 0804   Head of Bed (HOB)  HOB at 45 degrees 02/06/18 1026    PAIN/COMFORT     Positioning/Transfer Devices  immobilization device 02/06/18 0621    Patient Currently in Pain  denies " 02/05/18 0119   Chair  Recline and up in chair 02/05/18 1207    Preferred Pain Scale  CAPA (Clinically Aligned Pain Assessment) ([facility] Adults Only) 02/04/18 0801   DAILY CARE      0-10 Pain Scale  8 02/03/18 1613   Activity Management  up to bedside commode;up in chair 02/06/18 0927    Pain Management Interventions  analgesia administered 02/05/18 0119   Activity Assistance Provided  assistance, 1 person 02/06/18 0927    Pain Intervention(s)  Medication (See eMAR);Cold applied 02/04/18 1152   RESPIRATORY MONITORING      CLINICALLY ALIGNED PAIN ASSESSMENT (CAPA) ([facility] ADULTS ONLY)     Respiratory Monitoring (EtCO2)  0 mmHg 02/03/18 1349            Patient Lines/Drains/Airways Status    Active LINES/DRAINS/AIRWAYS     Name: Placement date: Placement time: Site: Days: Last dressing change:    Peripheral IV 02/03/18 Right Upper forearm 02/03/18   0957   Upper forearm   3     Rash 02/03/18 1700 abdomen other (see comments) 02/03/18   1700    2     Rash 02/03/18 1702 Bilateral groin 02/03/18   1702    2             Patient Lines/Drains/Airways Status    Active PICC/CVC     None            Intake/Output Detail Report     Date Intake     Output Net    Shift P.O. I.V. IV Piggyback Total Urine Total       Noc 02/04/18 2300 - 02/05/18 0659 -- -- -- -- -- -- 0    Day 02/05/18 0700 - 02/05/18 1459 -- -- -- -- 250 250 -250    Marilee 02/05/18 1500 - 02/05/18 2259 -- -- -- -- 750 750 -750    Noc 02/05/18 2300 - 02/06/18 0659 -- -- -- -- 975 975 -975    Day 02/06/18 0700 - 02/06/18 1459 -- -- -- -- 500 500 -500      Last Void/BM       Most Recent Value    Urine Occurrence 1 at 02/06/2018 0926    Stool Occurrence 1 at 02/06/2018 0926      Case Management/Discharge Planning     Case Management/Discharge Planning Flowsheet     REFERRAL INFORMATION     DISCHARGE PLANNING      Did the Initial Social Work Assessment result in a Social Work Case?  No 02/04/18 1142   Transportation Available  none  02/05/18 1406    Reason For Consult  discharge planning 02/04/18 1142   FINAL RESOURCES      Primary Care Clinic Name  [facility] 02/04/18 1142   Equipment Currently Used at Home  oxygen;walker, rolling 02/06/18 1131    Primary Care MD Name  Dr. Watson 02/04/18 1142   Resources List  Skilled Nursing Facility 02/06/18 1131    LIVING ENVIRONMENT     Assisted Living  -- ([facility]) 02/06/18 1131    Lives With  alone 02/05/18 1406   Other Resources  -- 01/04/17 1806    Living Arrangements  assisted living 02/05/18 1406   Skilled Nursing Facility  [place] [place] [phone], Fax: [phone] 02/06/18 1131    ASSESSMENT OF FAMILY/SOCIAL SUPPORT     PAS Number  -- ([id number]) 02/06/18 1131    Who is your support system?  Children;Facility resident(s)/Staff 02/04/18 1142   Referrals Placed  TCU 02/06/18 1131    Description of Support System  Supportive;Involved 02/04/18 1142   ABUSE RISK SCREEN      Support Assessment  Adequate family and caregiver support 02/04/18 1142   QUESTION TO PATIENT:  Has a member of your family or a partner(now or in the past) intimidated, hurt, manipulated, or controlled you in any way?  no 02/03/18 1828    COPING/STRESS     QUESTION TO PATIENT: Do you feel safe going back to the place where you are living?  yes 02/03/18 1828    Major Change/Loss/Stressor  none 02/03/18 1828   OBSERVATION: Is there reason to believe there has been maltreatment of a vulnerable adult (ie. Physical/Sexual/Emotional abuse, self neglect, lack of adequate food, shelter, medical care, or financial exploitation)?  no 02/03/18 1828    EXPECTED DISCHARGE     OTHER      Expected Discharge Date  02/06/18 02/05/18 1132   Are you depressed or being treated for depression?  No (she denies, she is on antidepressants ) 02/03/18 1846    ASSESSMENT/CONCERNS TO BE ADDRESSED     HOMICIDE RISK      Concerns To Be Addressed  basic needs concerns;mental health concerns;home safety concerns 01/05/17 0846    Feels Like Hurting Others  no 02/03/18 1823

## 2018-02-03 NOTE — IP AVS SNAPSHOT
[facility]    [address] [address]    Phone:  [phone]    Fax:  [phone]                                       After Visit Summary   2/3/2018    Lara Wills    MRN: [id number]           After Visit Summary Signature Page     I have received my discharge instructions, and my questions have been answered. I have discussed any challenges I see with this plan with the nurse or doctor.    ..........................................................................................................................................  Patient/Patient Representative Signature      ..........................................................................................................................................  Patient Representative Print Name and Relationship to Patient    ..................................................               ................................................  Date                                            Time    ..........................................................................................................................................  Reviewed by Signature/Title    ...................................................              ..............................................  Date                                                            Time

## 2018-02-03 NOTE — PROGRESS NOTES
[place] [facility] ADMISSION NOTE    Patient admitted to room 2211 at approximately 1640 via cart from emergency room. Patient was accompanied by son.     Verbal SBAR report received from [place] prior to patient arrival.     Patient trasferred to bed via airmat. Patient alert and oriented X 3. Pain is controlled with current analgesics.  Medication(s) being used: narcotic analgesics including dilaudid. 0-10 Pain Scale: 8. Admission vital signs: Blood pressure 175/89, pulse 78, temperature 98.2  F (36.8  C), temperature source Oral, resp. rate 18, weight 72.6 kg (160 lb), SpO2 92 %, not currently breastfeeding. Patient was oriented to plan of care, call light, bed controls, tv, telephone, bathroom and visiting hours.     The following safety risks were identified during admission: fall. Yellow risk band applied: YES.     Yeni Beatty

## 2018-02-03 NOTE — IP AVS SNAPSHOT
` `     [facility] SURGICAL: [phone]                 INTERAGENCY TRANSFER FORM - NOTES (H&P, Discharge Summary, Consults, Procedures, Therapies)   2/3/2018                    Hospital Admission Date: 2/3/2018  MAVIS SHARP   : 1922  Sex: Female        Patient PCP Information     Provider PCP Type    Alejandra Watson MD General         History & Physicals      H&P by Kiah Savage MD at 2/3/2018  4:22 PM     Author:  Kiah Savage MD Service:  (none) Author Type:  Fellow    Filed:  2/3/2018  4:34 PM Date of Service:  2/3/2018  4:22 PM Creation Time:  2/3/2018  4:22 PM    Status:  Signed :  Kiah Savage MD (Fellow)         History and Physical   Hospitalist Service  2/3/2018    Kiah Savage Mavis Sharp  MRN# [id number]                          YOB: 1922  Age: 89 year old      Date of Admission:  2/3/2018         Primary Care Physician:   Alejandra Watson [phone]         Assessment and Plan:[SW1.1]   Active Problems:    Shoulder dislocation, left, initial encounter[SW1.2]- reduced in ER[SW1.1]    Closed supracondylar fracture of left humerus[SW1.2]- ortho notified by ED provider. Pain meds. Will need placement because unable to care for self. Uses walker at home.[SW1.1]    Hyperlipidemia with target LDL less than 130[SW1.2]- not on home meds[SW1.1]    Chronic constipation[SW1.2]- continue home colace dose. May need to add miralax since requiring opiate    Gene[SW1.1]ralized anxiety disorder[SW1.2]- continue home meds[SW1.1]    Benign essential hypertension[SW1.2]- continue home meds. Mildly hypertensive in ED but, secondary to pain and did not have meds yet today because of fall[SW1.1]    Hypothyroidism, unspecified type[SW1.2]- continue home synthroid (last tsh/t4 10/31/17, reviewed)    FEN: PIV              Code Status:   DNR / DNI- confirmed with pt. POLST available         Chief Complaint:     Fall and left shoulder  pain         History of Present Illness:   The history is obtained from the patient.    Pt presented to the ED with a fall this morning.  She reports that she stood and walked to the bathroom and simply slipped and fell onto her left shoulder.  She did not hit her head and she had no dizziness or lightheadedness, as well as no chest pain or palpitations prior to falling.  No loss of consciousness.  EMS was called and she was found to have a dislocated shoulder which was reduced in the emergency department and a fracture of the left humerus.  She is now in a shoulder immobilizer and still has some pain but improved.  Patient uses a walker to ambulate and is unable to discharge and will require placement.  She reports that she is otherwise feeling well.           Past Medical History:[SW1.1]     Patient Active Problem List   Diagnosis     Advance Care Planning     Hyperlipidemia with target LDL less than 130     Chronic constipation     OA (osteoarthritis) of knee     Generalized anxiety disorder     Major depressive disorder, single episode, mild (H)     Benign essential hypertension     Hypothyroidism, unspecified type     Shoulder dislocation, left, initial encounter     Closed supracondylar fracture of left humerus[SW1.2]            Past Surgical History:[SW1.1]     Past Surgical History:   Procedure Laterality Date     APPENDECTOMY  1947     BREAST BIOPSY, RT/LT      right - benign     CATARACT IOL, RT/LT      bilateral     partial thyroidectomy       VITRECTOMY,STRIP EPIRETINAL MEMBRANE  2002[SW1.2]            Home Medications:     Prior to Admission medications    Medication Sig Last Dose Taking? Auth Provider   levothyroxine (SYNTHROID/LEVOTHROID) 25 MCG tablet TAKE 1 TABLET (25 MCG) BY MOUTH DAILY 2/2/2018 at 0600 Yes Alejandra Watson MD   losartan (COZAAR) 25 MG tablet TAKE 1 TABLET (25 MG) BY MOUTH 2 TIMES DAILY 2/2/2018 at 0800 Yes Alejandra Watson MD   amLODIPine (NORVASC) 5 MG tablet TAKE 1 TABLET BY  MOUTH TWICE DAILY 2/3/2018 at 0800 Yes Alejandra Watson MD   brimonidine (ALPHAGAN) 0.2 % ophthalmic solution Place 1 drop Into the left eye 2 times daily 2/2/2018 at 2000 Yes Reported, Patient   metoprolol (TOPROL-XL) 25 MG 24 hr tablet Take 1 tablet (25 mg) by mouth 2 times daily 2/2/2018 at 0800 Yes Alejandra Watson MD   busPIRone (BUSPAR) 10 MG tablet Take 10 mg by mouth 2 times daily 0800, 1700 2/2/2018 at 0800 Yes Reported, Patient   VITAMIN D, CHOLECALCIFEROL, PO Take 2,000 Units by mouth daily At 1200 2/2/2018 at 1200 Yes Reported, Patient   DULoxetine (CYMBALTA) 60 MG EC capsule Take 60 mg by mouth daily 2/2/2018 at 0800 Yes Reported, Patient   mirtazapine (REMERON) 15 MG tablet Take 15 mg by mouth At Bedtime 2/2/2018 at hs Yes Reported, Patient   order for DME Oxygen 2 Li/min  at night 2/2/2018 at hs Yes Noble Ojeda MD   latanoprost (XALATAN) 0.005 % ophthalmic solution Place 1 drop into both eyes At Bedtime 2/2/2018 at hs Yes Klaudia Hernandez MD   multivitamin  with lutein (OCUVITE WITH LTEIN) CAPS Take 1 capsule by mouth daily At 1200 2/2/2018 at 1200 Yes Reported, Patient   calcium-vitamin D (CALTRATE) 600-400 MG-UNIT per tablet Take 1 tablet by mouth 2 times daily At 1200, and 8 pm 2/2/2018 at 2000 Yes Reported, Patient   docusate sodium (COLACE) 100 MG capsule Take 100 mg by mouth 3 times daily 0800, 1200, 2000 2/2/2018 at 2000 Yes Reported, Patient   zolpidem (AMBIEN) 5 MG tablet Take 0.5 tablets (2.5 mg) by mouth nightly as needed for sleep Unknown at Unknown time  Alejandra Watson MD   aspirin-acetaminophen-caffeine (EXCEDRIN EXTRA STRENGTH) 250-250-65 MG per tablet Take 1 tablet by mouth every 6 hours as needed Unknown at Unknown time  Reported, Patient            Social History:[SW1.1]     Social History   Substance Use Topics     Smoking status: Never Smoker     Smokeless tobacco: Never Used     Alcohol use No[SW1.2]             Family History:[SW1.1]     Family History    Problem Relation Age of Onset     C.A.D. Mother      Cancer - colorectal Father      Alzheimer Disease Brother      Neurologic Disorder Brother      parkinson     CANCER Daughter      lymphoma[SW1.2]             Allergies:[SW1.1]     Allergies   Allergen Reactions     Codeine Sulfate Unknown     Penicillins Unknown[SW1.2]             Review of Systems:   The 10 point Review of Systems is negative other than noted in the HPI           Physical Exam:[SW1.1]   Blood pressure (!) 167/91, pulse 78, temperature 98  F (36.7  C), temperature source Oral, resp. rate 18, weight 72.6 kg (160 lb), SpO2 92 %, not currently breastfeeding.  Vitals:    18 0935   Weight: 72.6 kg (160 lb)[SW1.2]     Vital Sign Ranges  Temperature Temp  Av  F (36.7  C)  Min: 98  F (36.7  C)  Max: 98  F (36.7  C)   Blood pressure Systolic (24hrs), Av , Min:113 , Max:193        Diastolic (24hrs), Av, Min:67, Max:110      Pulse Pulse  Av  Min: 78  Max: 78   Respirations Resp  Av  Min: 18  Max: 18   Pulse oximetry SpO2  Av.4 %  Min: 90 %  Max: 99 %         Constitutional: Awake, alert, cooperative, no apparent distress   Psych: Alert, oriented to person, place and time. Appears sleepy   Neuro: Cranial nerves 2-12 grossly intact, normal strength and sensation. 9not tested in left arm)   Eyes: Conjunctiva, pupils, lids examined and normal.   ENT: Moist mucous membranes, normal dentition.   Lymph: No anterior cervical or supraclavicular adenopathy.   Cardiovascular: Regular rate and rhythm, normal S1 and S2   Lungs: No respiratory distress. Clear to auscultation bilaterally, no crackles or wheezing   GI: soft, non-tender, non-distended, normal bowel sounds   Skin: No rashes, no cyanosis, no edema   Musculoskeletal: No joint swelling, erythema or tenderness. Left shoulder not examined (immobilized)  Hip and pelvis exam reveals no crepitus and normal range of motion for her age.  No bruising or redness noted.          Data:    All new lab and imaging data was reviewed.[SW1.1]   Recent Labs   Lab Test  02/03/18   0950  10/31/17   1115   WBC  9.6  7.7   HGB  13.1  12.7   MCV  90  91   PLT  393  323      Recent Labs   Lab Test  02/03/18   0950  10/31/17   1115   NA  143  144   POTASSIUM  3.5  3.6   CHLORIDE  106  107   CO2  29  31   BUN  15  16   CR  0.78  0.99   ANIONGAP  8  6   EVA  8.4*  9.0   GLC  103*  82     Recent Labs   Lab Test  12/10/16   0410  12/10/16   0128   TROPI  <0.015  The 99th percentile for upper reference range is 0.045 ug/L.  Troponin values in   the range of 0.045 - 0.120 ug/L may be associated with risks of adverse   clinical events.    <0.015  The 99th percentile for upper reference range is 0.045 ug/L.  Troponin values in   the range of 0.045 - 0.120 ug/L may be associated with risks of adverse   clinical events.[SW1.2]           Imaging:   HUMERUS LEFT TWO OR MORE VIEWS    2/3/2018 11:00 AM      HISTORY: Fall, pain.     COMPARISON: None.         IMPRESSION: Fracture dislocation of the glenohumeral joint is present  with a large Hill-Sachs deformity and some fracture fragments  projecting in the joint space. There is also smaller osseous densities  which could be due to loose bodies. The mid and distal humerus appear  Intact.    LEFT SHOULDER THREE VIEWS  2/3/2018 11:00 AM      HISTORY: Fall, pain.     COMPARISON: None.         IMPRESSION: There is an anteroinferior glenohumeral joint dislocation  with associated fracture of the humeral head with some fracture  fragments head. There are several fracture fragments projecting just  lateral to the glenoid.       XR SHOULDER LT PORT G/E 2 VW 2/3/2018 1:29 PM     COMPARISON: Radiographs earlier on the same day.     HISTORY: Postreduction.         IMPRESSION: Normal alignment of the left glenohumeral joint following  reduction. Multiple calcific fragments are seen adjacent to the  humeral head, may represent fractures related to the dislocation,  versus fractured  osteophytes or joint bodies.      ECG- NSR without acute ST changes. poor rwave progression.       Assessment and Plan above.  Admission time: less than 60 minutes    Kiah Savage   2/3/2018[SW1.1]        Revision History        User Key Date/Time User Provider Type Action    > SW1.2 2/3/2018  4:34 PM Kiah Savage MD Fellow Sign     SW1.1 2/3/2018  4:22 PM Kiah Savage MD Fellow                      Discharge Summaries      Discharge Summaries by Fabrizio Elder MD at 2/6/2018 11:33 AM     Author:  Fabrizio Elder MD Service:  Hospitalist Author Type:  Physician    Filed:  2/6/2018 11:34 AM Date of Service:  2/6/2018 11:33 AM Creation Time:  2/6/2018 11:17 AM    Status:  Signed :  Fabrizio Elder MD (Physician)         [facility]    Discharge Summary  Hospital Medicine    Date of Admission:  2/3/2018  Date of Discharge:  2/6/2018   Discharging Provider: Fabrizio Elder  Date of Service: 2/6/2018      Primary Care     Alejandra Watson  [id number] [facility] [address]      Identification and Chief Compaint: Lara Wills is a 89 year old female who presented on 2/3/2018 with a left shoulder dislocation and left humerus fracture.    Discharge Diagnoses       Closed supracondylar fracture of left humerus    Hyperlipidemia with target LDL less than 130    Chronic constipation    Generalized anxiety disorder    Benign essential hypertension    Hypothyroidism, unspecified type    Shoulder dislocation, left, initial encounter    Fall    Delirium    Hypoxemia    Interstitial fibrosis (H)            Discharge Disposition   Discharged to short-term care facility    Discharge Orders     General info for SNF   Length of Stay Estimate: Short Term Care: Estimated # of Days <30  Condition at Discharge: Improving  Level of care:skilled   Rehabilitation Potential: Good  Admission H&P remains valid and up-to-date: Yes  Recent Chemotherapy:  N/A  Use Nursing Home Standing Orders: Yes     Mantoux instructions   Give two-step Mantoux (PPD) Per Facility Policy Yes     Reason for your hospital stay   You were in the hospital following fracture and dislocation of your left shoulder and arm.  You're recovering from that now, and at this point will benefit from going to a Transitional Care Unit to continue your rehabilitation from that injury.     Activity - Up with nursing assistance     DNR/DNI     Physical Therapy Adult Consult   Evaluate and treat as clinically indicated.    Reason:  S/P left humerus fracture and shoulder dislocation.  Is globally weak following hospitalization.     Occupational Therapy Adult Consult   Evaluate and treat as clinically indicated.    Reason:  S/P left humerus fracture and shoulder dislocation.  Is globally weak following hospitalization.     Oxygen - Nasal cannula   Daytime use is PRN use of 2 L/min by nasal cannula to keep O2 sats 90% or greater.  Nocturnal use is 2 L/min with sleep every night.     Advance Diet as Tolerated   Follow this diet upon discharge:     Advance Diet as Tolerated: Regular Diet Adult          Discharge Medications   Current Discharge Medication List      START taking these medications    Details   oxyCODONE IR (ROXICODONE) 5 MG tablet Take 0.5-1 tablets (2.5-5 mg) by mouth every 4 hours as needed for moderate to severe pain  Qty: 18 tablet, Refills: 0    Associated Diagnoses: Closed supracondylar fracture of left humerus with routine healing, subsequent encounter      acetaminophen (TYLENOL) 325 MG tablet Take 3 tablets (975 mg) by mouth every 8 hours  Qty: 100 tablet, Refills: 3    Associated Diagnoses: Closed supracondylar fracture of left humerus with routine healing, subsequent encounter      ibuprofen (ADVIL/MOTRIN) 600 MG tablet Take 1 tablet (600 mg) by mouth every 6 hours as needed for moderate pain  Qty: 120 tablet, Refills: 3    Associated Diagnoses: Closed supracondylar fracture of left  humerus with routine healing, subsequent encounter         CONTINUE these medications which have NOT CHANGED    Details   levothyroxine (SYNTHROID/LEVOTHROID) 25 MCG tablet TAKE 1 TABLET (25 MCG) BY MOUTH DAILY  Qty: 90 tablet, Refills: 1    Associated Diagnoses: Hypothyroidism, unspecified type      losartan (COZAAR) 25 MG tablet TAKE 1 TABLET (25 MG) BY MOUTH 2 TIMES DAILY  Qty: 180 tablet, Refills: 1    Associated Diagnoses: Benign essential hypertension      amLODIPine (NORVASC) 5 MG tablet TAKE 1 TABLET BY MOUTH TWICE DAILY  Qty: 180 tablet, Refills: 1    Associated Diagnoses: Benign essential hypertension      brimonidine (ALPHAGAN) 0.2 % ophthalmic solution Place 1 drop Into the left eye 2 times daily      metoprolol (TOPROL-XL) 25 MG 24 hr tablet Take 1 tablet (25 mg) by mouth 2 times daily  Qty: 180 tablet, Refills: 3    Comments: DISCONTINUE order for the 25 mg daily metoprolol xl.  Associated Diagnoses: Benign essential hypertension      busPIRone (BUSPAR) 10 MG tablet Take 10 mg by mouth 2 times daily 0800, 1700      VITAMIN D, CHOLECALCIFEROL, PO Take 2,000 Units by mouth daily At 1200      DULoxetine (CYMBALTA) 60 MG EC capsule Take 60 mg by mouth daily      mirtazapine (REMERON) 15 MG tablet Take 15 mg by mouth At Bedtime      order for DME Oxygen 2 Li/min  at night    Associated Diagnoses: Other secondary hypertension      latanoprost (XALATAN) 0.005 % ophthalmic solution Place 1 drop into both eyes At Bedtime  Qty: 1 Bottle, Refills: 4      multivitamin  with lutein (OCUVITE WITH LTEIN) CAPS Take 1 capsule by mouth daily At 1200      calcium-vitamin D (CALTRATE) 600-400 MG-UNIT per tablet Take 1 tablet by mouth 2 times daily At 1200, and 8 pm      docusate sodium (COLACE) 100 MG capsule Take 100 mg by mouth 3 times daily 0800, 1200, 2000      zolpidem (AMBIEN) 5 MG tablet Take 0.5 tablets (2.5 mg) by mouth nightly as needed for sleep  Qty: 30 tablet, Refills: 0    Comments: Order changed from  nightly to PRN nightly per pt request.  Associated Diagnoses: Generalized anxiety disorder         STOP taking these medications       aspirin-acetaminophen-caffeine (EXCEDRIN EXTRA STRENGTH) 250-250-65 MG per tablet Comments:   Reason for Stopping:             Allergies   Allergies   Allergen Reactions     Codeine Sulfate Unknown     Penicillins Unknown       Consultations This Hospital Stay   Consultation during this admission received from:    ORTHOPEDIC SURGERY IP CONSULT  PHYSICAL THERAPY ADULT IP CONSULT  CARE TRANSITION RN/SW IP CONSULT  PHYSICAL THERAPY ADULT IP CONSULT  OCCUPATIONAL THERAPY ADULT IP CONSULT    Significant Results and Procedures   Procedures    Closed reduction of left shoulder dislocation in [facility] ED on 2/3/17.    Data   Results for orders placed or performed during the hospital encounter of 02/03/18   Shoulder XR, 2 view left    Narrative    LEFT SHOULDER THREE VIEWS  2/3/2018 11:00 AM     HISTORY: Fall, pain.    COMPARISON: None.      Impression    IMPRESSION: There is an anteroinferior glenohumeral joint dislocation  with associated fracture of the humeral head with some fracture  fragments head. There are several fracture fragments projecting just  lateral to the glenoid.     CHET ROSE MD   XR Humerus Left G/E 2 Views    Narrative    HUMERUS LEFT TWO OR MORE VIEWS    2/3/2018 11:00 AM     HISTORY: Fall, pain.    COMPARISON: None.      Impression    IMPRESSION: Fracture dislocation of the glenohumeral joint is present  with a large Hill-Sachs deformity and some fracture fragments  projecting in the joint space. There is also smaller osseous densities  which could be due to loose bodies. The mid and distal humerus appear  intact.    CHET ROSE MD   XR Shoulder Left Port G/E 2 Views    Narrative    XR SHOULDER LT PORT G/E 2 VW 2/3/2018 1:29 PM    COMPARISON: Radiographs earlier on the same day.    HISTORY: Postreduction.      Impression    IMPRESSION: Normal alignment of the  left glenohumeral joint following  reduction. Multiple calcific fragments are seen adjacent to the  humeral head, may represent fractures related to the dislocation,  versus fractured osteophytes or joint bodies.    GOSIA BUSTOS MD   XR Chest Port 1 View    Narrative    CHEST PORTABLE ONE VIEW February 6, 2018 6:30 AM     HISTORY: Evaluate causes of hypoxemia.     COMPARISON: 12/4/2013.      Impression    IMPRESSION: There is mild stable enlargement of the cardiac  silhouette. There is diffuse interstitial prominence. Atelectasis or  scarring in the lung bases. No consolidative infiltrates. Old fracture  deformity of the left humeral head.    SHANNON SINHA MD       History of Present Illness   The history is obtained from the patient.     Pt presented to the ED with a fall this morning.  She reports that she stood and walked to the bathroom and simply slipped and fell onto her left shoulder.  She did not hit her head and she had no dizziness or lightheadedness, as well as no chest pain or palpitations prior to falling.  No loss of consciousness.  EMS was called and she was found to have a dislocated shoulder which was reduced in the emergency department and a fracture of the left humerus.  She is now in a shoulder immobilizer and still has some pain but improved.  Patient uses a walker to ambulate and is unable to discharge and will require placement.  She reports that she is otherwise feeling well.    Hospital Course   Lara Wills was admitted on 2/3/2018.  The following problems were addressed during her hospitalization:      Shoulder dislocation, left, initial encounter- Underwent closed reduction in ED, but remains unstable. Ortho consulted and recommend continuing the immobilizer and Physical Therapy.  Immobilizer has been in place.       Closed supracondylar fracture of left humerus - Ortho was consulted, recommended non-operative treatment.  Pain control had been initially difficult to obtain, was on  hydromorphone IV and oxycodone x a few doses.  For the last 36 hours of the hospital stay, analgesia has been adequate with scheduled acetaminophen and ibuprofen.  I will send her to TCU with a limited supply of oxycodone in case pain becomes more severe during therapies.        Hyperlipidemia with target LDL less than 130 - Not on home meds.       Chronic constipation - Controlled with home colace and Miralax PRN.       Generalized anxiety disorder- Good control despite hospitalization.  Continued buspirone, duloxetine, and mirtazapine unchanged.       Benign essential hypertension - Remained hypertensive on pre-admission Rx of metoprolol 25 mg BID, amlodipine 5 mg BID, and losartan 25 mg BID.  We doubled losartan to 50 mg BID with good BP response, will use this dose at discharge.       Hypothyroidism, unspecified type- TSH mildly elevated, T4 normal in 10/2017.  Appears clinically euthyroid.  We continued home levothyroxine unchanged.        Hypoxemia - Was receiving home O2 2 L/min at night prior to admission.  Required daytime O2 here in-house, currently at 2 L/min.  Etiology of hypoxemia appears to be due to some mild interstitial fibrotic lung disease which was seen on CXR 2/6/18, and was also present on a 2013 CXR but appears to have progressed mildly since that time.  Given that fibrotic disease is not going to be reversible, I think she'll have continued O2 needs, perhaps round-the-clock.  O2 orders entered for TCU.       Delirium - Emerged the morning of 2/5/17.  Etiology is unclear, though suspect it was toxic/metabolic due to opioids.  Opioids were D/C'd 2/5/18 AM, mental status resolved to normal over the next several hours.      Pending Results   Unresulted Labs Ordered in the Past 30 Days of this Admission     No orders found from 12/5/2017 to 2/4/2018.        ROS:  CONSTITUTIONAL: NEGATIVE for chills, fever, sweats.  EYES: NEGATIVE for acute visual changes, eye irritation.  ENT/MOUTH: NEGATIVE for  nasal congestion, postnasal drainage.  RESP: NEGATIVE for dyspnea, cough, wheeze, or respiratory chest pain   CV: NEGATIVE for chest pain, palpitations, orthopnea, or lower extremity edema.  GI: NEGATIVE for difficulties swallowing, abdominal pain, diarrhea, nausea or vomiting.  : NEGATIVE for dysuria or flank pain.  MUSCULOSKELETAL: NEGATIVE for back pain.  NEURO: NEGATIVE for focal numbness or weakness, syncope, stroke or seizure.  PSYCHIATRIC: Anxiety is well-controlled, no panic or recent change in mood.    Physical Exam   Temp:  [97.6  F (36.4  C)-99.8  F (37.7  C)] 97.6  F (36.4  C)  Pulse:  [81-85] 85  Heart Rate:  [92] 92  Resp:  [18] 18  BP: (144-158)/(71-78) 156/78  SpO2:  [91 %-94 %] 92 % on 2 L/min O2.  Vitals:    02/03/18 0935   Weight: 72.6 kg (160 lb)       GENERAL: Pleasant woman seated in recliner talking to family.  Looks comfortable.  EYES: Eyes grossly normal to inspection, extraocular movements intact  HENT: Nares patent bilaterally.  Nasal mucosa normal, no discharge.    NECK: Trachea midline, no stridor.    RESP: No accessory muscle use.  Symmetrical breath sounds.  Lungs clear anteriorly on inspiration and expiration.  Expiration not prolonged, no wheeze.  CV: Regular rate and rhythm, non-tachycardic.  Normal S1 S2, grade II/VI mid-systolic murmur heard est at the upper sternal borders, no extra sound.  No lower extremity edema.  ABDOMEN: Soft, non-tender, no guarding.  Liver and spleen not enlarged, no masses palpable.  Bowel sounds positive.  MS: No red or inflamed joints.  SKIN: Warm and dry, no rashes where skin visible.  Immobilizer not removed to permit left shoulder exam.  NEURO: Alert, oriented, conversant.  Cranial nerves II - XII grossly intact.  No gross motor or sensory deficits.  Gait not tested.  PSYCH: Calm, alert, conversant.  Able to articulate logical thoughts, no tangential thoughts, no hallucinations or delusions.  Affect normal.       The discharge plan was discussed  with the patient and her daughter.    Total time on this discharge was 40 minutes.    Fabrizio Elder[JM1.1]                 Revision History        User Key Date/Time User Provider Type Action    > JM1.1 2/6/2018 11:34 AM Fabrizio Elder MD Physician Sign                     Consult Notes      Consults by Cele Canseco RN at 2/4/2018 11:49 AM     Author:  Cele Canseco RN Service:  (none) Author Type:      Filed:  2/4/2018 11:49 AM Date of Service:  2/4/2018 11:49 AM Creation Time:  2/4/2018 11:42 AM    Status:  Signed :  Cele Canseco RN ()     Consult Orders:    1. Care Transition RN/SW IP Consult [[id number]] ordered by Kiah Savage MD at 02/04/18 0825                Care Transition Initial Assessment - RN  Reason For Consult: discharge planning   Met with: Patient and daughter-in-law.    DATA[LS1.1]   Active Problems:    Hyperlipidemia with target LDL less than 130    Chronic constipation    Generalized anxiety disorder    Benign essential hypertension    Hypothyroidism, unspecified type    Shoulder dislocation, left, initial encounter    Closed supracondylar fracture of left humerus    Fall[LS1.2]       Primary Care Clinic Name: [facility]  Primary Care MD Name: Dr. Watson  Contact information and PCP information verified: Yes    ASSESSMENT  Cognitive Status: awake, alert and oriented.       Resources List: Assisted Living, Transitional Care     Lives With: facility resident  Living Arrangements: assisted living     Description of Support System: Supportive, Involved   Who is your support system?: Children, Facility resident(s)/Staff   Support Assessment: Adequate family and caregiver support   Insurance Concerns: No Insurance issues identified      This writer met with pt and daughter-in-law, Anh, introduced self and role.  Discussed discharge planning and Medicare guidelines in regards to home care, TCU and LTC.  The patient lives  a [facility] [phone] ([facility]  She has no services at [facility].  The patient fell and dislocated her shoulder.  She is unable to use her walker for ambulation.  Discussed TCU, patient was provided with Medicare certified nursing home list. Pts choices are as follows [facility] (Main: [phone] Admissions: [phone] Fax: [phone]), [facility] (Phone: [phone] Fax: [phone]) and [facility] Phone: ([phone]) Fax: ([phone]).  TCU referrals are pending.  The patient uses O2 2 liters at night.      PLAN    TCU[LS1.1]      Discharge Planner[LS1.2]   Discharge Plans in progress: TCU  Barriers to discharge plan: Bed availability  Follow up plan: Referral to clinic care coordinator       Entered by:[LS1.1] Cele Canseco 02/04/2018 11:43 AM[LS1.2]           Cele Canseco RN, Care Coordinator [phone][LS1.1]       Revision History        User Key Date/Time User Provider Type Action    > LS1.2 2/4/2018 11:49 AM Cele Canseco RN Case Manager Sign     LS1.1 2/4/2018 11:42 AM Cele Canseco RN Case Manager             Consults by Zi Vallecillo MD at 2/4/2018  8:56 AM     Author:  Zi Vallecillo MD Service:  Orthopedics Author Type:  Physician    Filed:  2/4/2018  8:56 AM Date of Service:  2/4/2018  8:56 AM Creation Time:  2/4/2018  8:49 AM    Status:  Signed :  Zi Vallecillo MD (Physician)     Consult Orders:    1. Orthopedic Surgery IP Consult: Patient to be seen: Routine - within 24 hours; fall, left shoulder fracture dislocation again (reduced in the emergency department); Consultant may enter orders: Yes [[id number]] ordered by Kenn Ayon MD at 02/03/18 5774                [facility] Consultation    Consultation - [facility]  Level of consult: One-time consult to assist in determining a diagnosis and to recommend an appropriate treatment plan    Lara Wills,   1922, MRN [id number]     Admitting Dx: Fall, initial encounter [W19.XXXA]  Shoulder dislocation, left, initial encounter [S43.005A]     PCP: Alejandra Watson, [phone]     Code status:[EP1.1]  No Order[EP1.2]     Extended Emergency Contact Information  Primary Emergency Contact: Harvey Wills   [facility]  Home Phone: [phone]  Mobile Phone: [phone]  Relation: Son  Secondary Emergency Contact: Dayami Porter   [facility]  Home Phone: [phone]  Mobile Phone: [phone]  Relation: Other     Assessment:     1. Left shoulder glenohumeral dislocation with greater tuberosity fracture    Plan:   1. Findings discussed with Lara and her daughter.  At this point, we will treat nonoperatively in shoulder immobilizer.  Will like transition to sling and start pendulums in 2 weeks, but sounds like shoulder right is now is very unstable (would dislocate easily upon putting sling on), so will let heal and scar in for about 2 weeks before we do much   2. Anticipate 3 days in hospital and then transition to TCU -- doubt will be unable to return to independent living[EP1.1]    Active Problems:    Hyperlipidemia with target LDL less than 130    Chronic constipation    Generalized anxiety disorder    Benign essential hypertension    Hypothyroidism, unspecified type    Shoulder dislocation, left, initial encounter    Closed supracondylar fracture of left humerus    Fall[EP1.2]       Chief Complaint  Left shoulder fracture dislocation     HPI  We have been requested by Dr. Regan to evaluate Lara Wills who is a 89 year old year old female for left shoulder fracture dislocation  Was at her senior apartment yesterday when went to bathroom without her walker, fell landing on left side  Noted immediate shoulder pain and presented to ED where workup revealed isolated left glenohumeral dislocation with GT fracture  Underwent closed reduction in ED, but noted to be quite unstable  No antecedent shoulder  issues  Pain has been well controlled     Past Medical History  Anxiety  Hypothyroidism  HTN    Surgical History[EP1.1]  Past Surgical History:   Procedure Laterality Date     APPENDECTOMY  1947     BREAST BIOPSY, RT/LT      right - benign     CATARACT IOL, RT/LT      bilateral     partial thyroidectomy       VITRECTOMY,STRIP EPIRETINAL MEMBRANE  2002[EP1.2]        Social History[EP1.1]  Social History     Social History     Marital status:      Spouse name: N/A     Number of children: N/A     Years of education: N/A     Occupational History     Not on file.     Social History Main Topics     Smoking status: Never Smoker     Smokeless tobacco: Never Used     Alcohol use No     Drug use: No     Sexual activity: No     Other Topics Concern     Not on file     Social History Narrative[EP1.2]       Family History[EP1.1]  Family History   Problem Relation Age of Onset     C.A.D. Mother      Cancer - colorectal Father      Alzheimer Disease Brother      Neurologic Disorder Brother      parkinson     CANCER Daughter      lymphoma[EP1.2]        Allergies:[EP1.1]  Codeine sulfate and Penicillins[EP1.2]      Current Medications:[EP1.1]  Current Facility-Administered Medications   Medication     0.9% sodium chloride infusion     naloxone (NARCAN) injection 0.1-0.4 mg     ondansetron (ZOFRAN-ODT) ODT tab 4 mg    Or     ondansetron (ZOFRAN) injection 4 mg     amLODIPine (NORVASC) tablet 5 mg     brimonidine (ALPHAGAN) 0.2 % ophthalmic solution 1 drop     busPIRone (BUSPAR) tablet 10 mg     docusate sodium (COLACE) capsule 100 mg     DULoxetine (CYMBALTA) EC capsule 60 mg     latanoprost (XALATAN) 0.005 % ophthalmic solution 1 drop     levothyroxine (SYNTHROID/LEVOTHROID) tablet 25 mcg     losartan (COZAAR) tablet 25 mg     metoprolol succinate (TOPROL-XL) 24 hr tablet 25 mg     mirtazapine (REMERON) tablet 15 mg     cholecalciferol (vitamin D3) tablet 2,000 Units     HYDROmorphone (PF) (DILAUDID) injection 0.3-0.5 mg      acetaminophen (TYLENOL) tablet 975 mg     miconazole (MICATIN; MICRO GUARD) 2 % powder     multivitamin (I-SUBHASH) per tablet 1 tablet     Calcium carb-Vitamin D 500 mg [place]-200 units (OSCAL with D;Oyster Shell Calcium) per tablet 1 tablet     oxyCODONE IR (ROXICODONE) half-tab 2.5-5 mg[EP1.2]       Review of Systems:  The Review of Systems is negative other than noted in the HPI    Physical Exam:[EP1.1]  Temp:  [97.3  F (36.3  C)-98.3  F (36.8  C)] 98  F (36.7  C)  Pulse:  [72-78] 73  Heart Rate:  [62-90] 75  Resp:  [18] 18  BP: (113-193)/() 144/67  SpO2:  [82 %-99 %] 92 %[EP1.2]   Sitting up in chair, NAD  Extraocular movements intact  Nonlabored breathing  Left arm in shoulder immobilizer which wasn't removed  Distal sensorimotor exam intact  Wiggles fingers       Pertinent Labs  Lab Results: personally reviewed.[EP1.1]  Lab Results   Component Value Date    WBC 9.6 02/03/2018    HGB 13.1 02/03/2018    HCT 39.5 02/03/2018    MCV 90 02/03/2018     02/03/2018     No results for input(s): INR in the last 168 hours.[EP1.2]    Pertinent Radiology  Radiology Results: images and radiology report reviewed  Recent Results (from the past 24 hour(s))   Shoulder XR, 2 view left    Narrative    LEFT SHOULDER THREE VIEWS  2/3/2018 11:00 AM     HISTORY: Fall, pain.    COMPARISON: None.      Impression    IMPRESSION: There is an anteroinferior glenohumeral joint dislocation  with associated fracture of the humeral head with some fracture  fragments head. There are several fracture fragments projecting just  lateral to the glenoid.     CHET ROSE MD   XR Humerus Left G/E 2 Views    Narrative    HUMERUS LEFT TWO OR MORE VIEWS    2/3/2018 11:00 AM     HISTORY: Fall, pain.    COMPARISON: None.      Impression    IMPRESSION: Fracture dislocation of the glenohumeral joint is present  with a large Hill-Sachs deformity and some fracture fragments  projecting in the joint space. There is also smaller osseous densities  which  "could be due to loose bodies. The mid and distal humerus appear  intact.    CHET ROSE MD   XR Shoulder Left Port G/E 2 Views    Narrative    XR SHOULDER LT PORT G/E 2 VW 2/3/2018 1:29 PM    COMPARISON: Radiographs earlier on the same day.    HISTORY: Postreduction.      Impression    IMPRESSION: Normal alignment of the left glenohumeral joint following  reduction. Multiple calcific fragments are seen adjacent to the  humeral head, may represent fractures related to the dislocation,  versus fractured osteophytes or joint bodies.    GOSIA BUSTOS MD       Attestation:  I have reviewed today's vital signs, notes, medications, labs and imaging.     Zi Vallecillo[EP1.1]       Revision History        User Key Date/Time User Provider Type Action    > EP1.2 2/4/2018  8:56 AM Zi Vallecillo MD Physician Sign     EP1.1 2/4/2018  8:49 AM Zi Vallecillo MD Physician                      Progress Notes - Physician (Notes from 02/03/18 through 02/06/18)      Progress Notes by Jose Ashton at 2/6/2018 12:04 PM     Author:  Jose Ashton Service:  Spiritual Health Author Type:      Filed:  2/6/2018 12:07 PM Date of Service:  2/6/2018 12:04 PM Creation Time:  2/6/2018 12:04 PM    Status:  Signed :  Jose Ashton ()         SPIRITUAL HEALTH SERVICES  SPIRITUAL ASSESSMENT Progress Note  [facility] - Med/Surg    I spoke with Laramanoj WONG in [address] during the morning and she stated that Lara was \"back to herself.\"  She stated that yesterday she had been very crabby and rude to the staff.  She stated that she knew it was probably the medications so was grateful Lara was better today.  Lara stated, during our visit, that  Vaibhav from [facility] had come to see her yesterday \"even though he knows I'm Mosque.\"  She stated she enjoyed their friendship.  MARVIN was very happy that a spot had opened for Lara to return to [facility] in the TCU area, hopefully later today.    Jose Ashton " M.A., [facility]  Staff   [facility]  Office: [phone]  Cell: [phone]  Pager [phone][MC1.1]       Revision History        User Key Date/Time User Provider Type Action    > MC1.1 2/6/2018 12:07 PM Jose Ashton Sign            Progress Notes by Tracy Beckman LICSW at 2/6/2018 10:29 AM     Author:  Tracy Beckman LICSW Service:  (none) Author Type:      Filed:  2/6/2018 10:31 AM Date of Service:  2/6/2018 10:29 AM Creation Time:  2/6/2018 10:29 AM    Status:  Signed :  Tracy Beckman LICSW ()         Name: Lara Wills    MRN#: [id number]    Reason for Hospitalization: Fall, initial encounter [W19.XXXA]  Shoulder dislocation, left, initial encounter [S43.005A]    Discharge Date: 2/6/2018    Patient / Family response to discharge plan: [facility] (Main: [phone] Admissions: [phone] Fax: [phone]). Pt will dc to a LTC care room until a TCU bed is available, family and pt in agreement.     PAS-RR    Per DHS regulation, CTS team completed and submitted PAS-RR to [place] Board on [employer]ging Direct Connect via the Senior LinkAge Line. CTS team advised SNF and they are aware a PAS-RR has been submitted.     CTS team reviewed with pt or health care agent that they may be contacted for a follow up appointment within 10 days of hospital discharge if SNF stay is <30 days. Contact information for Senior LinkAge Line was also provided.     Pt or health care agent verbalized understanding.     PAS-RR # [id number]    Other Providers (Care Coordinator, County Services, PCA services etc): No    Future Appointments: Future Appointments  Date Time Provider Department Center   2/6/2018 1:00 PM Vika Oleary, PT [facility]       Discharge Disposition: transitional care unit    Tracy Beckman MSW, LISA, [facility] [phone][AK1.1]         Revision History        User Key Date/Time User  "Provider Type Action    > AK1.1 2/6/2018 10:31 AM [facility]  Sign            Progress Notes by Fabrizio Elder MD at 2/5/2018  2:45 PM     Author:  Fabrizio Elder MD Service:  Hospitalist Author Type:  Physician    Filed:  2/5/2018  3:17 PM Date of Service:  2/5/2018  2:45 PM Creation Time:  2/5/2018  2:45 PM    Status:  Signed :  Fabrizio Elder MD (Physician)         [facility] Hospitalist Service   Progress Note February 5, 2018         Impression and Plan:       Shoulder dislocation, left, initial encounter- reduced in ER but remains unstable. Ortho consulted and recommend continuing the immobilizer and therapies.      Closed supracondylar fracture of left humerus- ortho consulted. PT assessment today reveals that patient is too weak to walk with a raghav-walker, is going to need TCU after discharge for strengthening.  Pain control improving: has not required hydromorphone in > 24 hours, and last oxycodone was 2.5 mg about 24 hours ago.  Patient says pain control is \"OK\".      Hyperlipidemia with target LDL less than 130- not on home meds.      Chronic constipation - No BM in two days per patient.  Continue home colace dose. Will make Miralax available PRN.      Generalized anxiety disorder- Good control despite hospitalization.  Continue buspirone, duloxetine, and mirtazapine unchanged.      Benign essential hypertension - Remains hypertensive on metoprolol 25 mg BID, amlodipine 5 mg BID, and losartan 25 mg BID.  Will double losartan to 50 mg BID and monitor BP.      Hypothyroidism, unspecified type- TSH mildly elevated, T4 normal in 10/2017.  Appears clinically euthyroid.  Will continue home levothyroxine unchanged.       Hypoxemia - Was receiving home O2 at night prior to admission.  Remains on daytime O2 here in-house at 3 L/min.  Etiology of hypoxemia unclear, but has not pulmonary symptoms and today's lung exam is unremarkable.  Will obtain a one view CXR " "in AM.  Anticipate we may see some basilar atelectasis which should improve as time spent out of bed increases.      Delirium - present this morning.  Mental status has resolved to normal over past several hours.  Etiology is unclear, though suspect it was toxic/metabolic due to opioids.  Opioids are now D/C'd; will observe mental status closely.      FEN: PIV now saline locked.  Prophylaxis: Mechanical.    Dispo: Discussed with PT, patient, and family.  Will require TCU upon discharge for strengthening.  Is OK for discharge when bed available and if delirium doesn't recur.        Interval History:     Pain control is \"OK\" per patient; no opioids necessary in > 24 hours.  Eating and drinking without difficulty  No nausea, vomiting, diarrhea  She does not feel short of breath but is still requiring nasal O2.  No BM in two days, but no abdominal discomfort.    ROS:  CONSTITUTIONAL: NEGATIVE for chills, fever, sweats.  EYES: NEGATIVE for acute visual changes, eye irritation.  ENT/MOUTH: NEGATIVE for nasal congestion, postnasal drainage.  RESP: NEGATIVE for dyspnea, cough, wheeze, or respiratory chest pain   CV: NEGATIVE for chest pain, palpitations, orthopnea, or lower extremity edema.  GI: NEGATIVE for difficulties swallowing, abdominal pain, diarrhea, nausea or vomiting.  : NEGATIVE for dysuria or flank pain.  MUSCULOSKELETAL: NEGATIVE for back pain or any red or swollen joints.  NEURO: NEGATIVE for focal numbness or weakness, syncope, stroke or seizure.  PSYCHIATRIC: Anxiety is well-controlled, no panic or recent change in mood.      Exam:  Temp:  [97.2  F (36.2  C)-99.4  F (37.4  C)] 99.4  F (37.4  C)  Pulse:  [71-97] 85  Heart Rate:  [92] 92  Resp:  [16-18] 18  BP: (120-175)/(59-89) 144/71  SpO2:  [92 %-93 %] 92 %  Body mass index is 32.3 kg/(m^2).     GENERAL: Pleasant woman lying in bed talking to family.  Looks comfortable.  EYES: Eyes grossly normal to inspection, extraocular movements intact  HENT: Nares " patent bilaterally.  Nasal mucosa normal, no discharge.    NECK: Trachea midline, no stridor.    RESP: No accessory muscle use.  Symmetrical breath sounds.  Lungs clear anteriorly on inspiration and expiration.  Expiration not prolonged, no wheeze.  CV: Regular rate and rhythm, non-tachycardic.  Normal S1 S2, grade II/VI mid-systolic murmur heard est at the upper sternal borders, no extra sound.  No lower extremity edema.  ABDOMEN: Soft, non-tender, no guarding.  Liver and spleen not enlarged, no masses palpable.  Bowel sounds positive.  MS: No red or inflamed joints.  SKIN: Warm and dry, no rashes where skin visible.   NEURO: Alert, oriented, conversant.  Cranial nerves II - XII grossly intact.  No gross motor or sensory deficits.  Gait not tested.  PSYCH: Calm, alert, conversant.  Able to articulate logical thoughts, no tangential thoughts, no hallucinations or delusions.  Affect normal.             Data:   All laboratory data reviewed    ROUTINE IP LABS (Last four results)  BMP    Recent Labs  Lab 02/03/18  0950      POTASSIUM 3.5   CHLORIDE 106   EVA 8.4*   CO2 29   BUN 15   CR 0.78   *     CBC    Recent Labs  Lab 02/03/18  0950   WBC 9.6   RBC 4.39   HGB 13.1   HCT 39.5   MCV 90   MCH 29.8   MCHC 33.2   RDW 12.9        INRNo lab results found in last 7 days.       Medications:  I have personally reviewed the patient's current medications as documented in EPIC. Any new additions or changes are documented in the assessment and plan.    Attestation:  Amount of time performed on this hospital visit was 40 minutes, 25 of which were spent in care coordination and counseling.    Fabrizio Elder MD[JM1.1]         Revision History        User Key Date/Time User Provider Type Action    > JM1.1 2/5/2018  3:17 PM Fabrizio Elder MD Physician Sign            Progress Notes by Alejandra Harrison PT at 2/5/2018  2:21 PM     Author:  Alejandra Harrison, PT Service:  (none) Author Type:  Physical Therapist  "   Filed:  2/5/2018  2:21 PM Date of Service:  2/5/2018  2:21 PM Creation Time:  2/5/2018  2:21 PM    Status:  Signed :  Alejandra Harrison, PT (Physical Therapist)         Physical Therapy Evaluation     02/05/18 1400   Quick Adds   Type of Visit Initial PT Evaluation       Present no   Living Environment   Lives With alone   Living Arrangements assisted living   Home Accessibility no concerns   Transportation Available none   Living Environment Comment Pt lives alone at [facility] but pt's son and daughter-in-law are very supportive and check-in with patient in-person almost daily. Pt also wears her life alert around her neck but often takes it off at nighttime which is when the fall leading to this admission occurred.  Pt gets outside assist for cleaning and meals and pt's daughter-in-law (who is a nurse) assists with showers. Pt is independent with dressing.  Has had home therapy in the past (unsure when).   Self-Care   Usual Activity Tolerance moderate   Current Activity Tolerance fair   Regular Exercise yes   Activity/Exercise Type walking   Activity/Exercise/Self-Care Comment Pt reports she is walking \"all the time\" around her apartment and in hallways when able.   Functional Level Prior   Ambulation 1-->assistive equipment   Transferring 1-->assistive equipment   Toileting 0-->independent   Bathing 3-->assistive equipment and person   Dressing 0-->independent   Eating 0-->independent   Communication 0-->understands/communicates without difficulty   Swallowing 0-->swallows foods/liquids without difficulty   Cognition 0 - no cognition issues reported   Fall history within last six months yes   Number of times patient has fallen within last six months 1   Which of the above functional risks had a recent onset or change? ambulation;transferring;fall history   Prior Functional Level Comment See above \"living environment comments\"   General Information   Onset of Illness/Injury or Date of " "Surgery - Date 02/04/18   Referring Physician Kiah Savage MD   Patient/Family Goals Statement pt agreeable to rehab (TCU) stay   Pertinent History of Current Problem (include personal factors and/or comorbidities that impact the POC) 89 year old s/p fall at home resulting in Left shoulder glenohumeral dislocation with greater tuberosity fracture. Per Dr. Vallecillo on 2/4, \"At this point, we will treat nonoperatively in shoulder immobilizer.  Will like transition to sling and start pendulums in 2 weeks, but sounds like shoulder right is now is very unstable (would dislocate easily upon putting sling on), so will let heal and scar in for about 2 weeks before we do much\"   Precautions/Limitations fall precautions   Weight-Bearing Status - LUE (L-shoulder immobilizer on at all times)   General Info Comments Patient received sitting in chair at beside visiting with her daughter and pt requesting to get back to bed, but agreeable ot PT evaluation.   Cognitive Status Examination   Orientation orientation to person, place and time   Level of Consciousness alert   Follows Commands and Answers Questions 100% of the time   Personal Safety and Judgment impaired;at risk behaviors demonstrated  (started to stand x2 despite being instructed to wait)   Pain Assessment   Patient Currently in Pain Yes, see Vital Sign flowsheet   Posture    Posture Forward head position   Range of Motion (ROM)   ROM Quick Adds No deficits were identified   ROM Comment B hip flex, knee flex/ext, DF/PF WFL   Strength   Strength Comments Via observation of mobility, BLEs at least 3/5 throughout   Bed Mobility   Bed Mobility Comments mod-A required for sit to supine and required dependent draw-sheet method to boost up higher in bed and to reposition into neutral alignment for comfort   Transfer Skills   Transfer Comments STS up to R-raghav walker at [facility] for safety; slightly impulsive in doing so   Gait   Gait Comments Pt ambulated 3 feet to " "transfer from chair to bed at [facility] to min-A as pt unsteady and very incoordinated with trying to use R-sided hemiwalker    Balance   Balance Comments fair static standing balance using R-sided hemiwalker at [facility] for safety; poor dynamic balance with transfer, pt unsteady often requiring min-A for safety   Sensory Examination   Sensory Perception Comments BLE light touch grossly intact   Coordination   Coordination no deficits were identified   Muscle Tone   Muscle Tone no deficits were identified   General Therapy Interventions   Planned Therapy Interventions balance training;bed mobility training;gait training;strengthening;transfer training;progressive activity/exercise   Clinical Impression   Criteria for Skilled Therapeutic Intervention yes, treatment indicated   PT Diagnosis decreased independence with functional mobility s/p L-shoulder dislocation and fracture   Influenced by the following impairments pain; poor dynamic balance; L-shoulder immobilizer on at all times so unable to use FWW which is pt's baseline   Functional limitations due to impairments bed mobility, transfers, ambulation    Clinical Presentation Stable/Uncomplicated   Clinical Presentation Rationale clinical judgement   Clinical Decision Making (Complexity) Low complexity   Therapy Frequency` daily   Predicted Duration of Therapy Intervention (days/wks) 3 days   Anticipated Equipment Needs at Discharge (TBD at next level of care)   Anticipated Discharge Disposition Transitional Care Facility   Risk & Benefits of therapy have been explained Yes   Patient, Family & other staff in agreement with plan of care Yes   [facility] [employer] TM \"6 Clicks\"   2016, Trustees of [facility], under license to [employer].  All rights reserved.   6 Clicks Short Forms Basic Mobility Inpatient Short Form   [facility] [employer]  \"6 Clicks\" V.2 Basic Mobility Inpatient Short Form   1. Turning from your back to your side while in a flat bed without " using bedrails? 2 - A Lot   2. Moving from lying on your back to sitting on the side of a flat bed without using bedrails? 2 - A Lot   3. Moving to and from a bed to a chair (including a wheelchair)? 2 - A Lot   4. Standing up from a chair using your arms (e.g., wheelchair, or bedside chair)? 3 - A Little   5. To walk in hospital room? 2 - A Lot   6. Climbing 3-5 steps with a railing? 1 - Total   Basic Mobility Raw Score (Score out of 24.Lower scores equate to lower levels of function) 12   Total Evaluation Time   Total Evaluation Time (Minutes) 10[AC1.1]        Revision History        User Key Date/Time User Provider Type Action    > AC1.1 2/5/2018  2:21 PM Alejandra Harrison, PT Physical Therapist Sign            Progress Notes by Tracy Beckman LICSW at 2/5/2018 11:31 AM     Author:  Tracy Beckman LICSW Service:  (none) Author Type:      Filed:  2/5/2018 11:32 AM Date of Service:  2/5/2018 11:31 AM Creation Time:  2/5/2018 11:31 AM    Status:  Signed :  Tracy Beckman LICSW ()         Reason for Follow up: DC planning    Anticipated discharge needs: This writer called and updated pts sonHarvey regarding dc planning. Pt will need to be off VPM for 24 hours prior to dc. [facility] (Phone: [phone] Fax: [phone]), [facility] (Main: [phone] Admissions: [phone] Fax: [phone]) and [facility] Phone: ([phone]) Fax: ([phone]).     Next steps: Waiting on bed availability, Needs PAS    Tracy Beckman MSW, LISA, [facility] [phone]  Discharge Planner   Discharge Plans in progress: TCU  Barriers to discharge plan: VPM  Follow up plan: CTS to follow       Entered by: Tracy Beckman 02/05/2018 11:31 AM[AK1.1]            Revision History        User Key Date/Time User Provider Type Action    > AK1.1 2/5/2018 11:32 AM Tracy Beckman LICSW  Sign            Progress Notes by  Yeni Beatty RN at 2/4/2018  5:46 PM     Author:  Yeni Beatty RN Service:  (none) Author Type:  Registered Nurse    Filed:  2/4/2018  5:48 PM Date of Service:  2/4/2018  5:46 PM Creation Time:  2/4/2018  5:46 PM    Status:  Signed :  Yeni Beatty RN (Registered Nurse)         Patient was clear minded about one hour ago.  Daughter in law here, patient got confused/restless again.  Pulling off gown, having a difficult time following our direction to sit onto commode.    Page sent to Dr. Arredondo regarding increased confusion.  Gave scheduled tylenol.  Assisted back into bed and family at bedside.  She refuses to have gown on.[LL1.1]       Revision History        User Key Date/Time User Provider Type Action    > LL1.1 2/4/2018  5:48 PM Yeni Beatty RN Registered Nurse Sign            Progress Notes by Yeni Beatty RN at 2/4/2018  4:36 PM     Author:  Yeni Beatty RN Service:  (none) Author Type:  Registered Nurse    Filed:  2/4/2018  4:45 PM Date of Service:  2/4/2018  4:36 PM Creation Time:  2/4/2018  4:36 PM    Status:  Signed :  Yeni Beatty RN (Registered Nurse)         Called into room by physical therapy-patient was found being restless and attempting to remove brace, took oxygen off and was removing gown and pulling on brace. Convinced she was in her apartment and focused on her clothing not being correct and wanting everything off.  Not orientated to place or situation.  Eventually she was assisted back into bed, but due to continued confusion and inability to redirect, nursing assistant 1:1 at bedside for patient safety for now.  Update sent to Dr. Savage.  Patient has had a total of 5 mg oxycodone today and 0.3 mg of IV dilaudid for break through pain.  On scheduled tylenol.[LL1.1]       Revision History        User Key Date/Time User Provider Type Action    > LL1.1 2/4/2018  4:45 PM Yeni Beatty, RN Registered Nurse Sign            Progress Notes by Kiah Savage MD  at 2/4/2018  2:25 PM     Author:  Kiah Savage MD Service:  (none) Author Type:  Fellow    Filed:  2/4/2018  3:01 PM Date of Service:  2/4/2018  2:25 PM Creation Time:  2/4/2018  2:25 PM    Status:  Addendum :  Kiah Savage MD (Fellow)         [facility] Service   Progress Note February 4, 2018         Impression and Plan:     Shoulder dislocation, left, initial encounter- reduced in ER but remains unstable. Ortho consulted and recommend continuing the immobilizer[SW1.1] and[SW1.2] therapies      Closed supracondylar fracture of left humerus- ortho consulted. Pain meds. Uses walker at home. Will continue to work with PT      Hyperlipidemia with target LDL less than 130- not on home meds    Chronic constipation- continue home colace dose. May need to add miralax since requiring opiate    Generalized anxiety disorder- continue home meds    Benign essential hypertension- continue home meds. Mildly hypertensive in ED but, secondary to pain and did not have meds yet today because of fall    Hypothyroidism, unspecified type- continue home synthroid (last tsh/t4 10/31/17, reviewed)   [SW1.1]  On home O2 at night and requiring O2 during the day during hospital course. Likely 2/2 narcotics. No evidence of infection and pt comfortable.[SW1.2]    Prophylaxis: Mechanical[SW1.3]  FEN: PIV[SW1.1], stop fluids today[SW1.2]    Dispo: pt has not made enough progress to be safely discharged home. She is also still requiring IV dilaudid. Will continue working with PT but, anticipate that she will have difficult discharge and need TCU.        Interval History:[SW1.1]     Quite painful still.  Eating and drinking without difficulty  No nausea, vomiting, diarrhea  She does not feel short of breath but is requiring oxygen during the day    Review of systems:   No cough  No fevers[SW1.2]    Exam:  Temp:  [97.3  F (36.3  C)-98.3  F (36.8  C)] 97.8  F (36.6  C)  Pulse:  [72-75] 73  Heart Rate:   [62-80] 75  Resp:  [16-18] 16  BP: (113-175)/(62-91) 134/64  SpO2:  [82 %-94 %] 91 %  Body mass index is 32.3 kg/(m^2).     General:[SW1.1] Awake and alert and in no acute distress.  She is oriented ×3[SW1.2]  CV: Regular rate and rhythm. No murmur noted  Lungs: Clear to auscultation bilaterally  Abd: Soft, nontender, nondistended, bowel sounds normal  Skin/Ext: Warm and dry. Peripheral edema-[SW1.1] none[SW1.2]         Data:   All laboratory data reviewed    ROUTINE IP LABS (Last four results)  BMP  Recent Labs  Lab 02/03/18  0950      POTASSIUM 3.5   CHLORIDE 106   EVA 8.4*   CO2 29   BUN 15   CR 0.78   *     CBC  Recent Labs  Lab 02/03/18  0950   WBC 9.6   RBC 4.39   HGB 13.1   HCT 39.5   MCV 90   MCH 29.8   MCHC 33.2   RDW 12.9        INRNo lab results found in last 7 days.       Medications:  I have personally reviewed the patient's current medications as documented in EPIC. Any new additions or changes are documented in the assessment and plan.    Attestation:[SW1.1]  Amount of time performed on this daily note: 25 minutes.[SW1.2]    Kiah Savage[SW1.1]         Revision History        User Key Date/Time User Provider Type Action    > SW1.3 2/4/2018  3:01 PM Kiah Savage MD Fellow Addend     SW1.2 2/4/2018  3:00 PM Kiah Savage MD Fellow Sign     SW1.1 2/4/2018  2:25 PM Kiah Savage MD Fellow             Progress Notes by Yeni Beatty RN at 2/4/2018  2:53 PM     Author:  Yeni Beatty RN Service:  (none) Author Type:  Registered Nurse    Filed:  2/4/2018  2:58 PM Date of Service:  2/4/2018  2:53 PM Creation Time:  2/4/2018  2:53 PM    Status:  Signed :  Yeni Beatty RN (Registered Nurse)         Pain improved after adjustment of sling around wrist.  Patient up in chair with elbow supported.  Instructed patient several times to not wiggle her shoulder as she has an unstable shoulder and has high potential for re-dislocation.  Orders  received to saline lock IV.  Tylenol as needed, given routinely as able for baseline control.[LL1.1]      Revision History        User Key Date/Time User Provider Type Action    > LL1.1 2/4/2018  2:58 PM Yeni Beatty RN Registered Nurse Sign            Progress Notes by Yeni Beatty RN at 2/3/2018  4:48 PM     Author:  Yeni Beatty RN Service:  (none) Author Type:  Registered Nurse    Filed:  2/3/2018  6:49 PM Date of Service:  2/3/2018  4:48 PM Creation Time:  2/3/2018  4:48 PM    Status:  Addendum :  Yeni Beatty RN (Registered Nurse)         [facility] ADMISSION NOTE    Patient admitted to room 2211 at approximately 1640 via cart from emergency room. Patient was accompanied by son.     Verbal SBAR report received from [place] prior to patient arrival.     Patient trasferred to bed via a[LL1.1]irmat[LL1.2]. Patient alert and oriented X 3. Pain is controlled with current analgesics.  Medication(s) being used: narcotic analgesics including dilaudid. 0-10 Pain Scale: 8. Admission vital signs: Blood pressure 175/89, pulse 78, temperature 98.2  F (36.8  C), temperature source Oral, resp. rate 18, weight 72.6 kg (160 lb), SpO2 92 %, not currently breastfeeding. Patient was oriented to plan of care, call light, bed controls, tv, telephone, bathroom and visiting hours.     The following safety risks were identified during admission: fall. Yellow risk band applied: YES.     Yeni Beatty[LL1.1]       Revision History        User Key Date/Time User Provider Type Action    > LL1.2 2/3/2018  6:49 PM Yeni Beatty RN Registered Nurse Addend     LL1.1 2/3/2018  4:49 PM Yeni Beatty RN Registered Nurse Sign            ED Notes by Arielle Subramanian RN at 2/3/2018  2:45 PM     Author:  Arielle Subramanian RN Service:  (none) Author Type:  Registered Nurse    Filed:  2/3/2018  2:46 PM Date of Service:  2/3/2018  2:45 PM Creation Time:  2/3/2018  2:46 PM    Status:  Signed :  Couper, Arielle K, RN (Registered Nurse)          Mobility is severely diminished with shoulder immobilizer in place, as the patient uses a walker at home.   MD updated. And awaiting orders.[TC1.1]      Revision History        User Key Date/Time User Provider Type Action    > TC1.1 2/3/2018  2:46 PM Arielle Subramanian RN Registered Nurse Sign            ED Notes by Arielle Subramanian RN at 2/3/2018  2:36 PM     Author:  Arielle Subramanian RN Service:  (none) Author Type:  Registered Nurse    Filed:  2/3/2018  2:38 PM Date of Service:  2/3/2018  2:36 PM Creation Time:  2/3/2018  2:38 PM    Status:  Signed :  Arielle Subramanian RN (Registered Nurse)         Arianna Vallecillo RN attempted to get a TCU bed at [facility], there are none available.  Family requested we call [facility] on [address], there was no answer.   MD and family updated on the plan to admit to observation.[TC1.1]      Revision History        User Key Date/Time User Provider Type Action    > TC1.1 2/3/2018  2:38 PM Arielle Subramanian RN Registered Nurse Sign            ED Notes by Angelita Solano RN at 2/3/2018  2:15 PM     Author:  Angelita Solano RN Service:  (none) Author Type:  Registered Nurse    Filed:  2/3/2018  2:15 PM Date of Service:  2/3/2018  2:15 PM Creation Time:  2/3/2018  2:15 PM    Status:  Signed :  Angelita Solano RN (Registered Nurse)         Bed: ED22  Expected date:   Expected time:   Means of arrival:   Comments:  Room 1       Revision History        User Key Date/Time User Provider Type Action    > KS1.1 2/3/2018  2:15 PM Angelita Solano RN Registered Nurse Sign            ED Notes by Arielle Subramanian RN at 2/3/2018  1:37 PM     Author:  Arielle Subramanian RN Service:  (none) Author Type:  Registered Nurse    Filed:  2/3/2018  1:37 PM Date of Service:  2/3/2018  1:37 PM Creation Time:  2/3/2018  1:37 PM    Status:  Signed :  Arielle Subramanian RN (Registered Nurse)         Critical Care Time (RN) Mins:60[TC1.1]     Revision History        User Key Date/Time User Provider Type  Action    > TC1.1 2/3/2018  1:37 PM Arielle Subramanian RN Registered Nurse Sign            ED Notes by Arielle Subramanian RN at 2/3/2018 12:55 PM     Author:  Arielle Subramanian RN Service:  (none) Author Type:  Registered Nurse    Filed:  2/3/2018  1:33 PM Date of Service:  2/3/2018 12:55 PM Creation Time:  2/3/2018  1:33 PM    Status:  Signed :  Arielle Subramanian RN (Registered Nurse)         Pt premedicated with lidocaine per anesthesia, total propofol used 60 mics.[TC1.1]      Revision History        User Key Date/Time User Provider Type Action    > TC1.1 2/3/2018  1:33 PM Arielle Subramanian RN Registered Nurse Sign            ED Notes by Arielle Subramanian RN at 2/3/2018 10:57 AM     Author:  Arielle Subramanian RN Service:  (none) Author Type:  Registered Nurse    Filed:  2/3/2018 10:58 AM Date of Service:  2/3/2018 10:57 AM Creation Time:  2/3/2018 10:58 AM    Status:  Signed :  Arielle Subramanian RN (Registered Nurse)         Back from X/R[TC1.1]     Revision History        User Key Date/Time User Provider Type Action    > TC1.1 2/3/2018 10:58 AM Arielle Subramanian RN Registered Nurse Sign            ED Notes by Arielle Subramanian RN at 2/3/2018  9:42 AM     Author:  Arielle Subramanian RN Service:  (none) Author Type:  Registered Nurse    Filed:  2/3/2018 10:06 AM Date of Service:  2/3/2018  9:42 AM Creation Time:  2/3/2018  9:43 AM    Status:  Addendum :  Arielle Subramanian RN (Registered Nurse)         Pt was incontinent of urine, cleansed and noted rash to the pannicular fold center abdomen and to the groin area, worse on the left side. Family at bedside.[TC1.1] Placed on 02 for room air sats of 91%. EMS administered 25 mics fentanyl intranasally.[TC1.2]     Revision History        User Key Date/Time User Provider Type Action    > TC1.2 2/3/2018 10:06 AM Arielle Subramanian, RN Registered Nurse Addend     TC1.1 2/3/2018  9:43 AM Arielle Subramanian, RN Registered Nurse Sign            ED Notes by Marilynn,  Arielle FALK RN at 2/3/2018  9:57 AM     Author:  Arielle Subramanian RN Service:  (none) Author Type:  Registered Nurse    Filed:  2/3/2018 10:04 AM Date of Service:  2/3/2018  9:57 AM Creation Time:  2/3/2018 10:04 AM    Status:  Signed :  Arielle Subramanian RN (Registered Nurse)         Labs drawn with IV insertion and held.[TC1.1]     Revision History        User Key Date/Time User Provider Type Action    > TC1.1 2/3/2018 10:04 AM Arielle Subramanian RN Registered Nurse Sign            ED Notes by Arielle Subramanian RN at 2/3/2018 10:04 AM     Author:  Arielle Subramanian RN Service:  (none) Author Type:  Registered Nurse    Filed:  2/3/2018 10:04 AM Date of Service:  2/3/2018 10:04 AM Creation Time:  2/3/2018 10:04 AM    Status:  Signed :  Arielle Subramanian RN (Registered Nurse)         MD at bedside.[TC1.1]     Revision History        User Key Date/Time User Provider Type Action    > TC1.1 2/3/2018 10:04 AM Arielle Subramanian RN Registered Nurse Sign            ED Notes by Angelita Solano RN at 2/3/2018  9:30 AM     Author:  Angelita Solano RN Service:  (none) Author Type:  Registered Nurse    Filed:  2/3/2018  9:30 AM Date of Service:  2/3/2018  9:30 AM Creation Time:  2/3/2018  9:30 AM    Status:  Signed :  Angelita Solano RN (Registered Nurse)         Bed: ED22  Expected date:   Expected time:   Means of arrival:   Comments:  ambulance     Revision History        User Key Date/Time User Provider Type Action    > KS1.1 2/3/2018  9:30 AM Angelita Solano RN Registered Nurse Sign                  Procedure Notes     No notes of this type exist for this encounter.         Progress Notes - Therapies (Notes from 02/03/18 through 02/06/18)      Progress Notes by Alejandra Harrison PT at 2/5/2018  2:21 PM     Author:  Alejandra Harrison PT Service:  (none) Author Type:  Physical Therapist    Filed:  2/5/2018  2:21 PM Date of Service:  2/5/2018  2:21 PM Creation Time:  2/5/2018  2:21 PM    Status:  Signed :   "Alejandra Harrison, PT (Physical Therapist)         Physical Therapy Evaluation     02/05/18 1400   Quick Adds   Type of Visit Initial PT Evaluation       Present no   Living Environment   Lives With alone   Living Arrangements assisted living   Home Accessibility no concerns   Transportation Available none   Living Environment Comment Pt lives alone at [facility] but pt's son and daughter-in-law are very supportive and check-in with patient in-person almost daily. Pt also wears her life alert around her neck but often takes it off at nighttime which is when the fall leading to this admission occurred.  Pt gets outside assist for cleaning and meals and pt's daughter-in-law (who is a nurse) assists with showers. Pt is independent with dressing.  Has had home therapy in the past (unsure when).   Self-Care   Usual Activity Tolerance moderate   Current Activity Tolerance fair   Regular Exercise yes   Activity/Exercise Type walking   Activity/Exercise/Self-Care Comment Pt reports she is walking \"all the time\" around her apartment and in hallways when able.   Functional Level Prior   Ambulation 1-->assistive equipment   Transferring 1-->assistive equipment   Toileting 0-->independent   Bathing 3-->assistive equipment and person   Dressing 0-->independent   Eating 0-->independent   Communication 0-->understands/communicates without difficulty   Swallowing 0-->swallows foods/liquids without difficulty   Cognition 0 - no cognition issues reported   Fall history within last six months yes   Number of times patient has fallen within last six months 1   Which of the above functional risks had a recent onset or change? ambulation;transferring;fall history   Prior Functional Level Comment See above \"living environment comments\"   General Information   Onset of Illness/Injury or Date of Surgery - Date 02/04/18   Referring Physician Kiah Savage MD   Patient/Family Goals Statement pt agreeable to rehab " "(TCU) stay   Pertinent History of Current Problem (include personal factors and/or comorbidities that impact the POC) 89 year old s/p fall at home resulting in Left shoulder glenohumeral dislocation with greater tuberosity fracture. Per Dr. Vallecillo on 2/4, \"At this point, we will treat nonoperatively in shoulder immobilizer.  Will like transition to sling and start pendulums in 2 weeks, but sounds like shoulder right is now is very unstable (would dislocate easily upon putting sling on), so will let heal and scar in for about 2 weeks before we do much\"   Precautions/Limitations fall precautions   Weight-Bearing Status - LUE (L-shoulder immobilizer on at all times)   General Info Comments Patient received sitting in chair at beside visiting with her daughter and pt requesting to get back to bed, but agreeable ot PT evaluation.   Cognitive Status Examination   Orientation orientation to person, place and time   Level of Consciousness alert   Follows Commands and Answers Questions 100% of the time   Personal Safety and Judgment impaired;at risk behaviors demonstrated  (started to stand x2 despite being instructed to wait)   Pain Assessment   Patient Currently in Pain Yes, see Vital Sign flowsheet   Posture    Posture Forward head position   Range of Motion (ROM)   ROM Quick Adds No deficits were identified   ROM Comment B hip flex, knee flex/ext, DF/PF WFL   Strength   Strength Comments Via observation of mobility, BLEs at least 3/5 throughout   Bed Mobility   Bed Mobility Comments mod-A required for sit to supine and required dependent draw-sheet method to boost up higher in bed and to reposition into neutral alignment for comfort   Transfer Skills   Transfer Comments STS up to R-raghav walker at CGA for safety; slightly impulsive in doing so   Gait   Gait Comments Pt ambulated 3 feet to transfer from chair to bed at CGA to min-A as pt unsteady and very incoordinated with trying to use R-sided hemiwalker    Balance " "  Balance Comments fair static standing balance using R-sided hemiwalker at [facility] for safety; poor dynamic balance with transfer, pt unsteady often requiring min-A for safety   Sensory Examination   Sensory Perception Comments BLE light touch grossly intact   Coordination   Coordination no deficits were identified   Muscle Tone   Muscle Tone no deficits were identified   General Therapy Interventions   Planned Therapy Interventions balance training;bed mobility training;gait training;strengthening;transfer training;progressive activity/exercise   Clinical Impression   Criteria for Skilled Therapeutic Intervention yes, treatment indicated   PT Diagnosis decreased independence with functional mobility s/p L-shoulder dislocation and fracture   Influenced by the following impairments pain; poor dynamic balance; L-shoulder immobilizer on at all times so unable to use FWW which is pt's baseline   Functional limitations due to impairments bed mobility, transfers, ambulation    Clinical Presentation Stable/Uncomplicated   Clinical Presentation Rationale clinical judgement   Clinical Decision Making (Complexity) Low complexity   Therapy Frequency` daily   Predicted Duration of Therapy Intervention (days/wks) 3 days   Anticipated Equipment Needs at Discharge (TBD at next level of care)   Anticipated Discharge Disposition Transitional Care Facility   Risk & Benefits of therapy have been explained Yes   Patient, Family & other staff in agreement with plan of care Yes   [facility] TM \"6 Clicks\"   2016, Trustees of [facility], under license to [employer].  All rights reserved.   6 Clicks Short Forms Basic Mobility Inpatient Short Form   [facility] [employer]  \"6 Clicks\" V.2 Basic Mobility Inpatient Short Form   1. Turning from your back to your side while in a flat bed without using bedrails? 2 - A Lot   2. Moving from lying on your back to sitting on the side of a flat bed without using bedrails? 2 - A " Lot   3. Moving to and from a bed to a chair (including a wheelchair)? 2 - A Lot   4. Standing up from a chair using your arms (e.g., wheelchair, or bedside chair)? 3 - A Little   5. To walk in hospital room? 2 - A Lot   6. Climbing 3-5 steps with a railing? 1 - Total   Basic Mobility Raw Score (Score out of 24.Lower scores equate to lower levels of function) 12   Total Evaluation Time   Total Evaluation Time (Minutes) 10[AC1.1]        Revision History        User Key Date/Time User Provider Type Action    > AC1.1 2/5/2018  2:21 PM Alejandra Harrison, PT Physical Therapist Sign

## 2018-02-04 PROCEDURE — 25000128 H RX IP 250 OP 636: Performed by: FAMILY MEDICINE

## 2018-02-04 PROCEDURE — 99232 SBSQ HOSP IP/OBS MODERATE 35: CPT | Performed by: FAMILY MEDICINE

## 2018-02-04 PROCEDURE — 25000132 ZZH RX MED GY IP 250 OP 250 PS 637: Mod: GY | Performed by: INTERNAL MEDICINE

## 2018-02-04 PROCEDURE — 12000007 ZZH R&B INTERMEDIATE

## 2018-02-04 PROCEDURE — 25000132 ZZH RX MED GY IP 250 OP 250 PS 637: Mod: GY | Performed by: FAMILY MEDICINE

## 2018-02-04 PROCEDURE — A9270 NON-COVERED ITEM OR SERVICE: HCPCS | Mod: GY | Performed by: FAMILY MEDICINE

## 2018-02-04 PROCEDURE — A9270 NON-COVERED ITEM OR SERVICE: HCPCS | Mod: GY | Performed by: INTERNAL MEDICINE

## 2018-02-04 RX ORDER — HYDROMORPHONE HCL/0.9% NACL/PF 0.2MG/0.2
0.2 SYRINGE (ML) INTRAVENOUS
Status: DISCONTINUED | OUTPATIENT
Start: 2018-02-04 | End: 2018-02-05

## 2018-02-04 RX ORDER — ACETAMINOPHEN 325 MG/1
975 TABLET ORAL EVERY 8 HOURS
Status: DISCONTINUED | OUTPATIENT
Start: 2018-02-04 | End: 2018-02-06 | Stop reason: HOSPADM

## 2018-02-04 RX ADMIN — ACETAMINOPHEN 975 MG: 325 TABLET, FILM COATED ORAL at 08:01

## 2018-02-04 RX ADMIN — DOCUSATE SODIUM 100 MG: 100 CAPSULE, LIQUID FILLED ORAL at 08:05

## 2018-02-04 RX ADMIN — CALCIUM CARBONATE-VITAMIN D TAB 500 MG-200 UNIT 1 TABLET: 500-200 TAB at 17:32

## 2018-02-04 RX ADMIN — Medication 2.5 MG: at 09:50

## 2018-02-04 RX ADMIN — METOPROLOL SUCCINATE 25 MG: 25 TABLET, EXTENDED RELEASE ORAL at 08:05

## 2018-02-04 RX ADMIN — LOSARTAN POTASSIUM 25 MG: 25 TABLET, FILM COATED ORAL at 08:05

## 2018-02-04 RX ADMIN — MIRTAZAPINE 15 MG: 15 TABLET, FILM COATED ORAL at 20:47

## 2018-02-04 RX ADMIN — BUSPIRONE HYDROCHLORIDE 10 MG: 10 TABLET ORAL at 20:42

## 2018-02-04 RX ADMIN — MICONAZOLE NITRATE: 2 POWDER TOPICAL at 20:48

## 2018-02-04 RX ADMIN — LOSARTAN POTASSIUM 25 MG: 25 TABLET, FILM COATED ORAL at 20:43

## 2018-02-04 RX ADMIN — BRIMONIDINE TARTRATE 1 DROP: 2 SOLUTION/ DROPS OPHTHALMIC at 20:46

## 2018-02-04 RX ADMIN — SODIUM CHLORIDE 1000 ML: 9 INJECTION, SOLUTION INTRAVENOUS at 13:19

## 2018-02-04 RX ADMIN — MICONAZOLE NITRATE: 2 POWDER TOPICAL at 08:06

## 2018-02-04 RX ADMIN — DULOXETINE HYDROCHLORIDE 60 MG: 30 CAPSULE, DELAYED RELEASE PELLETS ORAL at 08:01

## 2018-02-04 RX ADMIN — ACETAMINOPHEN 975 MG: 325 TABLET, FILM COATED ORAL at 17:32

## 2018-02-04 RX ADMIN — BUSPIRONE HYDROCHLORIDE 10 MG: 10 TABLET ORAL at 08:09

## 2018-02-04 RX ADMIN — I-VITE, TAB 1000-60-2MG (60/BT) 1 TABLET: TAB at 08:03

## 2018-02-04 RX ADMIN — SODIUM CHLORIDE 1000 ML: 9 INJECTION, SOLUTION INTRAVENOUS at 05:47

## 2018-02-04 RX ADMIN — LEVOTHYROXINE SODIUM 25 MCG: 25 TABLET ORAL at 05:55

## 2018-02-04 RX ADMIN — VITAMIN D, TAB 1000IU (100/BT) 2000 UNITS: 25 TAB at 08:02

## 2018-02-04 RX ADMIN — AMLODIPINE BESYLATE 5 MG: 5 TABLET ORAL at 20:44

## 2018-02-04 RX ADMIN — Medication 0.3 MG: at 10:35

## 2018-02-04 RX ADMIN — BRIMONIDINE TARTRATE 1 DROP: 2 SOLUTION/ DROPS OPHTHALMIC at 08:10

## 2018-02-04 RX ADMIN — Medication 2.5 MG: at 13:19

## 2018-02-04 RX ADMIN — DOCUSATE SODIUM 100 MG: 100 CAPSULE, LIQUID FILLED ORAL at 13:19

## 2018-02-04 RX ADMIN — DOCUSATE SODIUM 100 MG: 100 CAPSULE, LIQUID FILLED ORAL at 20:43

## 2018-02-04 RX ADMIN — METOPROLOL SUCCINATE 25 MG: 25 TABLET, EXTENDED RELEASE ORAL at 20:45

## 2018-02-04 RX ADMIN — LATANOPROST 1 DROP: 50 SOLUTION OPHTHALMIC at 20:46

## 2018-02-04 RX ADMIN — Medication 5 MG: at 03:43

## 2018-02-04 RX ADMIN — CALCIUM CARBONATE-VITAMIN D TAB 500 MG-200 UNIT 1 TABLET: 500-200 TAB at 08:03

## 2018-02-04 RX ADMIN — AMLODIPINE BESYLATE 5 MG: 5 TABLET ORAL at 08:03

## 2018-02-04 NOTE — PROGRESS NOTES
Pain improved after adjustment of sling around wrist.  Patient up in chair with elbow supported.  Instructed patient several times to not wiggle her shoulder as she has an unstable shoulder and has high potential for re-dislocation.  Orders received to saline lock IV.  Tylenol as needed, given routinely as able for baseline control.

## 2018-02-04 NOTE — PROGRESS NOTES
Patient was clear minded about one hour ago.  Daughter in law here, patient got confused/restless again.  Pulling off gown, having a difficult time following our direction to sit onto commode.    Page sent to Dr. Arredondo regarding increased confusion.  Gave scheduled tylenol.  Assisted back into bed and family at bedside.  She refuses to have gown on.

## 2018-02-04 NOTE — PROGRESS NOTES
[facility]   Progress Note February 4, 2018         Impression and Plan:     Shoulder dislocation, left, initial encounter- reduced in ER but remains unstable. Ortho consulted and recommend continuing the immobilizer and therapies      Closed supracondylar fracture of left humerus- ortho consulted. Pain meds. Uses walker at home. Will continue to work with PT      Hyperlipidemia with target LDL less than 130- not on home meds    Chronic constipation- continue home colace dose. May need to add miralax since requiring opiate    Generalized anxiety disorder- continue home meds    Benign essential hypertension- continue home meds. Mildly hypertensive in ED but, secondary to pain and did not have meds yet today because of fall    Hypothyroidism, unspecified type- continue home synthroid (last tsh/t4 10/31/17, reviewed)     On home O2 at night and requiring O2 during the day during hospital course. Likely 2/2 narcotics. No evidence of infection and pt comfortable.    Prophylaxis: Mechanical  FEN: PIV, stop fluids today    Dispo: pt has not made enough progress to be safely discharged home. She is also still requiring IV dilaudid. Will continue working with PT but, anticipate that she will have difficult discharge and need TCU.        Interval History:     Quite painful still.  Eating and drinking without difficulty  No nausea, vomiting, diarrhea  She does not feel short of breath but is requiring oxygen during the day    Review of systems:   No cough  No fevers    Exam:  Temp:  [97.3  F (36.3  C)-98.3  F (36.8  C)] 97.8  F (36.6  C)  Pulse:  [72-75] 73  Heart Rate:  [62-80] 75  Resp:  [16-18] 16  BP: (113-175)/(62-91) 134/64  SpO2:  [82 %-94 %] 91 %  Body mass index is 32.3 kg/(m^2).     General: Awake and alert and in no acute distress.  She is oriented ×3  CV: Regular rate and rhythm. No murmur noted  Lungs: Clear to auscultation bilaterally  Abd: Soft, nontender, nondistended, bowel sounds  normal  Skin/Ext: Warm and dry. Peripheral edema- none         Data:   All laboratory data reviewed    ROUTINE IP LABS (Last four results)  BMP  Recent Labs  Lab 02/03/18  0950      POTASSIUM 3.5   CHLORIDE 106   EVA 8.4*   CO2 29   BUN 15   CR 0.78   *     CBC  Recent Labs  Lab 02/03/18  0950   WBC 9.6   RBC 4.39   HGB 13.1   HCT 39.5   MCV 90   MCH 29.8   MCHC 33.2   RDW 12.9        INRNo lab results found in last 7 days.       Medications:  I have personally reviewed the patient's current medications as documented in EPIC. Any new additions or changes are documented in the assessment and plan.    Attestation:  Amount of time performed on this daily note: 25 minutes.    Kiah Savage

## 2018-02-04 NOTE — PLAN OF CARE
Problem: Fracture Orthopaedic (Adult)  Goal: Signs and Symptoms of Listed Potential Problems Will be Absent, Minimized or Managed (Fracture Orthopaedic)  Signs and symptoms of listed potential problems will be absent, minimized or managed by discharge/transition of care (reference Fracture Orthopaedic (Adult) CPG).   Outcome: Improving  Patient up ambulating in room with STAND BY ASSIST X1  Garcia catheter removed  Oral pain medication ordered/administered  with relief  Ice to left upper arm through out shift  Immobilizer on snug, however writer able to assess areas under immobilizer  And get fingers between body & immobilizer, no open areas  Mya area red, Nystatin powder to area with relief

## 2018-02-04 NOTE — CONSULTS
[facility] Consultation    Consultation - [facility]  Level of consult: One-time consult to assist in determining a diagnosis and to recommend an appropriate treatment plan    Lara Wills,  1922, MRN [id number]     Admitting Dx: Fall, initial encounter [W19.XXXA]  Shoulder dislocation, left, initial encounter [S43.005A]     PCP: Alejandra Watson, [phone]     Code status:  No Order     Extended Emergency Contact Information  Primary Emergency Contact: Harvey Wills   [facility]  Home Phone: [phone]  Mobile Phone: [phone]  Relation: Son  Secondary Emergency Contact: Dayami Porter   [facility]  Home Phone: [phone]  Mobile Phone: [phone]  Relation: Other     Assessment:     1. Left shoulder glenohumeral dislocation with greater tuberosity fracture    Plan:   1. Findings discussed with Lara and her daughter.  At this point, we will treat nonoperatively in shoulder immobilizer.  Will like transition to sling and start pendulums in 2 weeks, but sounds like shoulder right is now is very unstable (would dislocate easily upon putting sling on), so will let heal and scar in for about 2 weeks before we do much   2. Anticipate 3 days in hospital and then transition to TCU -- doubt will be unable to return to independent living    Active Problems:    Hyperlipidemia with target LDL less than 130    Chronic constipation    Generalized anxiety disorder    Benign essential hypertension    Hypothyroidism, unspecified type    Shoulder dislocation, left, initial encounter    Closed supracondylar fracture of left humerus    Fall       Chief Complaint  Left shoulder fracture dislocation     HPI  We have been requested by Dr. Regan to evaluate Lara Wills who is a 89 year old year old female for left shoulder fracture dislocation  Was at her senior apartment yesterday when went to bathroom without her walker, fell landing on left side  Noted immediate shoulder pain  and presented to ED where workup revealed isolated left glenohumeral dislocation with GT fracture  Underwent closed reduction in ED, but noted to be quite unstable  No antecedent shoulder issues  Pain has been well controlled     Past Medical History  Anxiety  Hypothyroidism  HTN    Surgical History  Past Surgical History:   Procedure Laterality Date     APPENDECTOMY  1947     BREAST BIOPSY, RT/LT      right - benign     CATARACT IOL, RT/LT      bilateral     partial thyroidectomy       VITRECTOMY,STRIP EPIRETINAL MEMBRANE  2002        Social History  Social History     Social History     Marital status:      Spouse name: N/A     Number of children: N/A     Years of education: N/A     Occupational History     Not on file.     Social History Main Topics     Smoking status: Never Smoker     Smokeless tobacco: Never Used     Alcohol use No     Drug use: No     Sexual activity: No     Other Topics Concern     Not on file     Social History Narrative       Family History  Family History   Problem Relation Age of Onset     C.A.D. Mother      Cancer - colorectal Father      Alzheimer Disease Brother      Neurologic Disorder Brother      parkinson     CANCER Daughter      lymphoma        Allergies:  Codeine sulfate and Penicillins      Current Medications:  Current Facility-Administered Medications   Medication     0.9% sodium chloride infusion     naloxone (NARCAN) injection 0.1-0.4 mg     ondansetron (ZOFRAN-ODT) ODT tab 4 mg    Or     ondansetron (ZOFRAN) injection 4 mg     amLODIPine (NORVASC) tablet 5 mg     brimonidine (ALPHAGAN) 0.2 % ophthalmic solution 1 drop     busPIRone (BUSPAR) tablet 10 mg     docusate sodium (COLACE) capsule 100 mg     DULoxetine (CYMBALTA) EC capsule 60 mg     latanoprost (XALATAN) 0.005 % ophthalmic solution 1 drop     levothyroxine (SYNTHROID/LEVOTHROID) tablet 25 mcg     losartan (COZAAR) tablet 25 mg     metoprolol succinate (TOPROL-XL) 24 hr tablet 25 mg     mirtazapine (REMERON)  tablet 15 mg     cholecalciferol (vitamin D3) tablet 2,000 Units     HYDROmorphone (PF) (DILAUDID) injection 0.3-0.5 mg     acetaminophen (TYLENOL) tablet 975 mg     miconazole (MICATIN; MICRO GUARD) 2 % powder     multivitamin (I-SUBHASH) per tablet 1 tablet     Calcium carb-Vitamin D 500 mg [place]-200 units (OSCAL with D;[place]yster [place] Calcium) per tablet 1 tablet     oxyCODONE IR (ROXICODONE) half-tab 2.5-5 mg       Review of Systems:  The Review of Systems is negative other than noted in the HPI    Physical Exam:  Temp:  [97.3  F (36.3  C)-98.3  F (36.8  C)] 98  F (36.7  C)  Pulse:  [72-78] 73  Heart Rate:  [62-90] 75  Resp:  [18] 18  BP: (113-193)/() 144/67  SpO2:  [82 %-99 %] 92 %   Sitting up in chair, NAD  Extraocular movements intact  Nonlabored breathing  Left arm in shoulder immobilizer which wasn't removed  Distal sensorimotor exam intact  Wiggles fingers       Pertinent Labs  Lab Results: personally reviewed.  Lab Results   Component Value Date    WBC 9.6 02/03/2018    HGB 13.1 02/03/2018    HCT 39.5 02/03/2018    MCV 90 02/03/2018     02/03/2018     No results for input(s): INR in the last 168 hours.    Pertinent Radiology  Radiology Results: images and radiology report reviewed  Recent Results (from the past 24 hour(s))   Shoulder XR, 2 view left    Narrative    LEFT SHOULDER THREE VIEWS  2/3/2018 11:00 AM     HISTORY: Fall, pain.    COMPARISON: None.      Impression    IMPRESSION: There is an anteroinferior glenohumeral joint dislocation  with associated fracture of the humeral head with some fracture  fragments head. There are several fracture fragments projecting just  lateral to the glenoid.     CHET ROSE MD   XR Humerus Left G/E 2 Views    Narrative    HUMERUS LEFT TWO OR MORE VIEWS    2/3/2018 11:00 AM     HISTORY: Fall, pain.    COMPARISON: None.      Impression    IMPRESSION: Fracture dislocation of the glenohumeral joint is present  with a large Hill-Sachs deformity and some fracture  fragments  projecting in the joint space. There is also smaller osseous densities  which could be due to loose bodies. The mid and distal humerus appear  intact.    CHET ROSE MD   XR Shoulder Left Port G/E 2 Views    Narrative    XR SHOULDER LT PORT G/E 2 VW 2/3/2018 1:29 PM    COMPARISON: Radiographs earlier on the same day.    HISTORY: Postreduction.      Impression    IMPRESSION: Normal alignment of the left glenohumeral joint following  reduction. Multiple calcific fragments are seen adjacent to the  humeral head, may represent fractures related to the dislocation,  versus fractured osteophytes or joint bodies.    GOSIA BUSTOS MD       Attestation:  I have reviewed today's vital signs, notes, medications, labs and imaging.     Zi Vallecillo

## 2018-02-04 NOTE — PLAN OF CARE
Problem: Fracture Orthopaedic (Adult)  Goal: Signs and Symptoms of Listed Potential Problems Will be Absent, Minimized or Managed (Fracture Orthopaedic)  Signs and symptoms of listed potential problems will be absent, minimized or managed by discharge/transition of care (reference Fracture Orthopaedic (Adult) CPG).   Outcome: No Change  Reported increased pain after daughter arrived, stating her left thumb and along side of thumb very painful.  Strap around wrist very tight and swollen.  Strap around wrist re-adjusted slightly.  No relief with re-adjustment, ice applied.  Medicated with oxycodone 2.5 mg po and IV dilaudid afterwards for breakthrough pain.  CMS intact-warm with pink color, fingers and wrist swollen.  Patient reports the pain medications are somewhat effective in pain reduction, but thumb/hand throbbing with pain.  It was reported she gets confused with oxycodone 5 mg dosing.  She appears comfortable and daughter in law reports she has a very low pain tolerance.    Assist of one with Tbelt to transfer to commode and chair.  Gave incentive spirometry and teaching, she is able to get to 750.  Still requires 3 lpm oxygen-desats to low 80 's on RA.  She wears oxygen 2 lpm at night only, per daughter in law.  Alert and orientated.  No signs of confusion today.  Eating regular diet ok and no N/V.

## 2018-02-04 NOTE — PROGRESS NOTES
Called into room by physical therapy-patient was found being restless and attempting to remove brace, took oxygen off and was removing gown and pulling on brace. Convinced she was in her apartment and focused on her clothing not being correct and wanting everything off.  Not orientated to place or situation.  Eventually she was assisted back into bed, but due to continued confusion and inability to redirect, nursing assistant 1:1 at bedside for patient safety for now.  Update sent to Dr. Savage.  Patient has had a total of 5 mg oxycodone today and 0.3 mg of IV dilaudid for break through pain.  On scheduled tylenol.

## 2018-02-04 NOTE — CONSULTS
Care Transition Initial Assessment - RN  Reason For Consult: discharge planning   Met with: Patient and daughter-in-law.    DATA   Active Problems:    Hyperlipidemia with target LDL less than 130    Chronic constipation    Generalized anxiety disorder    Benign essential hypertension    Hypothyroidism, unspecified type    Shoulder dislocation, left, initial encounter    Closed supracondylar fracture of left humerus    Fall       Primary Care Clinic Name: [facility]  Primary Care MD Name: Dr. Watson  Contact information and PCP information verified: Yes    ASSESSMENT  Cognitive Status: awake, alert and oriented.       Resources List: Assisted Living, Transitional Care     Lives With: facility resident  Living Arrangements: assisted living     Description of Support System: Supportive, Involved   Who is your support system?: Children, Facility resident(s)/Staff   Support Assessment: Adequate family and caregiver support   Insurance Concerns: No Insurance issues identified      This writer met with pt and daughter-in-law, Anh, introduced self and role.  Discussed discharge planning and Medicare guidelines in regards to home care, TCU and LTC.  The patient lives a [phone] ([facility]).  She has no services at [facility].  The patient fell and dislocated her shoulder.  She is unable to use her walker for ambulation.  Discussed TCU, patient was provided with Medicare certified nursing home list. Pts choices are as follows [facility] (Main: [phone] Admissions: [phone] Fax: [phone]), [facility] (Phone: [phone] Fax: [phone]) and [facility] Phone: ([phone]) Fax: ([phone]).  TCU referrals are pending.  The patient uses O2 2 liters at night.      PLAN    TCU      Discharge Planner   Discharge Plans in progress: TCU  Barriers to discharge plan: Bed availability  Follow up plan: Referral to clinic care  coordinator       Entered by: Cele Canseco 02/04/2018 11:43 AM           Cele Canseco RN, Care Coordinator [phone]

## 2018-02-05 ENCOUNTER — APPOINTMENT (OUTPATIENT)
Dept: PHYSICAL THERAPY | Facility: CLINIC | Age: 83
DRG: 562 | End: 2018-02-05
Payer: MEDICARE

## 2018-02-05 PROBLEM — R09.02 HYPOXEMIA: Status: ACTIVE | Noted: 2018-02-05

## 2018-02-05 PROBLEM — R41.0 DELIRIUM: Status: ACTIVE | Noted: 2018-02-05

## 2018-02-05 LAB
ALBUMIN UR-MCNC: NEGATIVE MG/DL
APPEARANCE UR: CLEAR
BILIRUB UR QL STRIP: NEGATIVE
COLOR UR AUTO: ABNORMAL
GLUCOSE UR STRIP-MCNC: NEGATIVE MG/DL
HGB UR QL STRIP: NEGATIVE
KETONES UR STRIP-MCNC: NEGATIVE MG/DL
LEUKOCYTE ESTERASE UR QL STRIP: ABNORMAL
MUCOUS THREADS #/AREA URNS LPF: PRESENT /LPF
NITRATE UR QL: NEGATIVE
PH UR STRIP: 6.5 PH (ref 5–7)
RBC #/AREA URNS AUTO: 1 /HPF (ref 0–2)
SOURCE: ABNORMAL
SP GR UR STRIP: 1 (ref 1–1.03)
SQUAMOUS #/AREA URNS AUTO: <1 /HPF (ref 0–1)
UROBILINOGEN UR STRIP-MCNC: NORMAL MG/DL (ref 0–2)
WBC #/AREA URNS AUTO: 1 /HPF (ref 0–2)

## 2018-02-05 PROCEDURE — 99233 SBSQ HOSP IP/OBS HIGH 50: CPT

## 2018-02-05 PROCEDURE — 12000000 ZZH R&B MED SURG/OB

## 2018-02-05 PROCEDURE — 25000132 ZZH RX MED GY IP 250 OP 250 PS 637: Mod: GY | Performed by: INTERNAL MEDICINE

## 2018-02-05 PROCEDURE — 25000132 ZZH RX MED GY IP 250 OP 250 PS 637: Mod: GY

## 2018-02-05 PROCEDURE — 97530 THERAPEUTIC ACTIVITIES: CPT | Mod: GP | Performed by: PHYSICAL THERAPIST

## 2018-02-05 PROCEDURE — 40000193 ZZH STATISTIC PT WARD VISIT: Performed by: PHYSICAL THERAPIST

## 2018-02-05 PROCEDURE — A9270 NON-COVERED ITEM OR SERVICE: HCPCS | Mod: GY | Performed by: FAMILY MEDICINE

## 2018-02-05 PROCEDURE — 97161 PT EVAL LOW COMPLEX 20 MIN: CPT | Mod: GP | Performed by: PHYSICAL THERAPIST

## 2018-02-05 PROCEDURE — A9270 NON-COVERED ITEM OR SERVICE: HCPCS | Mod: GY

## 2018-02-05 PROCEDURE — 81001 URINALYSIS AUTO W/SCOPE: CPT

## 2018-02-05 PROCEDURE — 25000132 ZZH RX MED GY IP 250 OP 250 PS 637: Mod: GY | Performed by: FAMILY MEDICINE

## 2018-02-05 PROCEDURE — A9270 NON-COVERED ITEM OR SERVICE: HCPCS | Mod: GY | Performed by: INTERNAL MEDICINE

## 2018-02-05 RX ORDER — IBUPROFEN 600 MG/1
600 TABLET, FILM COATED ORAL EVERY 6 HOURS PRN
Status: DISCONTINUED | OUTPATIENT
Start: 2018-02-05 | End: 2018-02-06 | Stop reason: HOSPADM

## 2018-02-05 RX ORDER — LOSARTAN POTASSIUM 50 MG/1
50 TABLET ORAL 2 TIMES DAILY
Status: DISCONTINUED | OUTPATIENT
Start: 2018-02-05 | End: 2018-02-06 | Stop reason: HOSPADM

## 2018-02-05 RX ADMIN — METOPROLOL SUCCINATE 25 MG: 25 TABLET, EXTENDED RELEASE ORAL at 09:39

## 2018-02-05 RX ADMIN — MIRTAZAPINE 15 MG: 15 TABLET, FILM COATED ORAL at 20:26

## 2018-02-05 RX ADMIN — LATANOPROST 1 DROP: 50 SOLUTION OPHTHALMIC at 20:29

## 2018-02-05 RX ADMIN — AMLODIPINE BESYLATE 5 MG: 5 TABLET ORAL at 20:24

## 2018-02-05 RX ADMIN — DOCUSATE SODIUM 100 MG: 100 CAPSULE, LIQUID FILLED ORAL at 20:26

## 2018-02-05 RX ADMIN — LOSARTAN POTASSIUM 25 MG: 25 TABLET, FILM COATED ORAL at 09:35

## 2018-02-05 RX ADMIN — BRIMONIDINE TARTRATE 1 DROP: 2 SOLUTION/ DROPS OPHTHALMIC at 20:24

## 2018-02-05 RX ADMIN — ACETAMINOPHEN 975 MG: 325 TABLET, FILM COATED ORAL at 09:35

## 2018-02-05 RX ADMIN — VITAMIN D, TAB 1000IU (100/BT) 2000 UNITS: 25 TAB at 09:43

## 2018-02-05 RX ADMIN — LOSARTAN POTASSIUM 50 MG: 50 TABLET ORAL at 20:25

## 2018-02-05 RX ADMIN — LEVOTHYROXINE SODIUM 25 MCG: 25 TABLET ORAL at 06:11

## 2018-02-05 RX ADMIN — DOCUSATE SODIUM 100 MG: 100 CAPSULE, LIQUID FILLED ORAL at 16:11

## 2018-02-05 RX ADMIN — MICONAZOLE NITRATE: 2 POWDER TOPICAL at 09:50

## 2018-02-05 RX ADMIN — METOPROLOL SUCCINATE 25 MG: 25 TABLET, EXTENDED RELEASE ORAL at 20:25

## 2018-02-05 RX ADMIN — ACETAMINOPHEN 975 MG: 325 TABLET, FILM COATED ORAL at 16:11

## 2018-02-05 RX ADMIN — AMLODIPINE BESYLATE 5 MG: 5 TABLET ORAL at 09:38

## 2018-02-05 RX ADMIN — BUSPIRONE HYDROCHLORIDE 10 MG: 10 TABLET ORAL at 20:26

## 2018-02-05 RX ADMIN — ACETAMINOPHEN 975 MG: 325 TABLET, FILM COATED ORAL at 00:09

## 2018-02-05 RX ADMIN — CALCIUM CARBONATE-VITAMIN D TAB 500 MG-200 UNIT 1 TABLET: 500-200 TAB at 18:05

## 2018-02-05 RX ADMIN — MICONAZOLE NITRATE: 2 POWDER TOPICAL at 20:30

## 2018-02-05 RX ADMIN — BRIMONIDINE TARTRATE 1 DROP: 2 SOLUTION/ DROPS OPHTHALMIC at 09:43

## 2018-02-05 RX ADMIN — DULOXETINE HYDROCHLORIDE 60 MG: 30 CAPSULE, DELAYED RELEASE PELLETS ORAL at 09:40

## 2018-02-05 RX ADMIN — BUSPIRONE HYDROCHLORIDE 10 MG: 10 TABLET ORAL at 09:37

## 2018-02-05 RX ADMIN — DOCUSATE SODIUM 100 MG: 100 CAPSULE, LIQUID FILLED ORAL at 09:40

## 2018-02-05 RX ADMIN — CALCIUM CARBONATE-VITAMIN D TAB 500 MG-200 UNIT 1 TABLET: 500-200 TAB at 09:43

## 2018-02-05 RX ADMIN — I-VITE, TAB 1000-60-2MG (60/BT) 1 TABLET: TAB at 09:43

## 2018-02-05 NOTE — PROGRESS NOTES
[facility]   Progress Note February 5, 2018         Impression and Plan:       Shoulder dislocation, left, initial encounter- reduced in ER but remains unstable. Ortho consulted and recommend continuing the immobilizer and therapies.      Closed supracondylar fracture of left humerus- ortho consulted. PT assessment today reveals that patient is too weak to walk with a raghav-walker, is going to need TCU after discharge for strengthening.  Pain control improving: has not required hydromorphone in > 24 hours, and last oxycodone was 2.5 mg about 24 hours ago.  Patient says pain control is \"OK\".      Hyperlipidemia with target LDL less than 130- not on home meds.      Chronic constipation - No BM in two days per patient.  Continue home colace dose. Will make Miralax available PRN.      Generalized anxiety disorder- Good control despite hospitalization.  Continue buspirone, duloxetine, and mirtazapine unchanged.      Benign essential hypertension - Remains hypertensive on metoprolol 25 mg BID, amlodipine 5 mg BID, and losartan 25 mg BID.  Will double losartan to 50 mg BID and monitor BP.      Hypothyroidism, unspecified type- TSH mildly elevated, T4 normal in 10/2017.  Appears clinically euthyroid.  Will continue home levothyroxine unchanged.       Hypoxemia - Was receiving home O2 at night prior to admission.  Remains on daytime O2 here in-house at 3 L/min.  Etiology of hypoxemia unclear, but has not pulmonary symptoms and today's lung exam is unremarkable.  Will obtain a one view CXR in AM.  Anticipate we may see some basilar atelectasis which should improve as time spent out of bed increases.      Drug-induced encephalopathy due to narcotics - present this morning.  Mental status has resolved to normal over past several hours.  Etiology is unclear, though suspect it was toxic/metabolic due to opioids.  Opioids are now D/C'd; will observe mental status closely.      FEN: PIV now saline " "locked.  Prophylaxis: Mechanical.    Dispo: Discussed with PT, patient, and family.  Will require TCU upon discharge for strengthening.  Is OK for discharge when bed available and if delirium doesn't recur.        Interval History:     Pain control is \"OK\" per patient; no opioids necessary in > 24 hours.  Eating and drinking without difficulty  No nausea, vomiting, diarrhea  She does not feel short of breath but is still requiring nasal O2.  No BM in two days, but no abdominal discomfort.    ROS:  CONSTITUTIONAL: NEGATIVE for chills, fever, sweats.  EYES: NEGATIVE for acute visual changes, eye irritation.  ENT/MOUTH: NEGATIVE for nasal congestion, postnasal drainage.  RESP: NEGATIVE for dyspnea, cough, wheeze, or respiratory chest pain   CV: NEGATIVE for chest pain, palpitations, orthopnea, or lower extremity edema.  GI: NEGATIVE for difficulties swallowing, abdominal pain, diarrhea, nausea or vomiting.  : NEGATIVE for dysuria or flank pain.  MUSCULOSKELETAL: NEGATIVE for back pain or any red or swollen joints.  NEURO: NEGATIVE for focal numbness or weakness, syncope, stroke or seizure.  PSYCHIATRIC: Anxiety is well-controlled, no panic or recent change in mood.      Exam:  Temp:  [97.2  F (36.2  C)-99.4  F (37.4  C)] 99.4  F (37.4  C)  Pulse:  [71-97] 85  Heart Rate:  [92] 92  Resp:  [16-18] 18  BP: (120-175)/(59-89) 144/71  SpO2:  [92 %-93 %] 92 %  Body mass index is 32.3 kg/(m^2).     GENERAL: Pleasant woman lying in bed talking to family.  Looks comfortable.  EYES: Eyes grossly normal to inspection, extraocular movements intact  HENT: Nares patent bilaterally.  Nasal mucosa normal, no discharge.    NECK: Trachea midline, no stridor.    RESP: No accessory muscle use.  Symmetrical breath sounds.  Lungs clear anteriorly on inspiration and expiration.  Expiration not prolonged, no wheeze.  CV: Regular rate and rhythm, non-tachycardic.  Normal S1 S2, grade II/VI mid-systolic murmur heard est at the upper sternal " borders, no extra sound.  No lower extremity edema.  ABDOMEN: Soft, non-tender, no guarding.  Liver and spleen not enlarged, no masses palpable.  Bowel sounds positive.  MS: No red or inflamed joints.  SKIN: Warm and dry, no rashes where skin visible.   NEURO: Alert, oriented, conversant.  Cranial nerves II - XII grossly intact.  No gross motor or sensory deficits.  Gait not tested.  PSYCH: Calm, alert, conversant.  Able to articulate logical thoughts, no tangential thoughts, no hallucinations or delusions.  Affect normal.             Data:   All laboratory data reviewed    ROUTINE IP LABS (Last four results)  BMP    Recent Labs  Lab 02/03/18  0950      POTASSIUM 3.5   CHLORIDE 106   EVA 8.4*   CO2 29   BUN 15   CR 0.78   *     CBC    Recent Labs  Lab 02/03/18  0950   WBC 9.6   RBC 4.39   HGB 13.1   HCT 39.5   MCV 90   MCH 29.8   MCHC 33.2   RDW 12.9        INRNo lab results found in last 7 days.       Medications:  I have personally reviewed the patient's current medications as documented in EPIC. Any new additions or changes are documented in the assessment and plan.    Attestation:  Amount of time performed on this hospital visit was 40 minutes, 25 of which were spent in care coordination and counseling.    Fabrizio Elder MD

## 2018-02-05 NOTE — PLAN OF CARE
Problem: Patient Care Overview  Goal: Plan of Care/Patient Progress Review  Outcome: No Change  Patient alert and oriented times 3 most of time.  Has had periods this shift of confusion and disorientation.  Has been rediredted and reoriented many times.  Video monitoring in room.  Patient does not use call call light appropriately. Occasionally does but mostly doesn't.  Can make need know when asked. Although at times this shift she has thought she was on a ship, or that the ceiling was leaking, or that it was noon and we should be getting lunch.  Patient was given a snack. Patient has been very restless.  Getting out of bed many times for no apparent reason, setting off bed alarm or causing video monitor to set off that alarm.  Frequently has used bedside commode this shift.      Problem: Fracture Orthopaedic (Adult)  Goal: Signs and Symptoms of Listed Potential Problems Will be Absent, Minimized or Managed (Fracture Orthopaedic)  Signs and symptoms of listed potential problems will be absent, minimized or managed by discharge/transition of care (reference Fracture Orthopaedic (Adult) CPG).   Outcome: Improving  Patient wearing immobilizer for left upper extremity fracture. Good pulses.  Patient states her pain is negligible.  Gave her scheduled 975 tylenol when due. Patient redirected many times to call and ask for help.  Video monitoring in room.

## 2018-02-05 NOTE — PLAN OF CARE
Problem: Patient Care Overview  Goal: Plan of Care/Patient Progress Review  PT Cancel: Patient was attempted in morning,  per patient's daughter-in-law, pt had just lied down to try and sleep.  Requested for PT to return at another time

## 2018-02-05 NOTE — PLAN OF CARE
Problem: Patient Care Overview  Goal: Plan of Care/Patient Progress Review  Discharge Planner PT   Patient plan for discharge: TCU  Current status: mod-A for bed mobility; min-A using R-sided raghav-walker for transfers bed <-> chair which was ~3feet; unable to ambulate further distance due to incoordination with AD and decreased balance and safety  Barriers to return to prior living situation: pt well below baseline - was living at [facility]; currently needing assistx1 for all mobility  Recommendations for discharge: TCU  Rationale for recommendations: well below baseline; fall risk; L-shoulder immobilized       Entered by: Alejandra Harrison 02/05/2018 2:29 PM

## 2018-02-05 NOTE — PLAN OF CARE
Problem: Fracture Orthopaedic (Adult)  Goal: Signs and Symptoms of Listed Potential Problems Will be Absent, Minimized or Managed (Fracture Orthopaedic)  Signs and symptoms of listed potential problems will be absent, minimized or managed by discharge/transition of care (reference Fracture Orthopaedic (Adult) CPG).   Outcome: No Change  Patient stated she was in pain this am.  Paged Dr. Arredondo to get alternative non narcotic pain medication.  Narcotic caused patient to become more confused yesterday.  Order given for Ibuprofen 600 mg every 6 hours as needed.

## 2018-02-05 NOTE — PLAN OF CARE
"Problem: Fracture Orthopaedic (Adult)  Goal: Signs and Symptoms of Listed Potential Problems Will be Absent, Minimized or Managed (Fracture Orthopaedic)  Signs and symptoms of listed potential problems will be absent, minimized or managed by discharge/transition of care (reference Fracture Orthopaedic (Adult) CPG).   Outcome: No Change  Went into patient's room to offer her Ibuprofen after obtaining order for the medication.  Patient stated, \"I don't want any Ibuprofen\".  \"You people are nuts!\"  I stated that I thought she was in pain because her shoulder is broken.  She stated, \" No it's not!!\".  I said well we have Ibuprofen if you need it.        "

## 2018-02-05 NOTE — PROGRESS NOTES
"Physical Therapy Evaluation     02/05/18 1400   Quick Adds   Type of Visit Initial PT Evaluation       Present no   Living Environment   Lives With alone   Living Arrangements assisted living   Home Accessibility no concerns   Transportation Available none   Living Environment Comment Pt lives alone at [facility] but pt's son and daughter-in-law are very supportive and check-in with patient in-person almost daily. Pt also wears her life alert around her neck but often takes it off at nighttime which is when the fall leading to this admission occurred.  Pt gets outside assist for cleaning and meals and pt's daughter-in-law (who is a nurse) assists with showers. Pt is independent with dressing.  Has had home therapy in the past (unsure when).   Self-Care   Usual Activity Tolerance moderate   Current Activity Tolerance fair   Regular Exercise yes   Activity/Exercise Type walking   Activity/Exercise/Self-Care Comment Pt reports she is walking \"all the time\" around her apartment and in hallways when able.   Functional Level Prior   Ambulation 1-->assistive equipment   Transferring 1-->assistive equipment   Toileting 0-->independent   Bathing 3-->assistive equipment and person   Dressing 0-->independent   Eating 0-->independent   Communication 0-->understands/communicates without difficulty   Swallowing 0-->swallows foods/liquids without difficulty   Cognition 0 - no cognition issues reported   Fall history within last six months yes   Number of times patient has fallen within last six months 1   Which of the above functional risks had a recent onset or change? ambulation;transferring;fall history   Prior Functional Level Comment See above \"living environment comments\"   General Information   Onset of Illness/Injury or Date of Surgery - Date 02/04/18   Referring Physician Kiah Savage MD   Patient/Family Goals Statement pt agreeable to rehab (TCU) stay   Pertinent History of Current Problem " "(include personal factors and/or comorbidities that impact the POC) 89 year old s/p fall at home resulting in Left shoulder glenohumeral dislocation with greater tuberosity fracture. Per Dr. Vallecillo on 2/4, \"At this point, we will treat nonoperatively in shoulder immobilizer.  Will like transition to sling and start pendulums in 2 weeks, but sounds like shoulder right is now is very unstable (would dislocate easily upon putting sling on), so will let heal and scar in for about 2 weeks before we do much\"   Precautions/Limitations fall precautions   Weight-Bearing Status - LUE (L-shoulder immobilizer on at all times)   General Info Comments Patient received sitting in chair at beside visiting with her daughter and pt requesting to get back to bed, but agreeable ot PT evaluation.   Cognitive Status Examination   Orientation orientation to person, place and time   Level of Consciousness alert   Follows Commands and Answers Questions 100% of the time   Personal Safety and Judgment impaired;at risk behaviors demonstrated  (started to stand x2 despite being instructed to wait)   Pain Assessment   Patient Currently in Pain Yes, see Vital Sign flowsheet   Posture    Posture Forward head position   Range of Motion (ROM)   ROM Quick Adds No deficits were identified   ROM Comment B hip flex, knee flex/ext, DF/PF WFL   Strength   Strength Comments Via observation of mobility, BLEs at least 3/5 throughout   Bed Mobility   Bed Mobility Comments mod-A required for sit to supine and required dependent draw-sheet method to boost up higher in bed and to reposition into neutral alignment for comfort   Transfer Skills   Transfer Comments STS up to R-raghav walker at [facility] for safety; slightly impulsive in doing so   Gait   Gait Comments Pt ambulated 3 feet to transfer from chair to bed at [facility] to min-A as pt unsteady and very incoordinated with trying to use R-sided hemiwalker    Balance   Balance Comments fair static standing balance using " "R-sided hemiwalker at [facility] for safety; poor dynamic balance with transfer, pt unsteady often requiring min-A for safety   Sensory Examination   Sensory Perception Comments BLE light touch grossly intact   Coordination   Coordination no deficits were identified   Muscle Tone   Muscle Tone no deficits were identified   General Therapy Interventions   Planned Therapy Interventions balance training;bed mobility training;gait training;strengthening;transfer training;progressive activity/exercise   Clinical Impression   Criteria for Skilled Therapeutic Intervention yes, treatment indicated   PT Diagnosis decreased independence with functional mobility s/p L-shoulder dislocation and fracture   Influenced by the following impairments pain; poor dynamic balance; L-shoulder immobilizer on at all times so unable to use FWW which is pt's baseline   Functional limitations due to impairments bed mobility, transfers, ambulation    Clinical Presentation Stable/Uncomplicated   Clinical Presentation Rationale clinical judgement   Clinical Decision Making (Complexity) Low complexity   Therapy Frequency` daily   Predicted Duration of Therapy Intervention (days/wks) 3 days   Anticipated Equipment Needs at Discharge (TBD at next level of care)   Anticipated Discharge Disposition Transitional Care Facility   Risk & Benefits of therapy have been explained Yes   Patient, Family & other staff in agreement with plan of care Yes   [facility] [email or web address] \"6 Clicks\"   2016, Trustees of [facility], under license to [employer].  All rights reserved.   6 Clicks Short Forms Basic Mobility Inpatient Short Form   [facility]  \"6 Clicks\" V.2 Basic Mobility Inpatient Short Form   1. Turning from your back to your side while in a flat bed without using bedrails? 2 - A Lot   2. Moving from lying on your back to sitting on the side of a flat bed without using bedrails? 2 - A Lot   3. Moving to and from a bed to a chair (including " a wheelchair)? 2 - A Lot   4. Standing up from a chair using your arms (e.g., wheelchair, or bedside chair)? 3 - A Little   5. To walk in hospital room? 2 - A Lot   6. Climbing 3-5 steps with a railing? 1 - Total   Basic Mobility Raw Score (Score out of 24.Lower scores equate to lower levels of function) 12   Total Evaluation Time   Total Evaluation Time (Minutes) 10

## 2018-02-05 NOTE — PLAN OF CARE
Problem: Fracture Orthopaedic (Adult)  Goal: Signs and Symptoms of Listed Potential Problems Will be Absent, Minimized or Managed (Fracture Orthopaedic)  Signs and symptoms of listed potential problems will be absent, minimized or managed by discharge/transition of care (reference Fracture Orthopaedic (Adult) CPG).   Outcome: Improving  Pt A & O X 3, no confusion noted. Video pt monitoring for safety as pt had been quite confused and impulsive on previous shift. Reports effective pain control with scheduled Tylenol. Immobilizer in place (L) arm. Able to take pills whole independently using (R) arm.

## 2018-02-05 NOTE — PLAN OF CARE
Problem: Patient Care Overview  Goal: Plan of Care/Patient Progress Review  Outcome: Improving  Patient up to chair for meals today. Able to transfer to chair and bedside commode. Slight unsteady with 4 prong walker. Arm in sling. Patient remembers being confused and upset yesterday. Today she feels more clear mentally, her daughter agrees patient is clearing. VPM discontinued at 1344. Bed alarm/tabs alarm continued for safety.

## 2018-02-05 NOTE — PROGRESS NOTES
Reason for Follow up: DC planning    Anticipated discharge needs: This writer called and updated pts sonHarvey regarding dc planning. Pt will need to be off VPM for 24 hours prior to dc. [facility] (Phone: [phone] Fax: [phone]), [facility] (Main: [phone] Admissions: [phone] Fax: [phone]) and [facility] Phone: ([phone]) Fax: ([phone]).     Next steps: Waiting on bed availability, Needs PAS    Tracy MOJICA, [facility], [facility] [phone]  Discharge Planner   Discharge Plans in progress: TCU  Barriers to discharge plan: VPM  Follow up plan: CTS to follow       Entered by: Tracy Beckman 02/05/2018 11:31 AM

## 2018-02-06 ENCOUNTER — APPOINTMENT (OUTPATIENT)
Dept: GENERAL RADIOLOGY | Facility: CLINIC | Age: 83
DRG: 562 | End: 2018-02-06
Payer: MEDICARE

## 2018-02-06 VITALS
TEMPERATURE: 97.6 F | HEART RATE: 85 BPM | SYSTOLIC BLOOD PRESSURE: 156 MMHG | BODY MASS INDEX: 32.3 KG/M2 | OXYGEN SATURATION: 92 % | WEIGHT: 160 LBS | RESPIRATION RATE: 18 BRPM | DIASTOLIC BLOOD PRESSURE: 78 MMHG

## 2018-02-06 PROCEDURE — A9270 NON-COVERED ITEM OR SERVICE: HCPCS | Mod: GY | Performed by: FAMILY MEDICINE

## 2018-02-06 PROCEDURE — 25000132 ZZH RX MED GY IP 250 OP 250 PS 637: Mod: GY

## 2018-02-06 PROCEDURE — A9270 NON-COVERED ITEM OR SERVICE: HCPCS | Mod: GY | Performed by: INTERNAL MEDICINE

## 2018-02-06 PROCEDURE — 25000132 ZZH RX MED GY IP 250 OP 250 PS 637: Mod: GY | Performed by: INTERNAL MEDICINE

## 2018-02-06 PROCEDURE — 25000132 ZZH RX MED GY IP 250 OP 250 PS 637: Mod: GY | Performed by: FAMILY MEDICINE

## 2018-02-06 PROCEDURE — A9270 NON-COVERED ITEM OR SERVICE: HCPCS | Mod: GY

## 2018-02-06 PROCEDURE — 71045 X-RAY EXAM CHEST 1 VIEW: CPT

## 2018-02-06 PROCEDURE — 99239 HOSP IP/OBS DSCHRG MGMT >30: CPT

## 2018-02-06 RX ORDER — ACETAMINOPHEN 325 MG/1
975 TABLET ORAL EVERY 8 HOURS
Qty: 100 TABLET | Refills: 3 | DISCHARGE
Start: 2018-02-06 | End: 2018-01-01

## 2018-02-06 RX ORDER — OXYCODONE HYDROCHLORIDE 5 MG/1
2.5-5 TABLET ORAL EVERY 4 HOURS PRN
Qty: 18 TABLET | Refills: 0 | Status: SHIPPED | OUTPATIENT
Start: 2018-02-06 | End: 2018-02-08

## 2018-02-06 RX ORDER — IBUPROFEN 600 MG/1
600 TABLET, FILM COATED ORAL EVERY 6 HOURS PRN
Qty: 120 TABLET | Refills: 3 | DISCHARGE
Start: 2018-02-06 | End: 2018-01-01

## 2018-02-06 RX ADMIN — ACETAMINOPHEN 975 MG: 325 TABLET, FILM COATED ORAL at 00:26

## 2018-02-06 RX ADMIN — ACETAMINOPHEN 975 MG: 325 TABLET, FILM COATED ORAL at 08:41

## 2018-02-06 RX ADMIN — BRIMONIDINE TARTRATE 1 DROP: 2 SOLUTION/ DROPS OPHTHALMIC at 08:45

## 2018-02-06 RX ADMIN — DOCUSATE SODIUM 100 MG: 100 CAPSULE, LIQUID FILLED ORAL at 08:41

## 2018-02-06 RX ADMIN — LOSARTAN POTASSIUM 50 MG: 50 TABLET ORAL at 08:41

## 2018-02-06 RX ADMIN — VITAMIN D, TAB 1000IU (100/BT) 2000 UNITS: 25 TAB at 08:41

## 2018-02-06 RX ADMIN — AMLODIPINE BESYLATE 5 MG: 5 TABLET ORAL at 08:41

## 2018-02-06 RX ADMIN — CALCIUM CARBONATE-VITAMIN D TAB 500 MG-200 UNIT 1 TABLET: 500-200 TAB at 08:41

## 2018-02-06 RX ADMIN — LEVOTHYROXINE SODIUM 25 MCG: 25 TABLET ORAL at 06:58

## 2018-02-06 RX ADMIN — DULOXETINE HYDROCHLORIDE 60 MG: 30 CAPSULE, DELAYED RELEASE PELLETS ORAL at 08:41

## 2018-02-06 RX ADMIN — I-VITE, TAB 1000-60-2MG (60/BT) 1 TABLET: TAB at 08:41

## 2018-02-06 RX ADMIN — METOPROLOL SUCCINATE 25 MG: 25 TABLET, EXTENDED RELEASE ORAL at 08:41

## 2018-02-06 RX ADMIN — MICONAZOLE NITRATE: 2 POWDER TOPICAL at 08:44

## 2018-02-06 RX ADMIN — BUSPIRONE HYDROCHLORIDE 10 MG: 10 TABLET ORAL at 08:44

## 2018-02-06 NOTE — PROGRESS NOTES
[facility]   Progress Note February 6, 2018         Impression and Plan:       Shoulder dislocation, left, initial encounter- reduced in ER but remains unstable. Ortho consulted and recommend continuing the immobilizer and therapies.      Closed supracondylar fracture of left humerus- ortho consulted. PT assessment today reveals that patient is too weak to walk with a raghav-walker, is going to need TCU after discharge for strengthening.  Pain control improving: has not required hydromorphone in > 24 hours, and last oxycodone was 2.5 mg about 24 hours ago.  Patient says pain control is \"OK\".      Hyperlipidemia with target LDL less than 130- not on home meds.      Chronic constipation - No BM in two days per patient.  Continue home colace dose. Will make Miralax available PRN.      Generalized anxiety disorder- Good control despite hospitalization.  Continue buspirone, duloxetine, and mirtazapine unchanged.      Benign essential hypertension - Remains hypertensive on metoprolol 25 mg BID, amlodipine 5 mg BID, and losartan 25 mg BID.  Will double losartan to 50 mg BID and monitor BP.      Hypothyroidism, unspecified type- TSH mildly elevated, T4 normal in 10/2017.  Appears clinically euthyroid.  Will continue home levothyroxine unchanged.       Hypoxemia - Was receiving home O2 at night prior to admission.  Remains on daytime O2 here in-house now down to 1 L/min.  Etiology of hypoxemia unclear, but has not pulmonary symptoms and today's lung exam is unremarkable.  Will obtain a one view CXR in AM.  Anticipate we may see some basilar atelectasis which should improve as time spent out of bed increases.      Drug-induced encephalopathy due to narcotics - present this morning.  Mental status has resolved to normal over past several hours.  Etiology is unclear, though suspect it was toxic/metabolic due to opioids.  Opioids are now D/C'd; will observe mental status closely.      FEN: PIV now saline " "locked.  Prophylaxis: Mechanical.    Dispo: Discussed with PT, patient, and family.  Will require TCU upon discharge for strengthening.  Is OK for discharge when bed available and if delirium doesn't recur.        Interval History:     Pain control is \"OK\" per patient; no opioids necessary in > 24 hours.  Eating and drinking without difficulty  No nausea, vomiting, diarrhea  She does not feel short of breath but is still requiring nasal O2.  No BM in two days, but no abdominal discomfort.    ROS:  CONSTITUTIONAL: NEGATIVE for chills, fever, sweats.  EYES: NEGATIVE for acute visual changes, eye irritation.  ENT/MOUTH: NEGATIVE for nasal congestion, postnasal drainage.  RESP: NEGATIVE for dyspnea, cough, wheeze, or respiratory chest pain   CV: NEGATIVE for chest pain, palpitations, orthopnea, or lower extremity edema.  GI: NEGATIVE for difficulties swallowing, abdominal pain, diarrhea, nausea or vomiting.  : NEGATIVE for dysuria or flank pain.  MUSCULOSKELETAL: NEGATIVE for back pain or any red or swollen joints.  NEURO: NEGATIVE for focal numbness or weakness, syncope, stroke or seizure.  PSYCHIATRIC: Anxiety is well-controlled, no panic or recent change in mood.      Exam:  Temp:  [97.6  F (36.4  C)-99.8  F (37.7  C)] 97.6  F (36.4  C)  Pulse:  [81-85] 85  Heart Rate:  [92] 92  Resp:  [18] 18  BP: (144-158)/(71-78) 156/78  SpO2:  [91 %-94 %] 92 %  Body mass index is 32.3 kg/(m^2).     GENERAL: Pleasant woman lying in bed talking to family.  Looks comfortable.  EYES: Eyes grossly normal to inspection, extraocular movements intact  HENT: Nares patent bilaterally.  Nasal mucosa normal, no discharge.    NECK: Trachea midline, no stridor.    RESP: No accessory muscle use.  Symmetrical breath sounds.  Lungs clear anteriorly on inspiration and expiration.  Expiration not prolonged, no wheeze.  CV: Regular rate and rhythm, non-tachycardic.  Normal S1 S2, grade II/VI mid-systolic murmur heard est at the upper sternal " borders, no extra sound.  No lower extremity edema.  ABDOMEN: Soft, non-tender, no guarding.  Liver and spleen not enlarged, no masses palpable.  Bowel sounds positive.  MS: No red or inflamed joints.  SKIN: Warm and dry, no rashes where skin visible.   NEURO: Alert, oriented, conversant.  Cranial nerves II - XII grossly intact.  No gross motor or sensory deficits.  Gait not tested.  PSYCH: Calm, alert, conversant.  Able to articulate logical thoughts, no tangential thoughts, no hallucinations or delusions.  Affect normal.             Data:   All laboratory data reviewed    ROUTINE IP LABS (Last four results)  BMP    Recent Labs  Lab 02/03/18  0950      POTASSIUM 3.5   CHLORIDE 106   EVA 8.4*   CO2 29   BUN 15   CR 0.78   *     CBC    Recent Labs  Lab 02/03/18  0950   WBC 9.6   RBC 4.39   HGB 13.1   HCT 39.5   MCV 90   MCH 29.8   MCHC 33.2   RDW 12.9        INRNo lab results found in last 7 days.       Medications:  I have personally reviewed the patient's current medications as documented in EPIC. Any new additions or changes are documented in the assessment and plan.    Attestation:  Amount of time performed on this hospital visit was 40 minutes, 25 of which were spent in care coordination and counseling.    Fabrizio Elder MD

## 2018-02-06 NOTE — PROGRESS NOTES
"SPIRITUAL HEALTH SERVICES  SPIRITUAL ASSESSMENT Progress Note  [facility] - Med/Surg    I spoke with Lara's DIL in hallway 2x during the morning and she stated that Lara was \"back to herself.\"  She stated that yesterday she had been very crabby and rude to the staff.  She stated that she knew it was probably the medications so was grateful Lara was better today.  Lara stated, during our visit, that  Vaibhav from [facility] had come to see her yesterday \"even though he knows I'm S[employer]ism.\"  She stated she enjoyed their friendship.  MARVIN was very happy that a spot had opened for Lara to return to [facility] in the TCU area, hopefully later today.    Jose Ashton M.A., [facility]  Staff [facility]  Office: [phone]  Cell: [phone]  Pager [phone]    "

## 2018-02-06 NOTE — PROGRESS NOTES
[facility]EL[facility] DISCHARGE NOTE    Patient discharged to transitional care unit at 1:40 PM via wheel chair. Accompanied by daughter and staff. Discharge instructions reviewed with patient and daughter, and Kim powell [facility], opportunity offered to ask questions. Prescriptions sent with patient to fill . All belongings sent with patient.    Cal Horne

## 2018-02-06 NOTE — PLAN OF CARE
Problem: Patient Care Overview  Goal: Plan of Care/Patient Progress Review  Physical Therapy Discharge Summary    Reason for therapy discharge:    Discharged to transitional care facility.    Progress towards therapy goal(s). See goals on Care Plan in [facility] electronic health record for goal details.  Goals not met.  Barriers to achieving goals:   discharge from facility.    Therapy recommendation(s):    Continued therapy is recommended.  Rationale/Recommendations:  to improve mobility, decrease burden of care.     Vika Oleary, PT

## 2018-02-06 NOTE — DISCHARGE SUMMARY
[facility]    Discharge Summary  Hospital Medicine    Date of Admission:  2/3/2018  Date of Discharge:  2/6/2018   Discharging Provider: Fabrizio Elder  Date of Service: 2/6/2018      Primary Care     Alejandra Watson  [address]  [facility] [address]      Identification and Chief Compaint: Lara Wills is a 89 year old female who presented on 2/3/2018 with a left shoulder dislocation and left humerus fracture.    Discharge Diagnoses       Closed supracondylar fracture of left humerus    Hyperlipidemia with target LDL less than 130    Chronic constipation    Generalized anxiety disorder    Benign essential hypertension    Hypothyroidism, unspecified type    Shoulder dislocation, left, initial encounter    Fall    Drug-induced encephalopathy due to narcotics    Hypoxemia    Interstitial fibrosis (H)            Discharge Disposition   Discharged to short-term care facility    Discharge Orders     General info for SNF   Length of Stay Estimate: Short Term Care: Estimated # of Days <30  Condition at Discharge: Improving  Level of care:skilled   Rehabilitation Potential: Good  Admission H&P remains valid and up-to-date: Yes  Recent Chemotherapy: N/A  Use Nursing Home Standing Orders: Yes     Mantoux instructions   Give two-step Mantoux (PPD) Per Facility Policy Yes     Reason for your hospital stay   You were in the hospital following fracture and dislocation of your left shoulder and arm.  You're recovering from that now, and at this point will benefit from going to a Transitional Care Unit to continue your rehabilitation from that injury.     Activity - Up with nursing assistance     DNR/DNI     Physical Therapy Adult Consult   Evaluate and treat as clinically indicated.    Reason:  S/P left humerus fracture and shoulder dislocation.  Is globally weak following hospitalization.     Occupational Therapy Adult Consult   Evaluate and treat as clinically indicated.    Reason:  S/P  left humerus fracture and shoulder dislocation.  Is globally weak following hospitalization.     Oxygen - Nasal cannula   Daytime use is PRN use of 2 L/min by nasal cannula to keep O2 sats 90% or greater.  Nocturnal use is 2 L/min with sleep every night.     Advance Diet as Tolerated   Follow this diet upon discharge:     Advance Diet as Tolerated: Regular Diet Adult          Discharge Medications   Current Discharge Medication List      START taking these medications    Details   oxyCODONE IR (ROXICODONE) 5 MG tablet Take 0.5-1 tablets (2.5-5 mg) by mouth every 4 hours as needed for moderate to severe pain  Qty: 18 tablet, Refills: 0    Associated Diagnoses: Closed supracondylar fracture of left humerus with routine healing, subsequent encounter      acetaminophen (TYLENOL) 325 MG tablet Take 3 tablets (975 mg) by mouth every 8 hours  Qty: 100 tablet, Refills: 3    Associated Diagnoses: Closed supracondylar fracture of left humerus with routine healing, subsequent encounter      ibuprofen (ADVIL/MOTRIN) 600 MG tablet Take 1 tablet (600 mg) by mouth every 6 hours as needed for moderate pain  Qty: 120 tablet, Refills: 3    Associated Diagnoses: Closed supracondylar fracture of left humerus with routine healing, subsequent encounter         CONTINUE these medications which have NOT CHANGED    Details   levothyroxine (SYNTHROID/LEVOTHROID) 25 MCG tablet TAKE 1 TABLET (25 MCG) BY MOUTH DAILY  Qty: 90 tablet, Refills: 1    Associated Diagnoses: Hypothyroidism, unspecified type      losartan (COZAAR) 25 MG tablet TAKE 1 TABLET (25 MG) BY MOUTH 2 TIMES DAILY  Qty: 180 tablet, Refills: 1    Associated Diagnoses: Benign essential hypertension      amLODIPine (NORVASC) 5 MG tablet TAKE 1 TABLET BY MOUTH TWICE DAILY  Qty: 180 tablet, Refills: 1    Associated Diagnoses: Benign essential hypertension      brimonidine (ALPHAGAN) 0.2 % ophthalmic solution Place 1 drop Into the left eye 2 times daily      metoprolol (TOPROL-XL) 25  MG 24 hr tablet Take 1 tablet (25 mg) by mouth 2 times daily  Qty: 180 tablet, Refills: 3    Comments: DISCONTINUE order for the 25 mg daily metoprolol xl.  Associated Diagnoses: Benign essential hypertension      busPIRone (BUSPAR) 10 MG tablet Take 10 mg by mouth 2 times daily 0800, 1700      VITAMIN D, CHOLECALCIFEROL, PO Take 2,000 Units by mouth daily At 1200      DULoxetine (CYMBALTA) 60 MG EC capsule Take 60 mg by mouth daily      mirtazapine (REMERON) 15 MG tablet Take 15 mg by mouth At Bedtime      order for DME Oxygen 2 Li/min  at night    Associated Diagnoses: Other secondary hypertension      latanoprost (XALATAN) 0.005 % ophthalmic solution Place 1 drop into both eyes At Bedtime  Qty: 1 Bottle, Refills: 4      multivitamin  with lutein (OCUVITE WITH LTEIN) CAPS Take 1 capsule by mouth daily At 1200      calcium-vitamin D (CALTRATE) 600-400 MG-UNIT per tablet Take 1 tablet by mouth 2 times daily At 1200, and 8 pm      docusate sodium (COLACE) 100 MG capsule Take 100 mg by mouth 3 times daily 0800, 1200, 2000      zolpidem (AMBIEN) 5 MG tablet Take 0.5 tablets (2.5 mg) by mouth nightly as needed for sleep  Qty: 30 tablet, Refills: 0    Comments: Order changed from nightly to PRN nightly per pt request.  Associated Diagnoses: Generalized anxiety disorder         STOP taking these medications       aspirin-acetaminophen-caffeine (EXCEDRIN EXTRA STRENGTH) 250-250-65 MG per tablet Comments:   Reason for Stopping:             Allergies   Allergies   Allergen Reactions     Codeine Sulfate Unknown     Penicillins Unknown       Consultations This Hospital Stay   Consultation during this admission received from:    ORTHOPEDIC SURGERY IP CONSULT  PHYSICAL THERAPY ADULT IP CONSULT  CARE TRANSITION RN/SW IP CONSULT  PHYSICAL THERAPY ADULT IP CONSULT  OCCUPATIONAL THERAPY ADULT IP CONSULT    Significant Results and Procedures   Procedures    Closed reduction of left shoulder dislocation in [facility] ED on  2/3/17.    Data   Results for orders placed or performed during the hospital encounter of 02/03/18   Shoulder XR, 2 view left    Narrative    LEFT SHOULDER THREE VIEWS  2/3/2018 11:00 AM     HISTORY: Fall, pain.    COMPARISON: None.      Impression    IMPRESSION: There is an anteroinferior glenohumeral joint dislocation  with associated fracture of the humeral head with some fracture  fragments head. There are several fracture fragments projecting just  lateral to the glenoid.     CHET ROSE MD   XR Humerus Left G/E 2 Views    Narrative    HUMERUS LEFT TWO OR MORE VIEWS    2/3/2018 11:00 AM     HISTORY: Fall, pain.    COMPARISON: None.      Impression    IMPRESSION: Fracture dislocation of the glenohumeral joint is present  with a large Hill-Sachs deformity and some fracture fragments  projecting in the joint space. There is also smaller osseous densities  which could be due to loose bodies. The mid and distal humerus appear  intact.    CHET ROSE MD   XR Shoulder Left Port G/E 2 Views    Narrative    XR SHOULDER LT PORT G/E 2 VW 2/3/2018 1:29 PM    COMPARISON: Radiographs earlier on the same day.    HISTORY: Postreduction.      Impression    IMPRESSION: Normal alignment of the left glenohumeral joint following  reduction. Multiple calcific fragments are seen adjacent to the  humeral head, may represent fractures related to the dislocation,  versus fractured osteophytes or joint bodies.    GOSIA BUSTOS MD   XR Chest Port 1 View    Narrative    CHEST PORTABLE ONE VIEW February 6, 2018 6:30 AM     HISTORY: Evaluate causes of hypoxemia.     COMPARISON: 12/4/2013.      Impression    IMPRESSION: There is mild stable enlargement of the cardiac  silhouette. There is diffuse interstitial prominence. Atelectasis or  scarring in the lung bases. No consolidative infiltrates. Old fracture  deformity of the left humeral head.    SHANNON SINHA MD       History of Present Illness   The history is obtained from the  patient.     Pt presented to the ED with a fall this morning.  She reports that she stood and walked to the bathroom and simply slipped and fell onto her left shoulder.  She did not hit her head and she had no dizziness or lightheadedness, as well as no chest pain or palpitations prior to falling.  No loss of consciousness.  EMS was called and she was found to have a dislocated shoulder which was reduced in the emergency department and a fracture of the left humerus.  She is now in a shoulder immobilizer and still has some pain but improved.  Patient uses a walker to ambulate and is unable to discharge and will require placement.  She reports that she is otherwise feeling well.    Hospital Course   Lara Wills was admitted on 2/3/2018.  The following problems were addressed during her hospitalization:      Shoulder dislocation, left, initial encounter- Underwent closed reduction in ED, but remains unstable. Ortho consulted and recommend continuing the immobilizer and Physical Therapy.  Immobilizer has been in place.       Closed supracondylar fracture of left humerus - Ortho was consulted, recommended non-operative treatment.  Pain control had been initially difficult to obtain, was on hydromorphone IV and oxycodone x a few doses.  For the last 36 hours of the hospital stay, analgesia has been adequate with scheduled acetaminophen and ibuprofen.  I will send her to TCU with a limited supply of oxycodone in case pain becomes more severe during therapies.        Hyperlipidemia with target LDL less than 130 - Not on home meds.       Chronic constipation - Controlled with home colace and Miralax PRN.       Generalized anxiety disorder- Good control despite hospitalization.  Continued buspirone, duloxetine, and mirtazapine unchanged.       Benign essential hypertension - Remained hypertensive on pre-admission Rx of metoprolol 25 mg BID, amlodipine 5 mg BID, and losartan 25 mg BID.  We doubled losartan to 50 mg BID with  good BP response, will use this dose at discharge.       Hypothyroidism, unspecified type- TSH mildly elevated, T4 normal in 10/2017.  Appears clinically euthyroid.  We continued home levothyroxine unchanged.        Hypoxemia - Was receiving home O2 2 L/min at night prior to admission.  Required daytime O2 here in-house, currently at 2 L/min.  Etiology of hypoxemia appears to be due to some mild interstitial fibrotic lung disease which was seen on CXR 2/6/18, and was also present on a 2013 CXR but appears to have progressed mildly since that time.  Given that fibrotic disease is not going to be reversible, I think she'll have continued O2 needs, perhaps round-the-clock.  O2 orders entered for TCU.       Drug-induced encephalopathy due to narcotics - Emerged the morning of 2/5/17.  Etiology is unclear, though suspect it was toxic/metabolic due to opioids.  Opioids were D/C'd 2/5/18 AM, mental status resolved to normal over the next several hours.      Pending Results   Unresulted Labs Ordered in the Past 30 Days of this Admission     No orders found from 12/5/2017 to 2/4/2018.        ROS:  CONSTITUTIONAL: NEGATIVE for chills, fever, sweats.  EYES: NEGATIVE for acute visual changes, eye irritation.  ENT/MOUTH: NEGATIVE for nasal congestion, postnasal drainage.  RESP: NEGATIVE for dyspnea, cough, wheeze, or respiratory chest pain   CV: NEGATIVE for chest pain, palpitations, orthopnea, or lower extremity edema.  GI: NEGATIVE for difficulties swallowing, abdominal pain, diarrhea, nausea or vomiting.  : NEGATIVE for dysuria or flank pain.  MUSCULOSKELETAL: NEGATIVE for back pain.  NEURO: NEGATIVE for focal numbness or weakness, syncope, stroke or seizure.  PSYCHIATRIC: Anxiety is well-controlled, no panic or recent change in mood.    Physical Exam   Temp:  [97.6  F (36.4  C)-99.8  F (37.7  C)] 97.6  F (36.4  C)  Pulse:  [81-85] 85  Heart Rate:  [92] 92  Resp:  [18] 18  BP: (144-158)/(71-78) 156/78  SpO2:  [91 %-94 %] 92  % on 2 L/min O2.  Vitals:    02/03/18 0935   Weight: 72.6 kg (160 lb)       GENERAL: Pleasant woman seated in recliner talking to family.  Looks comfortable.  EYES: Eyes grossly normal to inspection, extraocular movements intact  HENT: Nares patent bilaterally.  Nasal mucosa normal, no discharge.    NECK: Trachea midline, no stridor.    RESP: No accessory muscle use.  Symmetrical breath sounds.  Lungs clear anteriorly on inspiration and expiration.  Expiration not prolonged, no wheeze.  CV: Regular rate and rhythm, non-tachycardic.  Normal S1 S2, grade II/VI mid-systolic murmur heard est at the upper sternal borders, no extra sound.  No lower extremity edema.  ABDOMEN: Soft, non-tender, no guarding.  Liver and spleen not enlarged, no masses palpable.  Bowel sounds positive.  MS: No red or inflamed joints.  SKIN: Warm and dry, no rashes where skin visible.  Immobilizer not removed to permit left shoulder exam.  NEURO: Alert, oriented, conversant.  Cranial nerves II - XII grossly intact.  No gross motor or sensory deficits.  Gait not tested.  PSYCH: Calm, alert, conversant.  Able to articulate logical thoughts, no tangential thoughts, no hallucinations or delusions.  Affect normal.       The discharge plan was discussed with the patient and her daughter.    Total time on this discharge was 40 minutes.    Fabrizio Elder

## 2018-02-06 NOTE — PROGRESS NOTES
Name: Lara Wills    MRN#: [id number]    Reason for Hospitalization: Fall, initial encounter [W19.XXXA]  Shoulder dislocation, left, initial encounter [S43.005A]    Discharge Date: 2/6/2018    Patient / Family response to discharge plan: Pt is discharging today at 1400 with [facility] transit to [facility] (Main: [phone] Admissions: [phone] Fax: [phone]). Pt will dc to a LTC care room until a TCU bed is available, family and pt in agreement.     PAS-RR    Per DHS regulation, CTS team completed and submitted PAS-RR to [place] Board on Aging Direct Connect via the Senior LinkAge Line. CTS team advised SNF and they are aware a PAS-RR has been submitted.     CTS team reviewed with pt or health care agent that they may be contacted for a follow up appointment within 10 days of hospital discharge if SNF stay is <30 days. Contact information for Senior LinkAge Line was also provided.     Pt or health care agent verbalized understanding.     PAS-RR # [id number]    Other Providers (Care Coordinator, County Services, PCA services etc): No    Future Appointments: Future Appointments  Date Time Provider Department Center   2/6/2018 1:00 PM Vika Oleary, PT [facility]       Discharge Disposition: transitional care unit    Tracy MOJICA, [facility], [facility] [phone]

## 2018-02-06 NOTE — PLAN OF CARE
Problem: Patient Care Overview  Goal: Plan of Care/Patient Progress Review  Outcome: Improving  Patient alert and oriented x4.  Up to commode with assist of 1.  Voiding without difficulty.  Immobilizer in place.  Pain controlled with scheduled Tylenol.  Using call light appropriately.

## 2018-02-07 ENCOUNTER — NURSING HOME VISIT (OUTPATIENT)
Dept: GERIATRICS | Facility: CLINIC | Age: 83
End: 2018-02-07
Payer: COMMERCIAL

## 2018-02-07 ENCOUNTER — CARE COORDINATION (OUTPATIENT)
Dept: CARE COORDINATION | Facility: CLINIC | Age: 83
End: 2018-02-07

## 2018-02-07 VITALS
WEIGHT: 150 LBS | HEART RATE: 87 BPM | RESPIRATION RATE: 16 BRPM | HEIGHT: 59 IN | TEMPERATURE: 97.9 F | SYSTOLIC BLOOD PRESSURE: 161 MMHG | DIASTOLIC BLOOD PRESSURE: 75 MMHG | BODY MASS INDEX: 30.24 KG/M2 | OXYGEN SATURATION: 95 %

## 2018-02-07 DIAGNOSIS — Z53.9 ERRONEOUS ENCOUNTER--DISREGARD: Primary | ICD-10-CM

## 2018-02-07 DIAGNOSIS — Z76.89 HEALTH CARE HOME: ICD-10-CM

## 2018-02-07 NOTE — LETTER
[facility] CARE COORDINATION  [facility]  [address]  [facility] [address]  Phone: [phone]    February 7, 2018    Lara Wills  [address] [address] [place] [address]      Dear Lara,    I am a clinic care coordinator who works with Alejandra Watson MD at [facility]. I wanted to introduce myself and provide you with my contact information so that you can call me with questions or concerns about your health care. Below is a description of clinic care coordination and how I can further assist you.     The clinic care coordinator is a registered nurse and/or  who understand the health care system. The goal of clinic care coordination is to help you manage your health and improve access to the [facility] system in the most efficient manner. The registered nurse can assist you in meeting your health care goals by providing education, coordinating services, and strengthening the communication among your providers. The  can assist you with financial, behavioral, psychosocial, chemical dependency, counseling, and/or psychiatric resources.    Please feel free to contact me at [phone], [phone], with any questions or concerns. We at [facility] are focused on providing you with the highest-quality healthcare experience possible and that all starts with you.     Sincerely,     Milad Eason RN  Clinic Care Coordinator    Enclosed: I have enclosed a copy of a 24 Hour Access Plan. This has helpful phone numbers for you to call when needed. Please keep this in an easy to access place to use as needed.

## 2018-02-07 NOTE — PROGRESS NOTES
[facility]  PRIMARY CARE PROVIDER AND CLINIC:  Alejandra Watson [id number] [facility] [address]  Chief Complaint   Patient presents with     Hospital F/U     Clinic Care Coordination - Initial       HPI:    Lara Wills is a 89 year old  (6/12/1922),admitted to the [facility] from [facility].  Hospital stay 2/3/18 through 2/6/18.  Admitted to this facility for  rehab, medical management and nursing care.  HPI information obtained from: facility chart records, facility staff, patient report and [facility] chart review.  Current issues are:      {FGS DX:941995}    CODE STATUS/ADVANCE DIRECTIVES DISCUSSION:   {CODE STATUS:542273}  Patient's living condition: {LIVES WITH (NURSING HOME):097328}    ALLERGIES:Codeine sulfate and Penicillins  PAST MEDICAL HISTORY:  has no past medical history on file.  PAST SURGICAL HISTORY:  has a past surgical history that includes appendectomy (1947); breast biopsy, rt/lt; partial thyroidectomy; cataract iol, rt/lt; and vitrectomy,strip epiretinal membrane (2002).  FAMILY HISTORY: family history includes Alzheimer Disease in her brother; C.A.D. in her mother; CANCER in her daughter; Cancer - colorectal in her father; Neurologic Disorder in her brother.  SOCIAL HISTORY:  reports that she has never smoked. She has never used smokeless tobacco. She reports that she does not drink alcohol or use illicit drugs.    Post Discharge Medication Reconciliation Status: {ACO Med Rec (Provider):634467}.  Current Outpatient Prescriptions   Medication Sig Dispense Refill     oxyCODONE IR (ROXICODONE) 5 MG tablet Take 0.5-1 tablets (2.5-5 mg) by mouth every 4 hours as needed for moderate to severe pain 18 tablet 0     acetaminophen (TYLENOL) 325 MG tablet Take 3 tablets (975 mg) by mouth every 8 hours 100 tablet 3     ibuprofen (ADVIL/MOTRIN) 600 MG tablet Take 1 tablet (600 mg) by mouth every 6 hours as needed for moderate pain  "120 tablet 3     levothyroxine (SYNTHROID/LEVOTHROID) 25 MCG tablet TAKE 1 TABLET (25 MCG) BY MOUTH DAILY 90 tablet 1     losartan (COZAAR) 25 MG tablet TAKE 1 TABLET (25 MG) BY MOUTH 2 TIMES DAILY 180 tablet 1     amLODIPine (NORVASC) 5 MG tablet TAKE 1 TABLET BY MOUTH TWICE DAILY 180 tablet 1     brimonidine (ALPHAGAN) 0.2 % ophthalmic solution Place 1 drop Into the left eye 2 times daily       zolpidem (AMBIEN) 5 MG tablet Take 0.5 tablets (2.5 mg) by mouth nightly as needed for sleep 30 tablet 0     metoprolol (TOPROL-XL) 25 MG 24 hr tablet Take 1 tablet (25 mg) by mouth 2 times daily 180 tablet 3     busPIRone (BUSPAR) 10 MG tablet Take 10 mg by mouth 2 times daily 0800, 1700       VITAMIN D, CHOLECALCIFEROL, PO Take 2,000 Units by mouth daily At 1200       DULoxetine (CYMBALTA) 60 MG EC capsule Take 60 mg by mouth daily       mirtazapine (REMERON) 15 MG tablet Take 15 mg by mouth At Bedtime       order for DME Oxygen 2 Li/min  at night       latanoprost (XALATAN) 0.005 % ophthalmic solution Place 1 drop into both eyes At Bedtime 1 Bottle 4     multivitamin  with lutein (OCUVITE WITH LTEIN) CAPS Take 1 capsule by mouth daily At 1200       calcium-vitamin D (CALTRATE) 600-400 MG-UNIT per tablet Take 1 tablet by mouth 2 times daily At 1200, and 8 pm       docusate sodium (COLACE) 100 MG capsule Take 100 mg by mouth 3 times daily 0800, 1200, 2000         ROS:  {ROS FGS:810040}    Exam:  /75  Pulse 87  Temp 97.9  F (36.6  C)  Resp 16  Ht 4' 11\" (1.499 m)  Wt 150 lb (68 kg)  SpO2 95%  BMI 30.3 kg/m2  {Nursing home physical exam :654696}    Lab/Diagnostic data:   ***  CBC RESULTS:   Recent Labs   Lab Test  02/03/18   0950  10/31/17   1115   WBC  9.6  7.7   RBC  4.39  4.26   HGB  13.1  12.7   HCT  39.5  38.9   MCV  90  91   MCH  29.8  29.8   MCHC  33.2  32.6   RDW  12.9  13.7   PLT  393  323       Last Basic Metabolic Panel:  Recent Labs   Lab Test  02/03/18   0950  10/31/17   1115   NA  143  144 "   POTASSIUM  3.5  3.6   CHLORIDE  106  107   EVA  8.4*  9.0   CO2  29  31   BUN  15  16   CR  0.78  0.99   GLC  103*  82       Liver Function Studies -   Recent Labs   Lab Test  10/31/17   1115  12/10/16   0128   PROTTOTAL  7.3  6.8   ALBUMIN  3.6  3.7   BILITOTAL  0.4  0.5   ALKPHOS  87  75   AST  19  17   ALT  18  14       TSH   Date Value Ref Range Status   10/31/2017 4.79 (H) 0.40 - 4.00 mU/L Final   11/24/2016 2.96 0.40 - 4.00 mU/L Final   ]    Lab Results   Component Value Date    A1C 5.9 12/05/2013       ASSESSMENT/PLAN:  {FGS DX INITIAL:738575}    Orders:  ***    {FGS TIME SPENT:277035}    Electronically signed by:  Morelia Schmitt CMA

## 2018-02-07 NOTE — LETTER
Health Care Home - Access Care Plan    About Me  Patient Name:  Lara Wills    YOB: 1922  Age:                             89 year old   Belinda MRN:            [id number] Telephone Information:     Home Phone [phone]   Mobile Not on file.       Address:    [address] [place] [address] [address] [place] [id number] Email address:  [email or web address]      Emergency Contact(s)  Name Relationship Lgl Grd Work Phone Home Phone Mobile Phone   1. MARTÍN WILLS Son No  [phone]   2. ERICK FAJARDO Other No  [phone]   3. JULI WILLS Other No  [phone]   4. HARRIET WILLS Relative No  [phone]             Health Maintenance:      My Access Plan  Medical Emergency 911   Questions or concerns during clinic hours Primary Clinic Line, I will call the clinic directly: Primary Clinic: [facility]- [phone]   24 Hour Appointment Line [phone] or  [phone]) (toll free)   24 Hour Nurse Line [phone] (toll free)   Questions or concerns outside clinic hours 24 Hour Appointment Line, I will call the after-hours on-call line:   [facility] [phone] or [phone]) (toll-free)   Preferred Urgent Care Preferred Urgent Care: [facility] [phone]   Preferred Hospital Preferred Hospital: [place], [place]  [phone]   Preferred Pharmacy [facility] [address] [facility] [employer] - [place], [place] - [facility] [address] [facility]     Behavioral Health Crisis Line The National Suicide Prevention Lifeline at 1-322.917.1456 or 911     My Care Team Members  Patient Care Team       Relationship Specialty Notifications Start End    Alejandra Watson MD PCP - General Family Practice  12/11/13     Phone: [phone] Pager: [phone] Fax: [phone] [address] [facility] [address]        My Medical and Care Information  Problem List   Patient Active  Problem List   Diagnosis     Advance Care Planning     Hyperlipidemia with target LDL less than 130     Chronic constipation     OA (osteoarthritis) of knee     Generalized anxiety disorder     Major depressive disorder, single episode, mild (H)     Benign essential hypertension     Hypothyroidism, unspecified type     Shoulder dislocation, left, initial encounter     Closed supracondylar fracture of left humerus     Fall     Delirium     Hypoxemia     Interstitial fibrosis (H)     Health Care Home      Current Medications and Allergies:  See printed Medication Report

## 2018-02-08 ENCOUNTER — NURSING HOME VISIT (OUTPATIENT)
Dept: GERIATRICS | Facility: CLINIC | Age: 83
End: 2018-02-08
Payer: COMMERCIAL

## 2018-02-08 VITALS
TEMPERATURE: 97.8 F | HEART RATE: 80 BPM | RESPIRATION RATE: 18 BRPM | DIASTOLIC BLOOD PRESSURE: 74 MMHG | BODY MASS INDEX: 30.3 KG/M2 | OXYGEN SATURATION: 92 % | WEIGHT: 150 LBS | SYSTOLIC BLOOD PRESSURE: 115 MMHG

## 2018-02-08 DIAGNOSIS — R09.02 HYPOXEMIA: ICD-10-CM
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
## 2018-02-08 DIAGNOSIS — F32.0 MAJOR DEPRESSIVE DISORDER, SINGLE EPISODE, MILD (H): ICD-10-CM

## 2018-02-08 DIAGNOSIS — E03.9 HYPOTHYROIDISM, UNSPECIFIED TYPE: ICD-10-CM

## 2018-02-08 DIAGNOSIS — S42.412D CLOSED SUPRACONDYLAR FRACTURE OF LEFT HUMERUS WITH ROUTINE HEALING, SUBSEQUENT ENCOUNTER: ICD-10-CM

## 2018-02-08 DIAGNOSIS — R41.0 DELIRIUM: ICD-10-CM

## 2018-02-08 DIAGNOSIS — I10 BENIGN ESSENTIAL HYPERTENSION: ICD-10-CM

## 2018-02-08 DIAGNOSIS — F41.1 GENERALIZED ANXIETY DISORDER: ICD-10-CM

## 2018-02-08 DIAGNOSIS — S43.005A SHOULDER DISLOCATION, LEFT, INITIAL ENCOUNTER: Primary | ICD-10-CM

## 2018-02-08 PROCEDURE — 99310 SBSQ NF CARE HIGH MDM 45: CPT | Performed by: NURSE PRACTITIONER

## 2018-02-08 NOTE — LETTER
2/8/2018        RE: Lara Wills  [address]  [facility] [id number]        [facility]  PRIMARY CARE PROVIDER AND CLINIC:  Alejandra Watson [address] / [facility] [id number]  Chief Complaint   Patient presents with     Hospital F/U       HPI:    Lara Wills is a 89 year old  (6/12/1922),admitted to the [facility].  Hospital stay 2/3/18 through 2/6/18.  Admitted to this facility for  rehab, medical management and nursing care.  Current issues are:         Shoulder dislocation, left, initial encounter  Closed supracondylar fracture of left humerus with routine healing, subsequent encounter  Fall, subsequent encounter  Delirium  Major depressive disorder, single episode, mild (H)  Interstitial fibrosis (H)  Hypoxemia  Benign essential hypertension  Generalized anxiety disorder  Hypothyroidism, unspecified type     Patient lives at [facility] living [facility] and stood up to go to the bathroom, slipped and fell on to her L shoulder. She did not hit her head, no dizziness, chest pain, no palpitations. She was found to have L shoulder dislocation and L humerus fracture. Ortho was consulted and conservative management at this time is planned. She will follow up with ortho. Today, she and her daughter in law note pain is pretty well controlled, desire no opiates as this caused some confusion, and she has no yet received any while in TCU. SHe reports no other concerns, starting therapy.     CODE STATUS/ADVANCE DIRECTIVES DISCUSSION:   DNR / DNI  Patient's living condition: lives alone    ALLERGIES:Codeine sulfate and Penicillins  PAST MEDICAL HISTORY:  has no past medical history on file.  PAST SURGICAL HISTORY:  has a past surgical history that includes appendectomy (1947); breast biopsy, rt/lt; partial thyroidectomy; cataract iol, rt/lt; and vitrectomy,strip epiretinal membrane (2002).  FAMILY HISTORY: family  history includes Alzheimer Disease in her brother; C.A.D. in her mother; CANCER in her daughter; Cancer - colorectal in her father; Neurologic Disorder in her brother.  SOCIAL HISTORY:  reports that she has never smoked. She has never used smokeless tobacco. She reports that she does not drink alcohol or use illicit drugs.    Post Discharge Medication Reconciliation Status: discharge medications reconciled and changed, per note/orders (see AVS).  Current Outpatient Prescriptions   Medication Sig Dispense Refill     oxyCODONE IR (ROXICODONE) 5 MG tablet Take 0.5-1 tablets (2.5-5 mg) by mouth every 4 hours as needed for moderate to severe pain 18 tablet 0     acetaminophen (TYLENOL) 325 MG tablet Take 3 tablets (975 mg) by mouth every 8 hours 100 tablet 3     ibuprofen (ADVIL/MOTRIN) 600 MG tablet Take 1 tablet (600 mg) by mouth every 6 hours as needed for moderate pain 120 tablet 3     levothyroxine (SYNTHROID/LEVOTHROID) 25 MCG tablet TAKE 1 TABLET (25 MCG) BY MOUTH DAILY 90 tablet 1     losartan (COZAAR) 25 MG tablet TAKE 1 TABLET (25 MG) BY MOUTH 2 TIMES DAILY 180 tablet 1     amLODIPine (NORVASC) 5 MG tablet TAKE 1 TABLET BY MOUTH TWICE DAILY 180 tablet 1     brimonidine (ALPHAGAN) 0.2 % ophthalmic solution Place 1 drop Into the left eye 2 times daily       zolpidem (AMBIEN) 5 MG tablet Take 0.5 tablets (2.5 mg) by mouth nightly as needed for sleep 30 tablet 0     metoprolol (TOPROL-XL) 25 MG 24 hr tablet Take 1 tablet (25 mg) by mouth 2 times daily 180 tablet 3     busPIRone (BUSPAR) 10 MG tablet Take 10 mg by mouth 2 times daily 0800, 1700       VITAMIN D, CHOLECALCIFEROL, PO Take 2,000 Units by mouth daily At 1200       DULoxetine (CYMBALTA) 60 MG EC capsule Take 60 mg by mouth daily       mirtazapine (REMERON) 15 MG tablet Take 15 mg by mouth At Bedtime       order for DME Oxygen 2 Li/min  at night       latanoprost (XALATAN) 0.005 % ophthalmic solution Place 1 drop into both eyes At Bedtime 1 Bottle 4      multivitamin  with lutein (OCUVITE WITH LTEIN) CAPS Take 1 capsule by mouth daily At 1200       calcium-vitamin D (CALTRATE) 600-400 MG-UNIT per tablet Take 1 tablet by mouth 2 times daily At 1200, and 8 pm       docusate sodium (COLACE) 100 MG capsule Take 100 mg by mouth 3 times daily 0800, 1200, 2000         ROS:  4 point ROS including Respiratory, CV, GI and , other than that noted in the HPI,  is negative    Exam:  /74  Pulse 80  Temp 97.8  F (36.6  C)  Resp 18  Wt 150 lb (68 kg)  SpO2 92%  BMI 30.3 kg/m2  GENERAL APPEARANCE:  Alert, in no acute distress  HEAD:  Normal, normocephalic, atraumatic  EYE EXAM: normal external eye, conjunctiva, lids, AURE  NECK EXAM: supple, no JVD  CHEST/RESP:  respiratory effort  normal, lung sounds CTA , no respiratory distress  CV:  Rate reg, rhythm reg, no murmur, trace peripheral edema   M/S:   extremities abnormal, gait abnormal-difficulty walking due to NWB to L UE and ususally uses rolling walker , normal muscle tone, and range of motion limited to L UE  NEUROLOGIC EXAM: Normal gross motor movement, tone and coordination. No tremor.  Cranial nerves 2-12 are normal tested and grossly at patient's baseline  PSYCH:  Alert and oriented to self and surroundings with forgetfulness , affect pleasant , judgement appropriate       Lab/Diagnostic data:     CBC RESULTS:   Recent Labs   Lab Test  02/03/18   0950  10/31/17   1115   WBC  9.6  7.7   RBC  4.39  4.26   HGB  13.1  12.7   HCT  39.5  38.9   MCV  90  91   MCH  29.8  29.8   MCHC  33.2  32.6   RDW  12.9  13.7   PLT  393  323       Last Basic Metabolic Panel:  Recent Labs   Lab Test  02/03/18   0950  10/31/17   1115   NA  143  144   POTASSIUM  3.5  3.6   CHLORIDE  106  107   EVA  8.4*  9.0   CO2  29  31   BUN  15  16   CR  0.78  0.99   GLC  103*  82       Liver Function Studies -   Recent Labs   Lab Test  10/31/17   1115  12/10/16   0128   PROTTOTAL  7.3  6.8   ALBUMIN  3.6  3.7   BILITOTAL  0.4  0.5   ALKPHOS  87   75   AST  19  17   ALT  18  14       TSH   Date Value Ref Range Status   10/31/2017 4.79 (H) 0.40 - 4.00 mU/L Final   11/24/2016 2.96 0.40 - 4.00 mU/L Final   ]    Lab Results   Component Value Date    A1C 5.9 12/05/2013       ASSESSMENT/PLAN:  Shoulder dislocation, left, initial encounter  Closed supracondylar fracture of left humerus with routine healing, subsequent encounter  Wearing immobilizer now, has follow up with ortho in about 2 weeks. Conservative management planned. Shoulder dislocation was reduced in ED, humerus fracture stabilized and goal is to continue with conservative management at this time. Appreciate recommendations of ortho    Fall, subsequent encounter  Likely mechanical in nature, per her report of the incident. Starting therapy to improve strength and mobility.     Delirium  Did have some increased confusion and agitation while hospitalized, thought to be due to opiates. Confusion cleared with discontinuation of opioids. Daughter in law would prefer no opiates be used. Patient reports minimal pain at rest, has not used prescribed oxycodone over last several days of admission to TCU. Will discontinue     Major depressive disorder, single episode, mild (H)  On buspirone, duloxetine, mirtazapine. Monitor, The current medical regimen is effective;  continue present plan and medications.     Interstitial fibrosis (H)  Hypoxemia  previously used  O2 2 lpm at night, did require O2 during the day at hospital. Will monitor, may need ongoing daytime O2 now. Will monitor and wean off O2 during the day if possible.     Benign essential hypertension  On losartan at increased dose, amlodipine, metoprolol. Will monitor, may need titration of dosing.   BP Readings from Last 6 Encounters:   02/08/18 115/74   02/07/18 161/75   02/06/18 156/78   11/28/17 128/74   11/17/17 136/65   10/31/17 142/68        Generalized anxiety disorder  Appears to have good control, will monitor.     Hypothyroidism, unspecified  type  TSH   Date Value Ref Range Status   10/31/2017 4.79 (H) 0.40 - 4.00 mU/L Final   ] TSH slightly elevated but without symptoms. The current medical regimen is effective;  continue present plan and medications.   Noted goal in ambrose pts 4-5 and  New studies have shown there is no  difference in hypothyroid symptoms or tiredness scores after one year of treatment if TSH is between the upper reference limit and 9.9 mU/L.  (Calvin DJ, Naldo N, Josias PM, et al. Thyroid Hormone Therapy for Older Adults with Subclinical Hypothyroidism. N Engl J Med 2017. Treatment with levothyroxine provides no symptomatic benefit in older adults with subclinical hypothyroidism (April 2017 Up to Date)        Orders:  1. DC all oxycodone orders- caused delirium    Information reviewed:  Medications, vital signs, orders, nursing notes, problem list, hospital information. Total time spent with patient visit was 39 minutes including patient visit, review of past records and discussion with daughter in law. Greater than 50% of total time spent with counseling and coordinating care, regarding multiple medical comorbid conditions.    Electronically signed by:  Janneth Alba CNP   [facility]  [phone] cell                       Sincerely,        ROSANGELA Kirk CNP

## 2018-02-08 NOTE — PROGRESS NOTES
[facility]  PRIMARY CARE PROVIDER AND CLINIC:  Walter Alejandra LESLY [address] [address][1] / [facility] [address]  Chief Complaint   Patient presents with     Hospital F/U       HPI:    Lara Wills is a 89 year old  (6/12/1922),admitted to the [facility].  Hospital stay 2/3/18 through 2/6/18.  Admitted to this facility for  rehab, medical management and nursing care.  Current issues are:         Shoulder dislocation, left, initial encounter  Closed supracondylar fracture of left humerus with routine healing, subsequent encounter  Fall, subsequent encounter  Delirium  Major depressive disorder, single episode, mild (H)  Interstitial fibrosis (H)  Hypoxemia  Benign essential hypertension  Generalized anxiety disorder  Hypothyroidism, unspecified type     Patient lives at [facility] living [facility] and stood up to go to the bathroom, slipped and fell on to her L shoulder. She did not hit her head, no dizziness, chest pain, no palpitations. She was found to have L shoulder dislocation and L humerus fracture. Ortho was consulted and conservative management at this time is planned. She will follow up with ortho. Today, she and her daughter in law note pain is pretty well controlled, desire no opiates as this caused some confusion, and she has no yet received any while in TCU. SHe reports no other concerns, starting therapy.     CODE STATUS/ADVANCE DIRECTIVES DISCUSSION:   DNR / DNI  Patient's living condition: lives alone    ALLERGIES:Codeine sulfate and Penicillins  PAST MEDICAL HISTORY:  has no past medical history on file.  PAST SURGICAL HISTORY:  has a past surgical history that includes appendectomy (1947); breast biopsy, rt/lt; partial thyroidectomy; cataract iol, rt/lt; and vitrectomy,strip epiretinal membrane (2002).  FAMILY HISTORY: family history includes Alzheimer Disease in her brother; C.A.D. in her mother; CANCER in her daughter;  Cancer - colorectal in her father; Neurologic Disorder in her brother.  SOCIAL HISTORY:  reports that she has never smoked. She has never used smokeless tobacco. She reports that she does not drink alcohol or use illicit drugs.    Post Discharge Medication Reconciliation Status: discharge medications reconciled and changed, per note/orders (see AVS).  Current Outpatient Prescriptions   Medication Sig Dispense Refill     oxyCODONE IR (ROXICODONE) 5 MG tablet Take 0.5-1 tablets (2.5-5 mg) by mouth every 4 hours as needed for moderate to severe pain 18 tablet 0     acetaminophen (TYLENOL) 325 MG tablet Take 3 tablets (975 mg) by mouth every 8 hours 100 tablet 3     ibuprofen (ADVIL/MOTRIN) 600 MG tablet Take 1 tablet (600 mg) by mouth every 6 hours as needed for moderate pain 120 tablet 3     levothyroxine (SYNTHROID/LEVOTHROID) 25 MCG tablet TAKE 1 TABLET (25 MCG) BY MOUTH DAILY 90 tablet 1     losartan (COZAAR) 25 MG tablet TAKE 1 TABLET (25 MG) BY MOUTH 2 TIMES DAILY 180 tablet 1     amLODIPine (NORVASC) 5 MG tablet TAKE 1 TABLET BY MOUTH TWICE DAILY 180 tablet 1     brimonidine (ALPHAGAN) 0.2 % ophthalmic solution Place 1 drop Into the left eye 2 times daily       zolpidem (AMBIEN) 5 MG tablet Take 0.5 tablets (2.5 mg) by mouth nightly as needed for sleep 30 tablet 0     metoprolol (TOPROL-XL) 25 MG 24 hr tablet Take 1 tablet (25 mg) by mouth 2 times daily 180 tablet 3     busPIRone (BUSPAR) 10 MG tablet Take 10 mg by mouth 2 times daily 0800, 1700       VITAMIN D, CHOLECALCIFEROL, PO Take 2,000 Units by mouth daily At 1200       DULoxetine (CYMBALTA) 60 MG EC capsule Take 60 mg by mouth daily       mirtazapine (REMERON) 15 MG tablet Take 15 mg by mouth At Bedtime       order for DME Oxygen 2 Li/min  at night       latanoprost (XALATAN) 0.005 % ophthalmic solution Place 1 drop into both eyes At Bedtime 1 Bottle 4     multivitamin  with lutein (OCUVITE WITH LTEIN) CAPS Take 1 capsule by mouth daily At 1200        calcium-vitamin D (CALTRATE) 600-400 MG-UNIT per tablet Take 1 tablet by mouth 2 times daily At 1200, and 8 pm       docusate sodium (COLACE) 100 MG capsule Take 100 mg by mouth 3 times daily 0800, 1200, 2000         ROS:  4 point ROS including Respiratory, CV, GI and , other than that noted in the HPI,  is negative    Exam:  /74  Pulse 80  Temp 97.8  F (36.6  C)  Resp 18  Wt 150 lb (68 kg)  SpO2 92%  BMI 30.3 kg/m2  GENERAL APPEARANCE:  Alert, in no acute distress  HEAD:  Normal, normocephalic, atraumatic  EYE EXAM: normal external eye, conjunctiva, lids, AURE  NECK EXAM: supple, no JVD  CHEST/RESP:  respiratory effort  normal, lung sounds CTA , no respiratory distress  CV:  Rate reg, rhythm reg, no murmur, trace peripheral edema   M/S:   extremities abnormal, gait abnormal-difficulty walking due to NWB to L UE and ususally uses rolling walker , normal muscle tone, and range of motion limited to L UE  NEUROLOGIC EXAM: Normal gross motor movement, tone and coordination. No tremor.  Cranial nerves 2-12 are normal tested and grossly at patient's baseline  PSYCH:  Alert and oriented to self and surroundings with forgetfulness , affect pleasant , judgement appropriate       Lab/Diagnostic data:     CBC RESULTS:   Recent Labs   Lab Test  02/03/18   0950  10/31/17   1115   WBC  9.6  7.7   RBC  4.39  4.26   HGB  13.1  12.7   HCT  39.5  38.9   MCV  90  91   MCH  29.8  29.8   MCHC  33.2  32.6   RDW  12.9  13.7   PLT  393  323       Last Basic Metabolic Panel:  Recent Labs   Lab Test  02/03/18   0950  10/31/17   1115   NA  143  144   POTASSIUM  3.5  3.6   CHLORIDE  106  107   EVA  8.4*  9.0   CO2  29  31   BUN  15  16   CR  0.78  0.99   GLC  103*  82       Liver Function Studies -   Recent Labs   Lab Test  10/31/17   1115  12/10/16   0128   PROTTOTAL  7.3  6.8   ALBUMIN  3.6  3.7   BILITOTAL  0.4  0.5   ALKPHOS  87  75   AST  19  17   ALT  18  14       TSH   Date Value Ref Range Status   10/31/2017 4.79 (H) 0.40  - 4.00 mU/L Final   11/24/2016 2.96 0.40 - 4.00 mU/L Final   ]    Lab Results   Component Value Date    A1C 5.9 12/05/2013       ASSESSMENT/PLAN:  Shoulder dislocation, left, initial encounter  Closed supracondylar fracture of left humerus with routine healing, subsequent encounter  Wearing immobilizer now, has follow up with ortho in about 2 weeks. Conservative management planned. Shoulder dislocation was reduced in ED, humerus fracture stabilized and goal is to continue with conservative management at this time. Appreciate recommendations of ortho    Fall, subsequent encounter  Likely mechanical in nature, per her report of the incident. Starting therapy to improve strength and mobility.     Delirium  Did have some increased confusion and agitation while hospitalized, thought to be due to opiates. Confusion cleared with discontinuation of opioids. Daughter in law would prefer no opiates be used. Patient reports minimal pain at rest, has not used prescribed oxycodone over last several days of admission to TCU. Will discontinue     Major depressive disorder, single episode, mild (H)  On buspirone, duloxetine, mirtazapine. Monitor, The current medical regimen is effective;  continue present plan and medications.     Interstitial fibrosis (H)  Hypoxemia  previously used  O2 2 lpm at night, did require O2 during the day at hospital. Will monitor, may need ongoing daytime O2 now. Will monitor and wean off O2 during the day if possible.     Benign essential hypertension  On losartan at increased dose, amlodipine, metoprolol. Will monitor, may need titration of dosing.   BP Readings from Last 6 Encounters:   02/08/18 115/74   02/07/18 161/75   02/06/18 156/78   11/28/17 128/74   11/17/17 136/65   10/31/17 142/68        Generalized anxiety disorder  Appears to have good control, will monitor.     Hypothyroidism, unspecified type  TSH   Date Value Ref Range Status   10/31/2017 4.79 (H) 0.40 - 4.00 mU/L Final   ] TSH  slightly elevated but without symptoms. The current medical regimen is effective;  continue present plan and medications.   Noted goal in [facility] pts 4-5 and  New studies have shown there is no  difference in hypothyroid symptoms or tiredness scores after one year of treatment if TSH is between the upper reference limit and 9.9 mU/L.  (Calvin DJ, Naldo N, Josias PM, et al. Thyroid Hormone Therapy for Older Adults with Subclinical Hypothyroidism. N Engl J Med 2017. Treatment with levothyroxine provides no symptomatic benefit in older adults with subclinical hypothyroidism (April 2017 Up to Date)        Orders:  1. DC all oxycodone orders- caused delirium    Information reviewed:  Medications, vital signs, orders, nursing notes, problem list, hospital information. Total time spent with patient visit was 39 minutes including patient visit, review of past records and discussion with daughter in law. Greater than 50% of total time spent with counseling and coordinating care, regarding multiple medical comorbid conditions.    Electronically signed by:  Janneth Alba CNP   [facility]  [phone] cell

## 2018-02-15 ENCOUNTER — NURSING HOME VISIT (OUTPATIENT)
Dept: GERIATRICS | Facility: CLINIC | Age: 83
End: 2018-02-15
Payer: COMMERCIAL

## 2018-02-15 VITALS
HEART RATE: 81 BPM | SYSTOLIC BLOOD PRESSURE: 149 MMHG | OXYGEN SATURATION: 95 % | WEIGHT: 165 LBS | DIASTOLIC BLOOD PRESSURE: 80 MMHG | BODY MASS INDEX: 33.33 KG/M2 | RESPIRATION RATE: 18 BRPM | TEMPERATURE: 97.1 F

## 2018-02-15 DIAGNOSIS — S42.412D CLOSED SUPRACONDYLAR FRACTURE OF LEFT HUMERUS WITH ROUTINE HEALING, SUBSEQUENT ENCOUNTER: ICD-10-CM

## 2018-02-15 DIAGNOSIS — S43.005A SHOULDER DISLOCATION, LEFT, INITIAL ENCOUNTER: Primary | ICD-10-CM

## 2018-02-15 PROCEDURE — 99308 SBSQ NF CARE LOW MDM 20: CPT | Performed by: NURSE PRACTITIONER

## 2018-02-15 NOTE — LETTER
2/15/2018        RE: Lara Wills  [address] [address] [place] [address] [address] [place] [id number]        [facility]    Chief Complaint   Patient presents with     Nursing Home Acute       HPI:    Lara Wills is a 89 year old  (6/12/1922), who is being seen today for an episodic care visit at [facility].  HPI information obtained from: facility chart records, facility staff, patient report and [facility] chart review.Today's concern is:     Shoulder dislocation, left, initial encounter  Closed supracondylar fracture of left humerus with routine healing, subsequent encounter   No new concerns, pain controlled. She continues to work with therapy, walking about 25 ft with standard cane and assist but is having some difficulty with balance.     ALLERGIES: Codeine sulfate and Penicillins  Past Medical, Surgical, Family and Social History reviewed and updated in [facility].    Current Outpatient Prescriptions   Medication Sig Dispense Refill     miconazole (MICATIN; MICRO GUARD) 2 % powder Apply topically 2 times daily       Acetaminophen (TYLENOL PO) Take 650 mg by mouth every 4 hours as needed for mild pain or fever       acetaminophen (TYLENOL) 325 MG tablet Take 3 tablets (975 mg) by mouth every 8 hours 100 tablet 3     ibuprofen (ADVIL/MOTRIN) 600 MG tablet Take 1 tablet (600 mg) by mouth every 6 hours as needed for moderate pain 120 tablet 3     levothyroxine (SYNTHROID/LEVOTHROID) 25 MCG tablet TAKE 1 TABLET (25 MCG) BY MOUTH DAILY 90 tablet 1     losartan (COZAAR) 25 MG tablet TAKE 1 TABLET (25 MG) BY MOUTH 2 TIMES DAILY 180 tablet 1     amLODIPine (NORVASC) 5 MG tablet TAKE 1 TABLET BY MOUTH TWICE DAILY 180 tablet 1     brimonidine (ALPHAGAN) 0.2 % ophthalmic solution Place 1 drop Into the left eye 2 times daily       zolpidem (AMBIEN) 5 MG tablet Take 0.5 tablets (2.5 mg) by mouth nightly as needed for sleep 30 tablet 0     metoprolol (TOPROL-XL) 25 MG 24 hr tablet Take 1 tablet (25 mg) by  mouth 2 times daily 180 tablet 3     busPIRone (BUSPAR) 10 MG tablet Take 10 mg by mouth 2 times daily 0800, 1700       VITAMIN D, CHOLECALCIFEROL, PO Take 2,000 Units by mouth daily At 1200       DULoxetine (CYMBALTA) 60 MG EC capsule Take 60 mg by mouth daily       mirtazapine (REMERON) 15 MG tablet Take 15 mg by mouth At Bedtime       order for DME Oxygen 2 Li/min  at night       latanoprost (XALATAN) 0.005 % ophthalmic solution Place 1 drop into both eyes At Bedtime 1 Bottle 4     multivitamin  with lutein (OCUVITE WITH LTEIN) CAPS Take 1 capsule by mouth daily At 1200       calcium-vitamin D (CALTRATE) 600-400 MG-UNIT per tablet Take 1 tablet by mouth 2 times daily At 1200, and 8 pm       docusate sodium (COLACE) 100 MG capsule Take 100 mg by mouth 3 times daily 0800, 1200, 2000       Medications reviewed:  Medications reconciled to facility chart and changes were made to reflect current medications as identified as above med list. Below are the changes that were made:   Medications stopped since last EPIC medication reconciliation:   There are no discontinued medications.    Medications started since last [facility] medication reconciliation:  Orders Placed This Encounter   Medications     miconazole (MICATIN; MICRO GUARD) 2 % powder     Sig: Apply topically 2 times daily     Acetaminophen (TYLENOL PO)     Sig: Take 650 mg by mouth every 4 hours as needed for mild pain or fever         REVIEW OF SYSTEMS:  4 point ROS including Respiratory, CV, GI and , other than that noted in the HPI,  is negative    Physical Exam:  /80  Pulse 81  Temp 97.1  F (36.2  C)  Resp 18  Wt 165 lb (74.8 kg)  SpO2 95%  BMI 33.33 kg/m2  GENERAL APPEARANCE:  Alert, in no distress  HEAD:  Normal, normocephalic, atraumatic  EYE EXAM: normal external eye, conjunctiva, lids, AURE  NECK EXAM: supple, no JVD  CHEST/RESP:  respiratory effort normal, lung sounds CTA , no respiratory distress  CV:  Rate reg, rhythm reg, no murmur, no  peripheral edema   M/S:   extremities normal, gait normal-unsteady with cane, normal muscle tone, and range of motion on unaffected limbs  NEUROLOGIC EXAM: Normal gross motor movement, tone and coordination. No tremor.  Cranial nerves 2-12 are normal tested and grossly at patient's baseline  PSYCH:  Alert and oriented to self and surroundings, affect pleasant , judgement appropriate   MoCA 13/30       Recent Labs:     CBC RESULTS:   Recent Labs   Lab Test  02/03/18   0950  10/31/17   1115   WBC  9.6  7.7   RBC  4.39  4.26   HGB  13.1  12.7   HCT  39.5  38.9   MCV  90  91   MCH  29.8  29.8   MCHC  33.2  32.6   RDW  12.9  13.7   PLT  393  323       Last Basic Metabolic Panel:  Recent Labs   Lab Test  02/03/18   0950  10/31/17   1115   NA  143  144   POTASSIUM  3.5  3.6   CHLORIDE  106  107   EVA  8.4*  9.0   CO2  29  31   BUN  15  16   CR  0.78  0.99   GLC  103*  82       Liver Function Studies -   Recent Labs   Lab Test  10/31/17   1115  12/10/16   0128   PROTTOTAL  7.3  6.8   ALBUMIN  3.6  3.7   BILITOTAL  0.4  0.5   ALKPHOS  87  75   AST  19  17   ALT  18  14       TSH   Date Value Ref Range Status   10/31/2017 4.79 (H) 0.40 - 4.00 mU/L Final   11/24/2016 2.96 0.40 - 4.00 mU/L Final   ]    Lab Results   Component Value Date    A1C 5.9 12/05/2013         Assessment/Plan:  Shoulder dislocation, left, initial encounter  Closed supracondylar fracture of left humerus with routine healing, subsequent encounter  Continues to work with therapy and goal is to become more ambulatory so she can return to her assisted living facility. continue therapy and current medical management, progressing slowly. Sees ortho in follow up soon      Orders:  No new orders       Electronically signed by  ROSANGELA Kirk CNP                      Sincerely,        ROSANGELA Kirk CNP

## 2018-02-15 NOTE — PROGRESS NOTES
[place] [facility]    Chief Complaint   Patient presents with     Nursing Home Acute       HPI:    Lara Wills is a 89 year old  (6/12/1922), who is being seen today for an episodic care visit at [facility].  HPI information obtained from: facility chart records, facility staff, patient report and [facility] chart review.Today's concern is:     Shoulder dislocation, left, initial encounter  Closed supracondylar fracture of left humerus with routine healing, subsequent encounter   No new concerns, pain controlled. She continues to work with therapy, walking about 25 ft with standard cane and assist but is having some difficulty with balance.     ALLERGIES: Codeine sulfate and Penicillins  Past Medical, Surgical, Family and Social History reviewed and updated in [facility].    Current Outpatient Prescriptions   Medication Sig Dispense Refill     miconazole (MICATIN; MICRO GUARD) 2 % powder Apply topically 2 times daily       Acetaminophen (TYLENOL PO) Take 650 mg by mouth every 4 hours as needed for mild pain or fever       acetaminophen (TYLENOL) 325 MG tablet Take 3 tablets (975 mg) by mouth every 8 hours 100 tablet 3     ibuprofen (ADVIL/MOTRIN) 600 MG tablet Take 1 tablet (600 mg) by mouth every 6 hours as needed for moderate pain 120 tablet 3     levothyroxine (SYNTHROID/LEVOTHROID) 25 MCG tablet TAKE 1 TABLET (25 MCG) BY MOUTH DAILY 90 tablet 1     losartan (COZAAR) 25 MG tablet TAKE 1 TABLET (25 MG) BY MOUTH 2 TIMES DAILY 180 tablet 1     amLODIPine (NORVASC) 5 MG tablet TAKE 1 TABLET BY MOUTH TWICE DAILY 180 tablet 1     brimonidine (ALPHAGAN) 0.2 % ophthalmic solution Place 1 drop Into the left eye 2 times daily       zolpidem (AMBIEN) 5 MG tablet Take 0.5 tablets (2.5 mg) by mouth nightly as needed for sleep 30 tablet 0     metoprolol (TOPROL-XL) 25 MG 24 hr tablet Take 1 tablet (25 mg) by mouth 2 times daily 180 tablet 3     busPIRone (BUSPAR) 10 MG tablet Take 10 mg by mouth 2 times daily  0800, 1700       VITAMIN D, CHOLECALCIFEROL, PO Take 2,000 Units by mouth daily At 1200       DULoxetine (CYMBALTA) 60 MG EC capsule Take 60 mg by mouth daily       mirtazapine (REMERON) 15 MG tablet Take 15 mg by mouth At Bedtime       order for DME Oxygen 2 Li/min  at night       latanoprost (XALATAN) 0.005 % ophthalmic solution Place 1 drop into both eyes At Bedtime 1 Bottle 4     multivitamin  with lutein (OCUVITE WITH LTEIN) CAPS Take 1 capsule by mouth daily At 1200       calcium-vitamin D (CALTRATE) 600-400 MG-UNIT per tablet Take 1 tablet by mouth 2 times daily At 1200, and 8 pm       docusate sodium (COLACE) 100 MG capsule Take 100 mg by mouth 3 times daily 0800, 1200, 2000       Medications reviewed:  Medications reconciled to facility chart and changes were made to reflect current medications as identified as above med list. Below are the changes that were made:   Medications stopped since last EPIC medication reconciliation:   There are no discontinued medications.    Medications started since last [facility] medication reconciliation:  Orders Placed This Encounter   Medications     miconazole (MICATIN; MICRO GUARD) 2 % powder     Sig: Apply topically 2 times daily     Acetaminophen (TYLENOL PO)     Sig: Take 650 mg by mouth every 4 hours as needed for mild pain or fever         REVIEW OF SYSTEMS:  4 point ROS including Respiratory, CV, GI and , other than that noted in the HPI,  is negative    Physical Exam:  /80  Pulse 81  Temp 97.1  F (36.2  C)  Resp 18  Wt 165 lb (74.8 kg)  SpO2 95%  BMI 33.33 kg/m2  GENERAL APPEARANCE:  Alert, in no distress  HEAD:  Normal, normocephalic, atraumatic  EYE EXAM: normal external eye, conjunctiva, lids, AURE  NECK EXAM: supple, no JVD  CHEST/RESP:  respiratory effort normal, lung sounds CTA , no respiratory distress  CV:  Rate reg, rhythm reg, no murmur, no peripheral edema   M/S:   extremities normal, gait normal-unsteady with cane, normal muscle tone, and  range of motion on unaffected limbs  NEUROLOGIC EXAM: Normal gross motor movement, tone and coordination. No tremor.  Cranial nerves 2-12 are normal tested and grossly at patient's baseline  PSYCH:  Alert and oriented to self and surroundings, affect pleasant , judgement appropriate   MoCA 13/30       Recent Labs:     CBC RESULTS:   Recent Labs   Lab Test  02/03/18   0950  10/31/17   1115   WBC  9.6  7.7   RBC  4.39  4.26   HGB  13.1  12.7   HCT  39.5  38.9   MCV  90  91   MCH  29.8  29.8   MCHC  33.2  32.6   RDW  12.9  13.7   PLT  393  323       Last Basic Metabolic Panel:  Recent Labs   Lab Test  02/03/18   0950  10/31/17   1115   NA  143  144   POTASSIUM  3.5  3.6   CHLORIDE  106  107   EVA  8.4*  9.0   CO2  29  31   BUN  15  16   CR  0.78  0.99   GLC  103*  82       Liver Function Studies -   Recent Labs   Lab Test  10/31/17   1115  12/10/16   0128   PROTTOTAL  7.3  6.8   ALBUMIN  3.6  3.7   BILITOTAL  0.4  0.5   ALKPHOS  87  75   AST  19  17   ALT  18  14       TSH   Date Value Ref Range Status   10/31/2017 4.79 (H) 0.40 - 4.00 mU/L Final   11/24/2016 2.96 0.40 - 4.00 mU/L Final   ]    Lab Results   Component Value Date    A1C 5.9 12/05/2013         Assessment/Plan:  Shoulder dislocation, left, initial encounter  Closed supracondylar fracture of left humerus with routine healing, subsequent encounter  Continues to work with therapy and goal is to become more ambulatory so she can return to her assisted living facility. continue therapy and current medical management, progressing slowly. Sees ortho in follow up soon      Orders:  No new orders       Electronically signed by  ROSANGELA Kirk CNP

## 2018-02-19 VITALS
DIASTOLIC BLOOD PRESSURE: 74 MMHG | RESPIRATION RATE: 18 BRPM | TEMPERATURE: 98.2 F | HEART RATE: 72 BPM | SYSTOLIC BLOOD PRESSURE: 116 MMHG | OXYGEN SATURATION: 98 %

## 2018-02-19 NOTE — PROGRESS NOTES
[place] [facility]  PRIMARY CARE PROVIDER AND CLINIC:  Alejandra Watson [id number] [facility] / [facility] [id number]  Chief Complaint   Patient presents with     Hospital F/U       HPI: Patient was not seen today since she had called with the facility for her appointment with the orthopedist .  Chart and labs reviewed, discussed with the nurse manager.   Lara Wills is a 89 year old  (6/12/1922),admitted to the [facility].  Hospital stay 2/3/18 through 2/6/18.  Admitted to this facility for  rehab, medical management and nursing care.  HPI information obtained from: facility chart records, facility staff, patient report and [facility] chart review.      Patient with past medical history significant for hypertension, hypothyroidism, who lives at an assisted living facility, who sustained a mechanical fall.  Was found to have dislocated shoulder reduced in the ER and also supracondylar close fracture managed conservatively .  Was found to have high readings of blood pressure and his losartan was doubled to 50 mg twice daily with good response.  Developed drug-induced encephalopathy secondary to narcotics improved with stopping the opioids temporarily.  At [facility] opioids discontinued     #We will attempt to see the patient during our next visit if she is still at the facility.       Electronically signed by:  Janey Otero MD

## 2018-02-20 NOTE — LETTER
2/20/2018        RE: Lara Wills  [address] [address] [place] [address] [address]  [facility] [address]        [place] [facility]  PRIMARY CARE PROVIDER AND CLINIC:  Alejandra Watson [id number] [address] / [facility] [address]  Chief Complaint   Patient presents with     Hospital F/U       HPI: Patient was not seen today since she had called with the facility for her appointment with the orthopedist .  Chart and labs reviewed, discussed with the nurse manager.   Lara Wills is a 89 year old  (6/12/1922),admitted to the [facility].  Hospital stay 2/3/18 through 2/6/18.  Admitted to this facility for  rehab, medical management and nursing care.  HPI information obtained from: facility chart records, facility staff, patient report and [facility] chart review.      Patient with past medical history significant for hypertension, hypothyroidism, who lives at an assisted living facility, who sustained a mechanical fall.  Was found to have dislocated shoulder reduced in the ER and also supracondylar close fracture managed conservatively .  Was found to have high readings of blood pressure and his losartan was doubled to 50 mg twice daily with good response.  Developed drug-induced encephalopathy secondary to narcotics improved with stopping the opioids temporarily.  At [facility] opioids discontinued     #We will attempt to see the patient during our next visit if she is still at the facility.       Electronically signed by:  Janey Otero MD                    Sincerely,        Janey Otero MD

## 2018-02-22 NOTE — LETTER
2/22/2018        RE: Lara Wills  [address] [address] [place] [address] [address] [place] [id number]        [place] [facility]    Chief Complaint   Patient presents with     Nursing Home Acute       HPI:    Lara Wills is a 89 year old  (6/12/1922), who is being seen today for an episodic care visit at [facility].  HPI information obtained from: facility chart records, facility staff, patient report and [facility] chart review.Today's concern is:     Shoulder dislocation, left, initial encounter  Closed supracondylar fracture of left humerus with routine healing, subsequent encounter  Mild cognitive impairment  Benign essential hypertension  Undiagnosed cardiac murmurs     Lara reports she is feeling well, no pain, guarding to be able to use her arm with full weightbearing capabilities after follow-up appointment with Ortho this week.  She reports no other concerns, is feeling well, and is anxious to return to her assisted living apartment.      ALLERGIES: Codeine sulfate and Penicillins  Past Medical, Surgical, Family and Social History reviewed and updated in [facility].    Current Outpatient Prescriptions   Medication Sig Dispense Refill     Acetaminophen (TYLENOL PO) Take 975 mg by mouth 3 times daily       METOPROLOL SUCCINATE ER PO Take 25 mg by mouth daily       miconazole (MICATIN; MICRO GUARD) 2 % powder Apply topically 2 times daily       ibuprofen (ADVIL/MOTRIN) 600 MG tablet Take 1 tablet (600 mg) by mouth every 6 hours as needed for moderate pain 120 tablet 3     levothyroxine (SYNTHROID/LEVOTHROID) 25 MCG tablet TAKE 1 TABLET (25 MCG) BY MOUTH DAILY 90 tablet 1     losartan (COZAAR) 25 MG tablet TAKE 1 TABLET (25 MG) BY MOUTH 2 TIMES DAILY 180 tablet 1     amLODIPine (NORVASC) 5 MG tablet TAKE 1 TABLET BY MOUTH TWICE DAILY 180 tablet 1     brimonidine (ALPHAGAN) 0.2 % ophthalmic solution Place 1 drop Into the left eye 2 times daily       busPIRone (BUSPAR) 10 MG tablet Take 10 mg by mouth  2 times daily 0800, 1700       VITAMIN D, CHOLECALCIFEROL, PO Take 2,000 Units by mouth daily At 1200       DULoxetine (CYMBALTA) 60 MG EC capsule Take 60 mg by mouth daily       mirtazapine (REMERON) 15 MG tablet Take 15 mg by mouth At Bedtime       order for DME Oxygen 2 Li/min  at night       latanoprost (XALATAN) 0.005 % ophthalmic solution Place 1 drop into both eyes At Bedtime 1 Bottle 4     multivitamin  with lutein (OCUVITE WITH LTEIN) CAPS Take 1 capsule by mouth daily At 1200       calcium-vitamin D (CALTRATE) 600-400 MG-UNIT per tablet Take 1 tablet by mouth 2 times daily At 1200, and 8 pm       docusate sodium (COLACE) 100 MG capsule Take 100 mg by mouth 3 times daily 0800, 1200, 2000       Medications reviewed:  Medications reconciled to facility chart and changes were made to reflect current medications as identified as above med list. Below are the changes that were made:   Medications stopped since last EPIC medication reconciliation:   Medications Discontinued During This Encounter   Medication Reason     acetaminophen (TYLENOL) 325 MG tablet      Acetaminophen (TYLENOL PO)      zolpidem (AMBIEN) 5 MG tablet        Medications started since last [facility] medication reconciliation:  Orders Placed This Encounter   Medications     Acetaminophen (TYLENOL PO)     Sig: Take 975 mg by mouth 3 times daily         REVIEW OF SYSTEMS:  4 point ROS including Respiratory, CV, GI and , other than that noted in the HPI,  is negative    Physical Exam:  /75  Pulse 84  Temp 97.5  F (36.4  C)  Resp 20  Wt 165 lb (74.8 kg)  SpO2 94%  BMI 33.33 kg/m2  GENERAL APPEARANCE:  Alert, in no distress, appears healthy  RESP:  respiratory effort and palpation of chest normal, lungs clear to auscultation , no respiratory distress  CV:  Palpation and auscultation of heart done , regular rate and rhythm, no rub, or gallop, grade 2-3/6 murmur  M/S:   Gait and station normal  PSYCH:  oriented X 3, memory impaired    Recent  Labs:     CBC RESULTS:   Recent Labs   Lab Test  02/03/18   0950  10/31/17   1115   WBC  9.6  7.7   RBC  4.39  4.26   HGB  13.1  12.7   HCT  39.5  38.9   MCV  90  91   MCH  29.8  29.8   MCHC  33.2  32.6   RDW  12.9  13.7   PLT  393  323       Last Basic Metabolic Panel:  Recent Labs   Lab Test  02/03/18   0950  10/31/17   1115   NA  143  144   POTASSIUM  3.5  3.6   CHLORIDE  106  107   EVA  8.4*  9.0   CO2  29  31   BUN  15  16   CR  0.78  0.99   GLC  103*  82       Liver Function Studies -   Recent Labs   Lab Test  10/31/17   1115  12/10/16   0128   PROTTOTAL  7.3  6.8   ALBUMIN  3.6  3.7   BILITOTAL  0.4  0.5   ALKPHOS  87  75   AST  19  17   ALT  18  14       TSH   Date Value Ref Range Status   10/31/2017 4.79 (H) 0.40 - 4.00 mU/L Final   11/24/2016 2.96 0.40 - 4.00 mU/L Final   ]    Lab Results   Component Value Date    A1C 5.9 12/05/2013       Assessment/Plan:  Shoulder dislocation, left, initial encounter  Closed supracondylar fracture of left humerus with routine healing, subsequent encounter  Saw orthopedics in follow-up this week, and splint/sling has been removed.  Full weightbearing and full range of motion now allowed and therapy has begun working with her.  She should return for orthopedic follow-up in about 4 weeks.  She is not having pain, using ibuprofen and as needed and scheduled Tylenol.    Mild cognitive impairment  MO CA 13/30.  ACL 4.4/5.6, she is alert oriented to self and surroundings, pleasant, able to make her needs known. Maintain safe living situation .     Benign essential hypertension  She is on metoprolol succinate 25 mg twice daily, pharmacy requests review to determine if this is appropriate dosing as metoprolol succinate should be used only daily.  She is also on losartan, amlodipine.  Blood pressures as listed below, no chest pain, no shortness of breath.  Will change metoprolol succinate to once daily dosing and monitor BP.  BP Readings from Last 6 Encounters:   02/22/18 134/75    02/19/18 116/74   02/15/18 149/80   02/08/18 115/74   02/07/18 161/75   02/06/18 156/78        Undiagnosed cardiac murmurs  Systolic murmur noted, she reports she has had this for years.  No recent cardiology follow-up.  Echocardiogram in 2013, as reviewed in epic, reveals mild aortic stenosis, mild left ventricular diastolic dysfunction, moderate mitral regurgitation.  Suspect murmur is related to aortic stenosis.  She is asymptomatic, advanced age, will defer to PCP for ongoing follow-up as appropriate.      Orders:  1. DC metoprolol 25 BID  2. Metoprolol Succinate 25 mg po QD Dx HTN      Electronically signed by  ROSANGELA Kirk CNP                      Sincerely,        ROSANGELA Kirk CNP

## 2018-02-22 NOTE — PROGRESS NOTES
[place] [facility]    Chief Complaint   Patient presents with     Nursing Home Acute       HPI:    Lara Wills is a 89 year old  (6/12/1922), who is being seen today for an episodic care visit at [facility].  HPI information obtained from: facility chart records, facility staff, patient report and [facility] chart review.Today's concern is:     Shoulder dislocation, left, initial encounter  Closed supracondylar fracture of left humerus with routine healing, subsequent encounter  Mild cognitive impairment  Benign essential hypertension  Undiagnosed cardiac murmurs     Lara reports she is feeling well, no pain, guarding to be able to use her arm with full weightbearing capabilities after follow-up appointment with Ortho this week.  She reports no other concerns, is feeling well, and is anxious to return to her assisted living apartment.      ALLERGIES: Codeine sulfate and Penicillins  Past Medical, Surgical, Family and Social History reviewed and updated in [facility].    Current Outpatient Prescriptions   Medication Sig Dispense Refill     Acetaminophen (TYLENOL PO) Take 975 mg by mouth 3 times daily       METOPROLOL SUCCINATE ER PO Take 25 mg by mouth daily       miconazole (MICATIN; MICRO GUARD) 2 % powder Apply topically 2 times daily       ibuprofen (ADVIL/MOTRIN) 600 MG tablet Take 1 tablet (600 mg) by mouth every 6 hours as needed for moderate pain 120 tablet 3     levothyroxine (SYNTHROID/LEVOTHROID) 25 MCG tablet TAKE 1 TABLET (25 MCG) BY MOUTH DAILY 90 tablet 1     losartan (COZAAR) 25 MG tablet TAKE 1 TABLET (25 MG) BY MOUTH 2 TIMES DAILY 180 tablet 1     amLODIPine (NORVASC) 5 MG tablet TAKE 1 TABLET BY MOUTH TWICE DAILY 180 tablet 1     brimonidine (ALPHAGAN) 0.2 % ophthalmic solution Place 1 drop Into the left eye 2 times daily       busPIRone (BUSPAR) 10 MG tablet Take 10 mg by mouth 2 times daily 0800, 1700       VITAMIN D, CHOLECALCIFEROL, PO Take 2,000 Units by mouth daily At 1200        DULoxetine (CYMBALTA) 60 MG EC capsule Take 60 mg by mouth daily       mirtazapine (REMERON) 15 MG tablet Take 15 mg by mouth At Bedtime       order for DME Oxygen 2 Li/min  at night       latanoprost (XALATAN) 0.005 % ophthalmic solution Place 1 drop into both eyes At Bedtime 1 Bottle 4     multivitamin  with lutein (OCUVITE WITH LTEIN) CAPS Take 1 capsule by mouth daily At 1200       calcium-vitamin D (CALTRATE) 600-400 MG-UNIT per tablet Take 1 tablet by mouth 2 times daily At 1200, and 8 pm       docusate sodium (COLACE) 100 MG capsule Take 100 mg by mouth 3 times daily 0800, 1200, 2000       Medications reviewed:  Medications reconciled to facility chart and changes were made to reflect current medications as identified as above med list. Below are the changes that were made:   Medications stopped since last EPIC medication reconciliation:   Medications Discontinued During This Encounter   Medication Reason     acetaminophen (TYLENOL) 325 MG tablet      Acetaminophen (TYLENOL PO)      zolpidem (AMBIEN) 5 MG tablet        Medications started since last [facility] medication reconciliation:  Orders Placed This Encounter   Medications     Acetaminophen (TYLENOL PO)     Sig: Take 975 mg by mouth 3 times daily         REVIEW OF SYSTEMS:  4 point ROS including Respiratory, CV, GI and , other than that noted in the HPI,  is negative    Physical Exam:  /75  Pulse 84  Temp 97.5  F (36.4  C)  Resp 20  Wt 165 lb (74.8 kg)  SpO2 94%  BMI 33.33 kg/m2  GENERAL APPEARANCE:  Alert, in no distress, appears healthy  RESP:  respiratory effort and palpation of chest normal, lungs clear to auscultation , no respiratory distress  CV:  Palpation and auscultation of heart done , regular rate and rhythm, no rub, or gallop, grade 2-3/6 murmur  M/S:   Gait and station normal  PSYCH:  oriented X 3, memory impaired    Recent Labs:     CBC RESULTS:   Recent Labs   Lab Test  02/03/18   0950  10/31/17   1115   WBC  9.6  7.7   RBC   4.39  4.26   HGB  13.1  12.7   HCT  39.5  38.9   MCV  90  91   MCH  29.8  29.8   MCHC  33.2  32.6   RDW  12.9  13.7   PLT  393  323       Last Basic Metabolic Panel:  Recent Labs   Lab Test  02/03/18   0950  10/31/17   1115   NA  143  144   POTASSIUM  3.5  3.6   CHLORIDE  106  107   EVA  8.4*  9.0   CO2  29  31   BUN  15  16   CR  0.78  0.99   GLC  103*  82       Liver Function Studies -   Recent Labs   Lab Test  10/31/17   1115  12/10/16   0128   PROTTOTAL  7.3  6.8   ALBUMIN  3.6  3.7   BILITOTAL  0.4  0.5   ALKPHOS  87  75   AST  19  17   ALT  18  14       TSH   Date Value Ref Range Status   10/31/2017 4.79 (H) 0.40 - 4.00 mU/L Final   11/24/2016 2.96 0.40 - 4.00 mU/L Final   ]    Lab Results   Component Value Date    A1C 5.9 12/05/2013       Assessment/Plan:  Shoulder dislocation, left, initial encounter  Closed supracondylar fracture of left humerus with routine healing, subsequent encounter  Saw orthopedics in follow-up this week, and splint/sling has been removed.  Full weightbearing and full range of motion now allowed and therapy has begun working with her.  She should return for orthopedic follow-up in about 4 weeks.  She is not having pain, using ibuprofen and as needed and scheduled Tylenol.    Mild cognitive impairment  MO CA 13/30.  ACL 4.4/5.6, she is alert oriented to self and surroundings, pleasant, able to make her needs known. Maintain safe living situation .     Benign essential hypertension  She is on metoprolol succinate 25 mg twice daily, pharmacy requests review to determine if this is appropriate dosing as metoprolol succinate should be used only daily.  She is also on losartan, amlodipine.  Blood pressures as listed below, no chest pain, no shortness of breath.  Will change metoprolol succinate to once daily dosing and monitor BP.  BP Readings from Last 6 Encounters:   02/22/18 134/75   02/19/18 116/74   02/15/18 149/80   02/08/18 115/74   02/07/18 161/75   02/06/18 156/78         Undiagnosed cardiac murmurs  Systolic murmur noted, she reports she has had this for years.  No recent cardiology follow-up.  Echocardiogram in 2013, as reviewed in epic, reveals mild aortic stenosis, mild left ventricular diastolic dysfunction, moderate mitral regurgitation.  Suspect murmur is related to aortic stenosis.  She is asymptomatic, advanced age, will defer to PCP for ongoing follow-up as appropriate.      Orders:  1. DC metoprolol 25 BID  2. Metoprolol Succinate 25 mg po QD Dx HTN      Electronically signed by  ROSANGELA Kirk CNP

## 2018-02-23 PROBLEM — R01.1 UNDIAGNOSED CARDIAC MURMURS: Status: ACTIVE | Noted: 2018-01-01

## 2018-02-23 PROBLEM — G31.84 MILD COGNITIVE IMPAIRMENT: Status: ACTIVE | Noted: 2018-01-01

## 2018-03-05 NOTE — LETTER
3/5/2018        RE: Lara Wills  [address] [address] [place] [address] [address]  [facility] [id number]          [place] [facility] DISCHARGE SUMMARY    PATIENT'S NAME: Lara Wills  YOB: 1922  MEDICAL RECORD NUMBER:  [id number]    PRIMARY CARE PROVIDER AND CLINIC RESPONSIBLE AFTER TRANSFER: Alejandra Watson [id number] / [facility] [address]     CODE STATUS/ADVANCE DIRECTIVES DISCUSSION:   DNR / DNI       Allergies   Allergen Reactions     Codeine Sulfate Unknown     Penicillins Unknown       TRANSFERRING PROVIDERS: ROSANGELA Kirk CNP, Janey Otero MD   DATE OF SNF ADMISSION:  February / 06 / 2018  DATE OF SNF (anticipated) DISCHARGE: March / 06 / 2018  DISCHARGE DISPOSITION: [facility] Provider   Nursing Facility: [facility] by the [facility] stay 2/3/18 to 2/6/18.     Condition on Discharge:  Improving.  Function:  Ambulatory with rolling walker   Cognitive Scores: MoCA 13/30, ACL 4.4/5.6    Equipment: walker    DISCHARGE DIAGNOSIS:   1. Shoulder dislocation, left, initial encounter    2. Closed supracondylar fracture of left humerus with routine healing, subsequent encounter    3. Fall, subsequent encounter    4. Mild cognitive impairment    5. Delirium    6. Major depressive disorder, single episode, mild (H)    7. Benign essential hypertension    8. Hypothyroidism, unspecified type    9. Hypoxemia    10. Generalized anxiety disorder        HPI Nursing Facility Course:  HPI information obtained from: facility chart records, facility staff, patient report and [facility] chart review. Patient had skilled nursing facility stay from 2/6/18 to 3/6/18 after left shoulder dislocation, closed supracondylar fx of left humerus, weakness, and UTI. Nursing provided medication administration, education with medicaitons, pain management, oxygen management, and assistance with activities of daily living. Patient participated in PT/OT 5-6x per week. Will return to [facility] with  PT/OT/RN/HHA through [facility].    Patient lives at [facility] living [facility] and stood up to go to the bathroom when she slipped and fell on to her L shoulder. She was found to have L shoulder dislocation and L humerus fracture. Ortho was consulted and conservative management of humerus fracture was planned.  She did have shoulder dislocation reduced in the ED  She did have some mild delirium with use of narcotics, but her pain was successfully managed without the use of narcotics.     Shoulder dislocation, left, initial encounter  minimal pain-controlled with scheduled tylenol. She does have about 45 degrees of ROM of shoulder but is generally functionally independent. She is able to ambulate with her rolling walker about the unit. Daughter who sets up her medications will be monitoring pain control with goal to return to prn use of tylenol as pain improves.     Closed supracondylar fracture of left humerus with routine healing, subsequent encounter  Originally was splinted and NWB for about 2 weeks, but no restrictions/limitations now. She is functional with some mild pain as noted above.     Fall, subsequent encounter  No further falls while in TCU. This fall felt to be mechanical in nature.     Mild cognitive impairment  MoCA score 13/30 indicating mod cognitive impairment. She is alert, able to make her needs known and pleasant.     Delirium  Noted to have increased confusion while hospitalized, and thought to be related to narcotic use. Delirium resolved after narcotic use discontinued.     Major depressive disorder, single episode, mild (H)  On remeron at hs, cymbalta. No obvious depressive symptoms, stable. The current medical regimen is effective;  continue present plan and medications.     Benign essential hypertension  On losartan, amlodipine, metoprolol. Stable BPs. No changes in medications made    Hypothyroidism, unspecified type  On levothyroxine at PTA dose, no changes made.  Stable, compensated. The current medical regimen is effective;  continue present plan and medications.   TSH   Date Value Ref Range Status   10/31/2017 4.79 (H) 0.40 - 4.00 mU/L Final   ]     VIRY (generalized anxiety disorder)  On buspirone at PTA dose, no changes made. Stable, compensated. The current medical regimen is effective;  continue present plan and medications.       PAST MEDICAL HISTORY:  has no past medical history on file.    DISCHARGE MEDICATIONS:  Current Outpatient Prescriptions   Medication Sig Dispense Refill     Acetaminophen (TYLENOL PO) Take 975 mg by mouth 3 times daily       METOPROLOL SUCCINATE ER PO Take 25 mg by mouth daily       miconazole (MICATIN; MICRO GUARD) 2 % powder Apply topically 2 times daily       ibuprofen (ADVIL/MOTRIN) 600 MG tablet Take 1 tablet (600 mg) by mouth every 6 hours as needed for moderate pain 120 tablet 3     levothyroxine (SYNTHROID/LEVOTHROID) 25 MCG tablet TAKE 1 TABLET (25 MCG) BY MOUTH DAILY 90 tablet 1     losartan (COZAAR) 25 MG tablet TAKE 1 TABLET (25 MG) BY MOUTH 2 TIMES DAILY 180 tablet 1     amLODIPine (NORVASC) 5 MG tablet TAKE 1 TABLET BY MOUTH TWICE DAILY 180 tablet 1     brimonidine (ALPHAGAN) 0.2 % ophthalmic solution Place 1 drop Into the left eye 2 times daily       busPIRone (BUSPAR) 10 MG tablet Take 10 mg by mouth 2 times daily 0800, 1700       VITAMIN D, CHOLECALCIFEROL, PO Take 2,000 Units by mouth daily At 1200       DULoxetine (CYMBALTA) 60 MG EC capsule Take 60 mg by mouth daily       mirtazapine (REMERON) 15 MG tablet Take 15 mg by mouth At Bedtime       order for DME Oxygen 2 Li/min  at night       latanoprost (XALATAN) 0.005 % ophthalmic solution Place 1 drop into both eyes At Bedtime 1 Bottle 4     multivitamin  with lutein (OCUVITE WITH LTEIN) CAPS Take 1 capsule by mouth daily At 1200       calcium-vitamin D (CALTRATE) 600-400 MG-UNIT per tablet Take 1 tablet by mouth 2 times daily At 1200, and 8 pm       docusate sodium (COLACE)  100 MG capsule Take 100 mg by mouth 3 times daily 0800, 1200, 2000         MEDICATION CHANGES/RATIONALE:   Tylenol scheduled upon admission for pain control     Controlled medications sent with patient:   not applicable/none     ROS:    4 point ROS including Respiratory, CV, GI and , other than that noted in the HPI,  is negative    Physical Exam:   Vitals: /86  Pulse 85  Temp 97.8  F (36.6  C)  Resp 18  Wt 164 lb (74.4 kg)  SpO2 93%  BMI 33.12 kg/m2  BMI= Body mass index is 33.12 kg/(m^2).    GENERAL APPEARANCE:  Alert, in no distress  RESP:  lungs clear to auscultation , no respiratory distress  CV:  regular rate and rhythm, no murmur, rub, or gallop  M/S:   Gait and station normal  PSYCH:  oriented X 3, memory impaired     DISCHARGE PLAN:  Occupational Therapy, Physical Therapy, Registered Nurse, Home Health Aide and From:  [facility] Care  Patient instructed to follow-up with:  PCP in 7-10 days       Current [place] scheduled appointments:  No future appointments.    MTM referral needed and placed by this provider: No    Pending labs: none  SNF labs none  Discharge Treatments:  1. Patient may discharge to [facility] with [facility] PT/OT/RN/HHA to eval and treat.  2. Follow up with PCP in 5-10 days  3. Follow up with ortho per their recommendations.  4. May discharge with all current meds and treatments    TOTAL DISCHARGE TIME:   Greater than 30 minutes  Electronically signed by:  ROSANGELA Kirk CNP       Documentation of Face to Face and Certification for Home Health Services    I certify that patient: Lara Wills is under my care and that I, or a nurse practitioner or physician's assistant working with me, had a face-to-face encounter that meets the physician face-to-face encounter requirements with this patient on: 3/5/2018.    This encounter with the patient was in whole, or in part, for the following medical condition, which is the primary reason for home health care: eft  shoulder dislocation, closed supracondylar fx of left humerus, weakness, and UTI.    I certify that, based on my findings, the following services are medically necessary home health services: Nursing, Occupational Therapy, Physical Therapy and HHA.    My clinical findings support the need for the above services because: Nurse is needed: To provide caregiver training to assist with: eft shoulder dislocation, closed supracondylar fx of left humerus, weakness, and UTI.., Occupational Therapy Services are needed to assess and treat cognitive ability and address ADL safety due to impairment in eft shoulder dislocation, closed supracondylar fx of left humerus, weakness, and UTI. and Physical Therapy Services are needed to assess and treat the following functional impairments: eft shoulder dislocation, closed supracondylar fx of left humerus, weakness, and UTI.    Further, I certify that my clinical findings support that this patient is homebound (i.e. absences from home require considerable and taxing effort and are for medical reasons or [employer] services or infrequently or of short duration when for other reasons) because: Requires assistance of another person or specialized equipment to access medical services because patient: Requires supervision of another for safe transfer...    Based on the above findings. I certify that this patient is confined to the home and needs intermittent skilled nursing care, physical therapy and/or speech therapy.  The patient is under my care, and I have initiated the establishment of the plan of care.  This patient will be followed by a physician who will periodically review the plan of care.  Physician/Provider to provide follow up care: Alejandra Watson    Attending hospital physician (the Medicare certified [place] provider): ROSANGELA Kirk CNP  Physician Signature: See electronic signature associated with these discharge orders.  Date: 3/5/2018        Sincerely,        Janneth Alba  APRN CNP

## 2018-03-05 NOTE — PROGRESS NOTES
[place] [facility] DISCHARGE SUMMARY    PATIENT'S NAME: Lara Wills  YOB: 1922  MEDICAL RECORD NUMBER:  [id number]    PRIMARY CARE PROVIDER AND CLINIC RESPONSIBLE AFTER TRANSFER: Alejandra Watson [id number] [facility] [address]     CODE STATUS/ADVANCE DIRECTIVES DISCUSSION:   DNR / DNI       Allergies   Allergen Reactions     Codeine Sulfate Unknown     Penicillins Unknown       TRANSFERRING PROVIDERS: ROSANGELA Kirk CNP, Janey Otero MD   DATE OF SNF ADMISSION:  February / 06 / 2018  DATE OF SNF (anticipated) DISCHARGE: March / 06 / 2018  DISCHARGE DISPOSITION: [facility] Provider   Nursing Facility: [facility] by the [facility] stay 2/3/18 to 2/6/18.     Condition on Discharge:  Improving.  Function:  Ambulatory with rolling walker   Cognitive Scores: MoCA 13/30, ACL 4.4/5.6    Equipment: walker    DISCHARGE DIAGNOSIS:   1. Shoulder dislocation, left, initial encounter    2. Closed supracondylar fracture of left humerus with routine healing, subsequent encounter    3. Fall, subsequent encounter    4. Mild cognitive impairment    5. Delirium    6. Major depressive disorder, single episode, mild (H)    7. Benign essential hypertension    8. Hypothyroidism, unspecified type    9. Hypoxemia    10. Generalized anxiety disorder        HPI Nursing Facility Course:  HPI information obtained from: facility chart records, facility staff, patient report and [facility] chart review. Patient had skilled nursing facility stay from 2/6/18 to 3/6/18 after left shoulder dislocation, closed supracondylar fx of left humerus, weakness, and UTI. Nursing provided medication administration, education with medicaitons, pain management, oxygen management, and assistance with activities of daily living. Patient participated in PT/OT 5-6x per week. Will return to [facility] with PT/OT/RN/HHA through [facility].    Patient lives at [facility] living [facility] and  stood up to go to the bathroom when she slipped and fell on to her L shoulder. She was found to have L shoulder dislocation and L humerus fracture. Ortho was consulted and conservative management of humerus fracture was planned.  She did have shoulder dislocation reduced in the ED  She did have some mild delirium with use of narcotics, but her pain was successfully managed without the use of narcotics.     Shoulder dislocation, left, initial encounter  minimal pain-controlled with scheduled tylenol. She does have about 45 degrees of ROM of shoulder but is generally functionally independent. She is able to ambulate with her rolling walker about the unit. Daughter who sets up her medications will be monitoring pain control with goal to return to prn use of tylenol as pain improves.     Closed supracondylar fracture of left humerus with routine healing, subsequent encounter  Originally was splinted and NWB for about 2 weeks, but no restrictions/limitations now. She is functional with some mild pain as noted above.     Fall, subsequent encounter  No further falls while in TCU. This fall felt to be mechanical in nature.     Mild cognitive impairment  MoCA score 13/30 indicating mod cognitive impairment. She is alert, able to make her needs known and pleasant.     Delirium  Noted to have increased confusion while hospitalized, and thought to be related to narcotic use. Delirium resolved after narcotic use discontinued.     Major depressive disorder, single episode, mild (H)  On remeron at hs, cymbalta. No obvious depressive symptoms, stable. The current medical regimen is effective;  continue present plan and medications.     Benign essential hypertension  On losartan, amlodipine, metoprolol. Stable BPs. No changes in medications made    Hypothyroidism, unspecified type  On levothyroxine at PTA dose, no changes made. Stable, compensated. The current medical regimen is effective;  continue present plan and medications.    TSH   Date Value Ref Range Status   10/31/2017 4.79 (H) 0.40 - 4.00 mU/L Final   ]     VIRY (generalized anxiety disorder)  On buspirone at PTA dose, no changes made. Stable, compensated. The current medical regimen is effective;  continue present plan and medications.       PAST MEDICAL HISTORY:  has no past medical history on file.    DISCHARGE MEDICATIONS:  Current Outpatient Prescriptions   Medication Sig Dispense Refill     Acetaminophen (TYLENOL PO) Take 975 mg by mouth 3 times daily       METOPROLOL SUCCINATE ER PO Take 25 mg by mouth daily       miconazole (MICATIN; MICRO GUARD) 2 % powder Apply topically 2 times daily       ibuprofen (ADVIL/MOTRIN) 600 MG tablet Take 1 tablet (600 mg) by mouth every 6 hours as needed for moderate pain 120 tablet 3     levothyroxine (SYNTHROID/LEVOTHROID) 25 MCG tablet TAKE 1 TABLET (25 MCG) BY MOUTH DAILY 90 tablet 1     losartan (COZAAR) 25 MG tablet TAKE 1 TABLET (25 MG) BY MOUTH 2 TIMES DAILY 180 tablet 1     amLODIPine (NORVASC) 5 MG tablet TAKE 1 TABLET BY MOUTH TWICE DAILY 180 tablet 1     brimonidine (ALPHAGAN) 0.2 % ophthalmic solution Place 1 drop Into the left eye 2 times daily       busPIRone (BUSPAR) 10 MG tablet Take 10 mg by mouth 2 times daily 0800, 1700       VITAMIN D, CHOLECALCIFEROL, PO Take 2,000 Units by mouth daily At 1200       DULoxetine (CYMBALTA) 60 MG EC capsule Take 60 mg by mouth daily       mirtazapine (REMERON) 15 MG tablet Take 15 mg by mouth At Bedtime       order for DME Oxygen 2 Li/min  at night       latanoprost (XALATAN) 0.005 % ophthalmic solution Place 1 drop into both eyes At Bedtime 1 Bottle 4     multivitamin  with lutein (OCUVITE WITH LTEIN) CAPS Take 1 capsule by mouth daily At 1200       calcium-vitamin D (CALTRATE) 600-400 MG-UNIT per tablet Take 1 tablet by mouth 2 times daily At 1200, and 8 pm       docusate sodium (COLACE) 100 MG capsule Take 100 mg by mouth 3 times daily 0800, 1200, 2000         MEDICATION  CHANGES/RATIONALE:   Tylenol scheduled upon admission for pain control     Controlled medications sent with patient:   not applicable/none     ROS:    4 point ROS including Respiratory, CV, GI and , other than that noted in the HPI,  is negative    Physical Exam:   Vitals: /86  Pulse 85  Temp 97.8  F (36.6  C)  Resp 18  Wt 164 lb (74.4 kg)  SpO2 93%  BMI 33.12 kg/m2  BMI= Body mass index is 33.12 kg/(m^2).    GENERAL APPEARANCE:  Alert, in no distress  RESP:  lungs clear to auscultation , no respiratory distress  CV:  regular rate and rhythm, no murmur, rub, or gallop  M/S:   Gait and station normal  PSYCH:  oriented X 3, memory impaired     DISCHARGE PLAN:  Occupational Therapy, Physical Therapy, Registered Nurse, Home Health Aide and From:  [facility] Care  Patient instructed to follow-up with:  PCP in 7-10 days       Current [place] scheduled appointments:  No future appointments.    MTM referral needed and placed by this provider: No    Pending labs: none  SNF labs none  Discharge Treatments:  1. Patient may discharge to [facility] with [facility] PT/OT/RN/HHA to eval and treat.  2. Follow up with PCP in 5-10 days  3. Follow up with ortho per their recommendations.  4. May discharge with all current meds and treatments    TOTAL DISCHARGE TIME:   Greater than 30 minutes  Electronically signed by:  ROSANGELA Kirk CNP       Documentation of Face to Face and Certification for Home Health Services    I certify that patient: Lara Wills is under my care and that I, or a nurse practitioner or physician's assistant working with me, had a face-to-face encounter that meets the physician face-to-face encounter requirements with this patient on: 3/5/2018.    This encounter with the patient was in whole, or in part, for the following medical condition, which is the primary reason for home health care: eft shoulder dislocation, closed supracondylar fx of left humerus, weakness, and UTI.    I  certify that, based on my findings, the following services are medically necessary home health services: Nursing, Occupational Therapy, Physical Therapy and HHA.    My clinical findings support the need for the above services because: Nurse is needed: To provide caregiver training to assist with: eft shoulder dislocation, closed supracondylar fx of left humerus, weakness, and UTI.., Occupational Therapy Services are needed to assess and treat cognitive ability and address ADL safety due to impairment in eft shoulder dislocation, closed supracondylar fx of left humerus, weakness, and UTI. and Physical Therapy Services are needed to assess and treat the following functional impairments: eft shoulder dislocation, closed supracondylar fx of left humerus, weakness, and UTI.    Further, I certify that my clinical findings support that this patient is homebound (i.e. absences from home require considerable and taxing effort and are for medical reasons or [employer] services or infrequently or of short duration when for other reasons) because: Requires assistance of another person or specialized equipment to access medical services because patient: Requires supervision of another for safe transfer...    Based on the above findings. I certify that this patient is confined to the home and needs intermittent skilled nursing care, physical therapy and/or speech therapy.  The patient is under my care, and I have initiated the establishment of the plan of care.  This patient will be followed by a physician who will periodically review the plan of care.  Physician/Provider to provide follow up care: Alejandra Watson    Attending hospital physician (the Medicare certified [place] provider): ROSANGELA Kirk CNP  Physician Signature: See electronic signature associated with these discharge orders.  Date: 3/5/2018

## 2018-03-09 NOTE — TELEPHONE ENCOUNTER
Reason for Call: Request for an order or referral:    Order or referral being requested: PT and OT to evaluate and treat Monday 3/12/13. Also need order to D/C RN    Date needed: as soon as possible    Phone number Patient can be reached at:  Other phone number:  Yeni - [phone]    Best Time:  any    Can we leave a detailed message on this number?      Call taken on 3/9/2018 at 4:09 PM by Jami Díaz

## 2018-03-13 NOTE — TELEPHONE ENCOUNTER
Reason for Call:  Other Verbal Orders    Detailed comments: Jennifer is looking for verbal orders to continue PT with Lara.  She is looking for 3 times this week, 2 times per week for 5 weeks and 1 time per week for 1 week.  Please advise.    Phone Number Patient can be reached at: Other phone number:  Jennifer ([facility])  [phone]    Best Time: any    Can we leave a detailed message on this number? YES    Call taken on 3/13/2018 at 12:03 PM by Tracy Casillas

## 2018-03-26 NOTE — PROGRESS NOTES
"  SUBJECTIVE:   Lara Wills is a 89 year old female who presents to clinic today for the following health issues:          Hospital Follow-up Visit:    Hospital/Nursing Home/ Rehab Facility: [facility]  Date of Admission: 2/6/18  Date of Discharge: 3/6/18  Reason(s) for Admission:   DISCHARGE DIAGNOSIS:   1. Shoulder dislocation, left, initial encounter    2. Closed supracondylar fracture of left humerus with routine healing, subsequent encounter    3. Fall, subsequent encounter    4. Mild cognitive impairment    5. Delirium    6. Major depressive disorder, single episode, mild (H)    7. Benign essential hypertension    8. Hypothyroidism, unspecified type    9. Hypoxemia    10. Generalized anxiety disorder                  Problems taking medications regularly:  None       Medication changes since discharge: None       Problems adhering to non-medication therapy:  None    Summary of hospitalization:  [place] hospital discharge summary reviewed  Diagnostic Tests/Treatments reviewed.  Follow up needed: PT  Other Healthcare Providers Involved in Patient s Care:         Physical Therapy  Update since discharge: improved.     Post Discharge Medication Reconciliation: discharge medications reconciled, continue medications without change.  Plan of care communicated with patient and family     Coding guidelines for this visit:  Type of Medical   Decision Making Face-to-Face Visit       within 7 Days of discharge Face-to-Face Visit        within 14 days of discharge   Moderate Complexity 18601 [id number]   High Complexity 39880 [id number]            Overall doing well.  Discharge summary reviewed.  Needs some medications refilled.  She is back in her apartement with homecare and PT coming to see her.      /69  Pulse 82  Temp 98  F (36.7  C) (Tympanic)  Resp 18  Ht 4' 11\" (1.499 m)  Wt 161 lb (73 kg)  Breastfeeding? No  BMI 32.52 kg/m2  EXAM: GENERAL APPEARANCE: Alert, no acute distress  RESP: lungs clear " to auscultation   CV: regular rhythm, rate-normal, grade 3/6  systolic murmur heard best over the rusb  ABDOMEN: soft, no organomegaly, masses or tenderness  MS: extremities normal, no peripheral edema    ASSESSMENT/PLAN:      ICD-10-CM    1. Closed supracondylar fracture of left humerus with routine healing, subsequent encounter S42.412D    2. Candidal intertrigo B37.2 miconazole (MICATIN; MICRO GUARD) 2 % powder   3. Benign essential hypertension I10 metoprolol succinate (TOPROL-XL) 25 MG 24 hr tablet   4. Hypothyroidism, unspecified type E03.9 levothyroxine (SYNTHROID/LEVOTHROID) 25 MCG tablet     Doing well overall.  Continue current medications and cares.   Seems to be doing well at home in her current living situation.     Alejandra Watson M.D.    Patient Instructions         Thank you for choosing [facility].  You may be receiving a survey in the mail from [employer] [place] [facility] regarding your visit today.  Please take a few minutes to complete and return the survey to let us know how we are doing.      Our Clinic hours are:  Mondays    7:20 am - 7 pm  Tues -  Fri  7:20 am - 5 pm    Clinic Phone: [phone]    The clinic lab opens at 7:30 am Mon - Fri and appointments are required.    [facility]. [phone]  Monday-Thursday 8 am - 7pm  Tues/Wed/Fri 8 am - 5:30 pm

## 2018-03-26 NOTE — MR AVS SNAPSHOT
After Visit Summary   3/26/2018    Lara Wills    MRN: [id number]           Patient Information     Date Of Birth          6/12/1922        Visit Information        Provider Department      3/26/2018 1:40 PM Alejandra Watson MD [facility] [place][note 2]        Today's Diagnoses     Closed supracondylar fracture of left humerus with routine healing, subsequent encounter    -  1    Candidal intertrigo        Benign essential hypertension        Hypothyroidism, unspecified type          Care Instructions          Thank you for choosing [facility] [place] [facility].  You may be receiving a survey in the mail from [employer] regarding your visit today.  Please take a few minutes to complete and return the survey to let us know how we are doing.      Our Clinic hours are:  Mondays    7:20 am - 7 pm  Tues -  Fri  7:20 am - 5 pm    Clinic Phone: [phone]    The clinic lab opens at 7:30 am Mon - Fri and appointments are required.    [facility]  Ph. [phone]  Monday-Thursday 8 am - 7pm  Tues/Wed/Fri 8 am - 5:30 pm                 Follow-ups after your visit        Who to contact     If you have questions or need follow up information about today's clinic visit or your schedule please contact [facility] directly at [phone].  Normal or non-critical lab and imaging results will be communicated to you by MyChart, letter or phone within 4 business days after the clinic has received the results. If you do not hear from us within 7 days, please contact the clinic through [employer] or phone. If you have a critical or abnormal lab result, we will notify you by phone as soon as possible.  Submit refill requests through [employer] or call your pharmacy and they will forward the refill request to us. Please allow 3 business days for your refill to be completed.          Additional Information About Your Visit        [employer] Information     [employer] gives you secure access to your  "electronic health record. If you see a primary care provider, you can also send messages to your care team and make appointments. If you have questions, please call your primary care clinic.  If you do not have a primary care provider, please call [phone] and they will assist you.        Care EveryWhere ID     This is your Care EveryWhere ID. This could be used by other organizations to access your [place] medical records  [id number]        Your Vitals Were     Pulse Temperature Respirations Height Breastfeeding? BMI (Body Mass Index)    82 98  F (36.7  C) (Tympanic) 18 4' 11\" (1.499 m) No 32.52 kg/m2       Blood Pressure from Last 3 Encounters:   03/26/18 109/69   03/05/18 136/86   02/22/18 134/75    Weight from Last 3 Encounters:   03/26/18 161 lb (73 kg)   03/05/18 164 lb (74.4 kg)   02/22/18 165 lb (74.8 kg)              Today, you had the following     No orders found for display         Today's Medication Changes          These changes are accurate as of 3/26/18  2:13 PM.  If you have any questions, ask your nurse or doctor.               These medicines have changed or have updated prescriptions.        Dose/Directions    levothyroxine 25 MCG tablet   Commonly known as:  SYNTHROID/LEVOTHROID   This may have changed:  See the new instructions.   Used for:  Hypothyroidism, unspecified type   Changed by:  Alejandra Watson MD        TAKE 1 TABLET (25 MCG) BY MOUTH DAILY   Quantity:  90 tablet   Refills:  1       metoprolol succinate 25 MG 24 hr tablet   Commonly known as:  TOPROL-XL   This may have changed:  medication strength   Used for:  Benign essential hypertension   Changed by:  Alejandra Watson MD        Dose:  25 mg   Take 1 tablet (25 mg) by mouth daily   Quantity:  90 tablet   Refills:  1            Where to get your medicines      These medications were sent to [place] [address] [place] [address] [address]     Phone:  [phone]     " levothyroxine 25 MCG tablet    metoprolol succinate 25 MG 24 hr tablet    miconazole 2 % powder                Primary Care Provider Office Phone # Fax #    Alejandra Watson [phone] [address]  [facility] [address]        Equal Access to Services     ARETHASHYANN TYREL : Hadii aad ku hadasho Soomaali, waaxda luqadaha, qaybta kaalmada adeegyada, waxay cassandrain hayreeman adejuventino arnold lakindraeryn amador. So [facility] [phone].    ATENCIÓN: Si habla español, tiene a ro disposición servicios gratuitos de asistencia lingüística. Llame al [phone].    We comply with applicable federal civil rights laws and [place] laws. We do not discriminate on the basis of race, color, national origin, age, disability, sex, sexual orientation, or gender identity.            Thank you!     Thank you for choosing [facility]  for your care. Our goal is always to provide you with excellent care. Hearing back from our patients is one way we can continue to improve our services. Please take a few minutes to complete the written survey that you may receive in the mail after your visit with us. Thank you!             Your Updated Medication List - Protect others around you: Learn how to safely use, store and throw away your medicines at www.disposemymeds.org.          This list is accurate as of 3/26/18  2:13 PM.  Always use your most recent med list.                   Brand Name Dispense Instructions for use Diagnosis    amLODIPine 5 MG tablet    NORVASC    180 tablet    TAKE 1 TABLET BY MOUTH TWICE DAILY    Benign essential hypertension       brimonidine 0.2 % ophthalmic solution    ALPHAGAN     Place 1 drop Into the left eye 2 times daily        busPIRone 10 MG tablet    BUSPAR     Take 10 mg by mouth 2 times daily 0800, 1700        calcium-vitamin D 600-400 MG-UNIT per tablet    CALTRATE     Take 1 tablet by mouth 2 times daily At 1200, and 8 pm        docusate sodium 100 MG capsule    COLACE     Take 100  mg by mouth 3 times daily 0800, 1200, 2000        DULoxetine 60 MG EC capsule    CYMBALTA     Take 60 mg by mouth daily        ibuprofen 600 MG tablet    ADVIL/MOTRIN    120 tablet    Take 1 tablet (600 mg) by mouth every 6 hours as needed for moderate pain    Closed supracondylar fracture of left humerus with routine healing, subsequent encounter       latanoprost 0.005 % ophthalmic solution    XALATAN    1 Bottle    Place 1 drop into both eyes At Bedtime        levothyroxine 25 MCG tablet    SYNTHROID/LEVOTHROID    90 tablet    TAKE 1 TABLET (25 MCG) BY MOUTH DAILY    Hypothyroidism, unspecified type       losartan 25 MG tablet    COZAAR    180 tablet    TAKE 1 TABLET (25 MG) BY MOUTH 2 TIMES DAILY    Benign essential hypertension       metoprolol succinate 25 MG 24 hr tablet    TOPROL-XL    90 tablet    Take 1 tablet (25 mg) by mouth daily    Benign essential hypertension       miconazole 2 % powder    MICATIN; MICRO GUARD    90 g    Apply topically 2 times daily    Candidal intertrigo       multivitamin  with lutein Caps per capsule      Take 1 capsule by mouth daily At 1200        order for DME      Oxygen 2 Li/min at night    Other secondary hypertension       REMERON 15 MG tablet   Generic drug:  mirtazapine      Take 15 mg by mouth At Bedtime        TYLENOL PO      Take 975 mg by mouth 3 times daily        VITAMIN D (CHOLECALCIFEROL) PO      Take 2,000 Units by mouth daily At 1200

## 2018-03-26 NOTE — NURSING NOTE
"Chief Complaint   Patient presents with     Hospital F/U       Initial /69  Pulse 82  Temp 98  F (36.7  C) (Tympanic)  Resp 18  Ht 4' 11\" (1.499 m)  Wt 161 lb (73 kg)  Breastfeeding? No  BMI 32.52 kg/m2 Estimated body mass index is 32.52 kg/(m^2) as calculated from the following:    Height as of this encounter: 4' 11\" (1.499 m).    Weight as of this encounter: 161 lb (73 kg).  Medication Reconciliation: complete    "

## 2018-03-26 NOTE — PATIENT INSTRUCTIONS
Thank you for choosing [facility].  You may be receiving a survey in the mail from [facility] regarding your visit today.  Please take a few minutes to complete and return the survey to let us know how we are doing.      Our Clinic hours are:  Mondays    7:20 am - 7 pm  Tues -  Fri  7:20 am - 5 pm    Clinic Phone: [phone]    The clinic lab opens at 7:30 am Mon - Fri and appointments are required.    [facility]. [phone]  Monday-Thursday 8 am - 7pm  Tues/Wed/Fri 8 am - 5:30 pm

## 2018-04-12 NOTE — TELEPHONE ENCOUNTER
Reason for Call:  Verbal orders    Detailed comments: Violet brennan Clint at [facility] is calling to get Verbal Orders for PT for this Patient Starting the week of 4/22 1 vist that week, 4/29 2 visits that week, 5/6 1 time that week.    Phone Number Patient can be reached at: Other phone number: [phone]    Best Time: any    Can we leave a detailed message on this number? YES this is a confidential VM    Call taken on 4/12/2018 at 3:46 PM by Heidi Cornell

## 2018-05-03 NOTE — TELEPHONE ENCOUNTER
Reason for Call: Orders    Detailed comments: Violet from [facility] is calling and stating that she would like to get VO to add to the plan of care.This patient is having Left SI joint pain, and Violet wants to do exercise and manual therapy for this. You can call Violet.    Phone Number Patient can be reached at: Other phone number:  [phone]    Best Time: any    Can we leave a detailed message on this number? YES this is a confidential voice mail.  Heidi Cornell  [facility]      Call taken on 5/3/2018 at 3:49 PM by Heidi Cornell

## 2018-05-09 NOTE — TELEPHONE ENCOUNTER
Reason for Call: Request for an order or referral:    Order or referral being requested: order    Date needed: as soon as possible    Has the patient been seen by the PCP for this problem? YES    Additional comments: Violet with home care calling to get verbal orders to continue PT for pt. They just did her recert. She would like 2 times a week for 3 weeks, 1 time a week for 2 weeks.     Phone number Patient can be reached at: Violet [phone]     Best Time:  any    Can we leave a detailed message on this number?  YES    Call taken on 5/9/2018 at 4:37 PM by Margarita Steward

## 2018-05-22 NOTE — TELEPHONE ENCOUNTER
Violet requesting verbal order for her to educate pt on use of heating pad or ice for left hip pain. Home care wanting to try at home pain management techniques (no change in status). Ok given per [facility] policy. Pt's last OV 3/26/18.    Molly ESTRADA RN

## 2018-05-22 NOTE — TELEPHONE ENCOUNTER
Reason for Call: Request for an order or referral:     Order or referral being requested: order     Date needed: as soon as possible     Has the patient been seen by the PCP for this problem? YES     Additional comments: Violet with home care calling to get verbal orders for ice and heat for pain.       Phone number Patient can be reached at: Violet [phone]      Best Time:  any     Can we leave a detailed message on this number?  YES

## 2018-06-06 NOTE — TELEPHONE ENCOUNTER
"Requested Prescriptions   Pending Prescriptions Disp Refills     amLODIPine (NORVASC) 5 MG tablet [Pharmacy Med Name: AMLODIPINE BESYLATE 5 MG TAB]  Last Written Prescription Date:  11/20/17  Last Fill Quantity: 180,  # refills: 1   Last office visit: 3/26/2018 with prescribing provider:  03/26/18   Future Office Visit:     180 tablet 1     Sig: TAKE 1 TABLET BY MOUTH TWICE DAILY    Calcium Channel Blockers Protocol  Passed    6/6/2018  3:49 AM       Passed - Blood pressure under 140/90 in past 12 months    BP Readings from Last 3 Encounters:   03/26/18 109/69   03/05/18 136/86   02/22/18 134/75          Passed - Recent (12 mo) or future (30 days) visit within the authorizing provider's specialty    Patient had office visit in the last 12 months or has a visit in the next 30 days with authorizing provider or within the authorizing provider's specialty.  See \"Patient Info\" tab in inbasket, or \"Choose Columns\" in Meds & Orders section of the refill encounter.           Passed - Patient is age 18 or older       Passed - No active pregnancy on record       Passed - Normal serum creatinine on file in past 12 months    Recent Labs   Lab Test  02/03/18   0950   CR  0.78          Passed - No positive pregnancy test in past 12 months        losartan (COZAAR) 25 MG tablet [Pharmacy Med Name: LOSARTAN POTASSIUM 25 MG TAB]  Last Written Prescription Date:  12/08/17  Last Fill Quantity: 180,  # refills: 1   Last office visit: 3/26/2018 with prescribing provider:  03/26/18   Future Office Visit:     180 tablet 1     Sig: TAKE 1 TABLET (25 MG) BY MOUTH 2 TIMES DAILY    Angiotensin-II Receptors Passed    6/6/2018  3:49 AM       Passed - Blood pressure under 140/90 in past 12 months    BP Readings from Last 3 Encounters:   03/26/18 109/69   03/05/18 136/86   02/22/18 134/75          Passed - Recent (12 mo) or future (30 days) visit within the authorizing provider's specialty    Patient had office visit in the last 12 months or has a " "visit in the next 30 days with authorizing provider or within the authorizing provider's specialty.  See \"Patient Info\" tab in inbasket, or \"Choose Columns\" in Meds & Orders section of the refill encounter.         Passed - Patient is age 18 or older       Passed - No active pregnancy on record       Passed - Normal serum creatinine on file in past 12 months    Recent Labs   Lab Test  02/03/18   0950   CR  0.78          Passed - Normal serum potassium on file in past 12 months    Recent Labs   Lab Test  02/03/18   0950   POTASSIUM  3.5          Passed - No positive pregnancy test in past 12 months          "

## 2018-06-12 NOTE — TELEPHONE ENCOUNTER
Reason for Call:  Home Health Care    Violet with [facility] called regarding (reason for call): Verbal Orders    Orders are needed for this patient.     PT: Asking for 1 more visit this week for sciatic pain.  Then 2x a week x 2 weeks and then 1x a week x 1 week.  Please call Violet with verbal orders.      Pt Provider: Walter    Phone Number Homecare Nurse can be reached at: [phone]    Can we leave a detailed message on this number? YES    Phone number patient can be reached at: Home number on file [phone] (home)    Best Time: any    Call taken on 6/12/2018 at 1:58 PM by Rossana Gutierrez

## 2018-08-16 NOTE — MR AVS SNAPSHOT
After Visit Summary   8/16/2018    Lara Wills    MRN: [id number]           Patient Information     Date Of Birth          6/12/1922        Visit Information        Provider Department      8/16/2018 7:40 AM Alejandra Watson MD [facility]        Today's Diagnoses     Chronic left-sided low back pain with left-sided sciatica    -  1    Hypothyroidism, unspecified type        Benign essential hypertension        Hyperlipidemia with target LDL less than 130          Care Instructions    Increase Tylenol to 1000 mg three times daily    Do your home stretches given by physical therapy      Thank you for choosing [facility].  You may be receiving a survey in the mail from [employer] regarding your visit today.  Please take a few minutes to complete and return the survey to let us know how we are doing.      Our Clinic hours are:  Mondays    7:20 am - 7 pm  Tues - Fri  7:20 am - 5 pm    Clinic Phone: [phone]    The clinic lab opens at 7:30 am Mon - Fri and appointments are required.    [facility]  Ph. [phone]  Monday  8 am - 7pm  Tues - Fri 8 am - 5:30 pm                 Follow-ups after your visit        Who to contact     If you have questions or need follow up information about today's clinic visit or your schedule please contact [facility] directly at [phone].  Normal or non-critical lab and imaging results will be communicated to you by MyChart, letter or phone within 4 business days after the clinic has received the results. If you do not hear from us within 7 days, please contact the clinic through MyChart or phone. If you have a critical or abnormal lab result, we will notify you by phone as soon as possible.  Submit refill requests through [employer] or call your pharmacy and they will forward the refill request to us. Please allow 3 business days for your refill to be completed.          Additional Information About Your  "Visit        MyChart Information     [employer] gives you secure access to your electronic health record. If you see a primary care provider, you can also send messages to your care team and make appointments. If you have questions, please call your primary care clinic.  If you do not have a primary care provider, please call [phone] and they will assist you.        Care EveryWhere ID     This is your Care EveryWhere ID. This could be used by other organizations to access your [place] medical records  [id number]        Your Vitals Were     Pulse Temperature Respirations Height Pulse Oximetry Breastfeeding?    83 98.5  F (36.9  C) (Tympanic) 18 4' 11\" (1.499 m) 95% No    BMI (Body Mass Index)                   32.52 kg/m2            Blood Pressure from Last 3 Encounters:   08/16/18 138/74   03/26/18 109/69   03/05/18 136/86    Weight from Last 3 Encounters:   08/16/18 161 lb (73 kg)   03/26/18 161 lb (73 kg)   03/05/18 164 lb (74.4 kg)              We Performed the Following     Basic metabolic panel     DEPRESSION ACTION PLAN (DAP)     Lipid panel reflex to direct LDL Fasting     TSH with free T4 reflex          Where to get your medicines      These medications were sent to [place] [address] [place] [address] [address]     Phone:  [phone]     levothyroxine 25 MCG tablet    metoprolol succinate 25 MG 24 hr tablet          Primary Care Provider Office Phone # Fax #    Alejandra Watson [phone] [address] [facility] [address]        Equal Access to Services     [facility] : Hadii aad ku hadasho Soomaali, waaxda luqadaha, qaybta kaalmada patric samson. So [facility] [phone].    ATENCIÓN: Si habla español, tiene a ro disposición servicios gratuitos de asistencia lingüística. Llame al [phone].    We comply with applicable federal civil rights laws and [place] laws. We do not " discriminate on the basis of race, color, national origin, age, disability, sex, sexual orientation, or gender identity.            Thank you!     Thank you for choosing [facility]  for your care. Our goal is always to provide you with excellent care. Hearing back from our patients is one way we can continue to improve our services. Please take a few minutes to complete the written survey that you may receive in the mail after your visit with us. Thank you!             Your Updated Medication List - Protect others around you: Learn how to safely use, store and throw away your medicines at www.disposemymeds.org.          This list is accurate as of 8/16/18  8:11 AM.  Always use your most recent med list.                   Brand Name Dispense Instructions for use Diagnosis    amLODIPine 5 MG tablet    NORVASC    180 tablet    TAKE 1 TABLET BY MOUTH TWICE DAILY    Benign essential hypertension       brimonidine 0.2 % ophthalmic solution    ALPHAGAN     Place 1 drop Into the left eye 2 times daily        busPIRone 10 MG tablet    BUSPAR     Take 10 mg by mouth 2 times daily 0800, 1700        calcium-vitamin D 600-400 MG-UNIT per tablet    CALTRATE     Take 1 tablet by mouth 2 times daily At 1200, and 8 pm        docusate sodium 100 MG capsule    COLACE     Take 100 mg by mouth 3 times daily 0800, 1200, 2000        DULoxetine 60 MG EC capsule    CYMBALTA     Take 60 mg by mouth daily        latanoprost 0.005 % ophthalmic solution    XALATAN    1 Bottle    Place 1 drop into both eyes At Bedtime        levothyroxine 25 MCG tablet    SYNTHROID/LEVOTHROID    90 tablet    TAKE 1 TABLET (25 MCG) BY MOUTH DAILY    Hypothyroidism, unspecified type       losartan 25 MG tablet    COZAAR    180 tablet    TAKE 1 TABLET (25 MG) BY MOUTH 2 TIMES DAILY    Benign essential hypertension       metoprolol succinate 25 MG 24 hr tablet    TOPROL-XL    90 tablet    Take 1 tablet (25 mg) by mouth daily    Benign essential  hypertension       multivitamin  with lutein Caps per capsule      Take 1 capsule by mouth daily At 1200        order for DME      Oxygen 2 Li/min at night    Other secondary hypertension       REMERON 15 MG tablet   Generic drug:  mirtazapine      Take 15 mg by mouth At Bedtime        VITAMIN D (CHOLECALCIFEROL) PO      Take 2,000 Units by mouth daily At 1200

## 2018-08-16 NOTE — PATIENT INSTRUCTIONS
Increase Tylenol to 1000 mg three times daily    Do your home stretches given by physical therapy      Thank you for choosing [facility].  You may be receiving a survey in the mail from Cedrick Alvarado regarding your visit today.  Please take a few minutes to complete and return the survey to let us know how we are doing.      Our Clinic hours are:  Mondays    7:20 am - 7 pm  Tues -  Fri  7:20 am - 5 pm    Clinic Phone: [phone]    The clinic lab opens at 7:30 am Mon - Fri and appointments are required.    [facility] [place]  Ph. [phone]  Monday  8 am - 7pm  Tues - Fri 8 am - 5:30 pm

## 2018-08-16 NOTE — PROGRESS NOTES
"  SUBJECTIVE:   Lara Wills is a 89 year old female who presents to clinic today for the following health issues:      Chief Complaint   Patient presents with     Back Pain     Patient was seen in PT in Novemeber and had Naproxen which helped and then it re-started in May. Patient is having left side pain that will travel down leg rated at a 8/10. Patient has been taking 650 bid of tylenol which is of help.      In November the PT helped.    In May - PT didn't help and Naproxen didn't help either.    Takes 650 mg of tylenol daily and sometimes twice a day.  It does help a good deal.    She has tried gabapentin in the past without much success.     Doesn't do her home exercises given by PT.         Hypertension Follow-up      Outpatient blood pressures are being checked - normal    Low Salt Diet: no added salt    Hypothyroidism Follow-up      Since last visit, patient describes the following symptoms: Weight stable, no hair loss, no skin changes, no constipation, no loose stools          /74  Pulse 83  Temp 98.5  F (36.9  C) (Tympanic)  Resp 18  Ht 4' 11\" (1.499 m)  Wt 161 lb (73 kg)  SpO2 95%  Breastfeeding? No  BMI 32.52 kg/m2  EXAM: GENERAL APPEARANCE: Alert, no acute distress  RESP: lungs clear to auscultation   CV: regular rhythm, rate-normal, grade 4/6  systolic murmur heard best over the lusb  ABDOMEN: soft, no organomegaly, masses or tenderness  MS: left piriformis pain on palpation  Lower extremity strength is 5/5.   Sensation decreased on the left subjectively      ASSESSMENT/PLAN:      ICD-10-CM    1. Chronic left-sided low back pain with left-sided sciatica M54.42     G89.29    2. Hypothyroidism, unspecified type E03.9 levothyroxine (SYNTHROID/LEVOTHROID) 25 MCG tablet     TSH with free T4 reflex   3. Benign essential hypertension I10 metoprolol succinate (TOPROL-XL) 25 MG 24 hr tablet     Basic metabolic panel   4. Hyperlipidemia with target LDL less than 130 E78.5 Lipid panel reflex to " direct LDL Fasting     Would like to avoid stronger medications.  Up the tylenol to 1000 mg three times daily.    Patient Instructions   Increase Tylenol to 1000 mg three times daily    Do your home stretches given by physical therapy      Thank you for choosing [facility].  You may be receiving a survey in the mail from Cedrick Alvarado regarding your visit today.  Please take a few minutes to complete and return the survey to let us know how we are doing.      Our Clinic hours are:  Mondays    7:20 am - 7 pm  Tues -  Fri  7:20 am - 5 pm    Clinic Phone: [phone]    The clinic lab opens at 7:30 am Mon - Fri and appointments are required.    [facility] [place]  Ph. [phone]  Monday  8 am - 7pm  Tues - Fri 8 am - 5:30 pm

## 2018-08-16 NOTE — PROGRESS NOTES
Kidney function overall stable.    TSH is increased, we need to increase the levothyroxine to 50 mcg daily.  New prescription sent to pharmacy.    Cholesterol is acceptable.    Alejandra Watson M.D.

## 2018-09-12 NOTE — TELEPHONE ENCOUNTER
Lara Wills would like to request a referral.      Reason: PT at home for continued Left Sciatica Pain     Requested provider: [facility]     Comment:     This message is being sent by Sierra Wills on behalf of Lara Watson,  Lara would like to receive Physical therapy once again.  If she meets the criteria for this could you please send a referral and order to [facility]  [phone].  Thank you.  Sierra             Please fax PT referral    Alejandra Watson M.D.

## 2018-09-19 NOTE — TELEPHONE ENCOUNTER
Reason for Call:  orders    Detailed comments: VO needed for PT for 3 times a week for 2 weeks, 2 times a week for 4 weeks, and 1 time a week for 3 weeks. Please call Violet at [facility]. [phone]      Best Time: any    Can we leave a detailed message on this number? YES   Heidi Cornell  Clinic Station [place] [facility]      Call taken on 9/19/2018 at 1:39 PM by Heidi Cornell

## 2018-11-02 NOTE — TELEPHONE ENCOUNTER
rosie lam physical therapist from [facility] called looking for verbal orders for extending physical therapy about back pain. 1x a week for 1 week and 2x a week for 2 weeks.     Madhuri MEMBRENO  Station

## 2018-11-06 NOTE — TELEPHONE ENCOUNTER
Left VM for patient to schedule L4-L5 WEST      Violet MATA    [facility]

## 2018-11-06 NOTE — TELEPHONE ENCOUNTER
Received 11-6-2018 at 8:58am    Incoming fax from RENE Reis with [facility] for an L4-L5 WEST for low back pain.  Routing to scheduling coordinators to schedule at [facility].    Kelsi Gonzales  Patient Representative  [facility] [place]

## 2018-11-07 NOTE — TELEPHONE ENCOUNTER
Pre-screening Questions for Radiology Injections:    Injection to be done at which interventional clinic site? [facility]    Instruct patient to arrive as directed prior to the scheduled appointment time:    [place]: 30 minutes before      Procedure ordered by [facility]    Procedure ordered? Lumbar Epidural Steroid Injection    What insurance would patient like us to bill for this procedure? MEDICA      Worker's comp or MVA (motor vehicle accident) -Any injection DO NOT SCHEDULE and route to Violet Jean.      [employer] - For SI joint injections, DO NOT SCHEDULE and route Violet Jean. [employer] FREEDOM NO PA REQUIRED EFFECTIVE 11/1/2017      [employer]- MBB's must be scheduled at LEAST two weeks apart      [employer] - Any injection besides hip/shoulder/knee joint DO NOT SCHEDULE and route to Violet Ted. She will obtain PA and call pt back to schedule procedure or notify pt of denial.        [employer]-Route to Violet for review    Any chance of pregnancy? NO   If YES, do NOT schedule and route to RN pool    Is an  needed? No     Patient has a drive home? (mandatory) YES:     Is patient taking any blood thinners (plavix, coumadin, jantoven, warfarin, heparin, pradaxa or dabigatran )? No   If hold needed, do NOT schedule, route to RN pool     Is patient taking any aspirin products (includes Excedrin and Fiorinal)? No     If more than 325mg/day do NOT schedule; route to RN pool     For CERVICAL procedures, hold all aspirin products for 6 days.     Tell pt that if aspirin product is not held for 6 days, the procedure WILL BE cancelled.      Does the patient have a bleeding or clotting disorder? No     If YES, okay to schedule AND route to RN nurse pool    For any patients with platelet count <100, must be forwarded to provider    Is patient diabetic?  No  If YES, have them bring their glucometer.    Does patient have an active infection or treated for one within the past  week? No     Is patient currently taking any antibiotics?  No     For patients on chronic, preventative, or prophylactic antibiotics, procedures may be scheduled.     For patients on antibiotics for active or recent infection:    Brian Hitchcock Burton, [place]-antibiotic course must have been completed for 4 days    Is patient currently taking any steroid medications? (i.e. Prednisone, Medrol)  No     For patients on steroid medications:    Brian Hitchcock Burton, [place]-steroid course must have been completed for 4 days    Reviewed with patient:  If you are started on any steroids or antibiotics between now and your appointment, you must contact us because the procedure may need to be cancelled.  Yes    Is patient actively being treated for cancer or immunocompromised? No  If YES, do NOT schedule and route to RN pool     Are you able to get on and off an exam table with minimal or no assistance? Yes  If NO, do NOT schedule and route to RN pool    Are you able to roll over and lay on your stomach with minimal or no assistance? Yes  If NO, do NOT schedule and route to RN pool     Any allergies to contrast dye, iodine, shellfish, or numbing and steroid medications? No  If YES, route to RN pool AND add allergy information to appointment notes    Allergies: Codeine sulfate and Penicillins      Has the patient had a flu shot or any other vaccinations within 7 days before or after the procedure.  No     Does patient have an MRI/CT?  YES:   (SI joint, hip injections, lumbar sympathetic blocks, and stellate ganglion blocks do not require an MRI)    Was the MRI done w/in the last 3 years?  Yes    Was MRI done at [place]? Yes      If not, where was it done? N/A       If MRI was not done at [place], [facility] or [facility] do NOT schedule and route to nursing.  If pt has an imaging disc, the injection may be scheduled but pt has to bring disc to appt. If they show up w/out disc the injection cannot be  done    Reminders (please tell patient if applicable):       Instructed pt to arrive 30 minutes early for IV start if this is for a cervical procedure, ALL sympathetic (stellate ganglion, hypogastric, or lumbar sympathetic block) and all sedation procedures (RFA, spinal cord stimulation trials).  Not Applicable   -IVs are not routinely placed for Dr. Mckinney cervical cases   -Dr. Garcia: IVs for cervical ESIs and cervical TBDs (not CMBBs/facet inj)      If NPO for sedation, informed patient that it is okay to take medications with sips of water (except if they are to hold blood thinners).  Not Applicable   *DO take blood pressure medication if it is prescribed*      If this is for a cervical WEST, informed patient that aspirin needs to be held for 6 days.   Not Applicable      For all patients not having spinal cord stimulator (SCS) trials or radiofrequency ablations (RFAs), informed patient:    IV sedation is not provided for this procedure.  If you feel that an oral anti-anxiety medication is needed, you can discuss this further with your referring provider or primary care provider.  The Pain Clinic provider will discuss specifics of what the procedure includes at your appointment.  Most procedures last 10-20 minutes.  We use numbing medications to help with any discomfort during the procedure.  Not Applicable      Do not schedule procedures requiring IV placement in the first appointment of the day or first appointment after lunch. Do NOT schedule at 0745, 0815 or 1245.       For patients 85 or older we recommend having an adult stay w/ them for the remainder of the day.       Does the patient have any questions?    Violet Jean  [facility]

## 2018-11-21 NOTE — TELEPHONE ENCOUNTER
Reason for Call:  orders    Detailed comments: Violet from [facility] is calling and stating that they need VO for PT 1 time a week for 5 weeks as the patient is having an injection this coming Monday. Please call Violet .    Phone Number Patient can be reached at: Other phone number:  [phone]    Best Time: any    Can we leave a detailed message on this number? YES   Heidi Cornell  Clinic Station  [facility]      Call taken on 11/21/2018 at 9:22 AM by Heidi Cornell

## 2018-11-26 NOTE — NURSING NOTE
Pre-procedure Intake    Have you been fasting? NA    If yes, for how long? NA    Are you taking a prescribed blood thinner such as coumadin, Plavix, Xarelto?    No    If yes, when did you take your last dose? NA    Do you take aspirin?  No    If cervical procedure, have you held aspirin for 6 days?   NA    Do you have any allergies to contrast dye, iodine, steroid and/or numbing medications?  NO    Are you currently taking antibiotics or have an active infection?  NO    Have you had a fever/elevated temperature within the past week? NO    Are you currently taking oral steroids? NO    Do you have a ? Yes       Are you pregnant or breastfeeding?  NO    Are the vital signs normal?  Yes      Joann Huizar CMA ([facility])

## 2018-11-26 NOTE — PROGRESS NOTES
Pre procedure Diagnosis: lumbar spondylosis and lumbar radiculopathy   Post procedure Diagnosis: Same  Procedure performed: attempted left L4 transforaminal epidural steroid injection , change to L5-S1 interlaminar epidural steroid injection   Anesthesia: none  Complications: none  Operators: Heena Torres MD and Josefina Christianson DO    Indications:   Lara Wills is a 89 year old female was sent by RENE Reis with [facility] for epidural steroid injection.  They have a history of low back pain and left posterior leg and calf pain. She denies numbness or history of falls. Exam shows strength in her left lower extremities as 5/5 and they have tried conservative treatment including medications and therapy.    MRI was done on 11/2/18 which showed   T12-L1: Broad-based disc bulge. No foraminal or spinal canal stenosis.        L1-L2: No significant spinal canal or foraminal stenosis.       L2-L3: Severe disc height loss with broad-based disc bulge which is  asymmetric to the right. No foraminal stenosis. Mild spinal canal  stenosis and lateral recess narrowing. The asymmetric bulge is in  close proximity to the exited right L2 nerve root (series 8 image 14).         L3-L4: Severe disc height loss with broad-based disc bulge which is  asymmetric to the right. No foraminal stenosis. Mild spinal canal  stenosis and lateral recess narrowing. The asymmetric bulge is in  close proximity to the exited right L3 nerve root (series 8 image 19).         L4-L5: Broad-based disc bulge is present in combination with moderate  to severe bilateral facet arthropathy. These changes result in severe  left and mild-moderate right foraminal stenosis. Moderate spinal canal  stenosis.       L5-S1: Slight disc uncovering is present. Mild right greater than left  foraminal stenosis. Moderate spinal canal stenosis. Bilateral facet  arthropathy.         IMPRESSION: Degenerative disc disease, most severe at L4-5.      Options/alternatives, benefits and risks were discussed with the patient including bleeding, infection, no pain relief, tissue trauma, exposure to radiation, reaction to medications, spinal cord injury, dural puncture, weakness, numbness and headache.  Questions were answered to her satisfaction and she agrees to proceed. Voluntary informed consent was obtained and signed.     Vitals were reviewed: Yes  Allergies were reviewed:  Yes   Medications were reviewed:  Yes   Pre-procedure pain score: 5/10    Procedure:  After getting informed consent, patient was brought into the procedure suite and was placed in a prone position on the procedure table.   A Pause for the Cause was performed.  Patient was prepped and draped in sterile fashion.     Reviewed left obliqued imaging at both L4 and L5 for transforaminal approach.  Osteopenia and facet arthropathy made ability to come in from this angle difficult.  L4 location attempted.   A total of 2ml lidocaine was used at the skin.  Fluoroscopy was used to guide the needle towards the neuroforamen.  Due to hypertrophy and an osteophyte, the needle was unable to enter on the left side at L4.      Due to facet hypertrophy and osteopenia, a left approach at L5 wasn't possible either.    The L5 - S1 interspace was identified with AP fluoroscopy.  A total of 2ml lidocaine was used for a right paramedian approach due to decrease space on the left.    A 20gauge 3.5 inch Touhy needle was advanced under intermittent fluoroscopy.  A GABRIELLE syringe was used to advance the needle into the epidural space.   After loss of resistance, there was no evidence of blood or CSF on aspiration. Location was verified with both AP and lateral fluoroscopic imaging.    A total of 3 ml of Omnipaque 300 was injected demonstrating appropriate epidural spread and was checked in both the AP and lateral views. 7 ml was wasted. There was no evidence of intravascular or intrathecal spread.    1 ml of 0.2%  Ropivicaine with 10 mg of dexamethasone and 2 ml of preservative free saline was injected.  The needle was removed from the epidural space, flushed with 1% lidocaine and removed.     Hemostasis was achieved, the area was cleaned, and bandaids were placed when appropriate.  The patient tolerated the procedure well, and was taken to the recovery room.    Images were saved to PACS.    Post-procedure pain score: 0/10  Follow-up includes:   -f/u phone call in one week  -f/u with referring provider  Patient to follow up with Primary Care provider regarding elevated blood pressure.     Heena Torres MD  [facility]

## 2018-11-26 NOTE — PATIENT INSTRUCTIONS
[facility]   Procedure Discharge Instructions    Today you saw:    Dr. Maria Dolores Torres      You had an:  Lumbar Epidural steroid injection      Medications used:  Lidocaine   Dexamethason   Omnipaque  Ropivicaine  Normal saline             Be cautious when walking. Numbness and/or weakness in the lower extremities may occur for up to 6-8 hours after the procedure due to effect of the local anesthetic    Do not drive for 6 hours. The effect of the local anesthetic could slow your reflexes.     You may resume your regular activities after 24 hours    Avoid strenuous activity for the first 24 hours    You may shower, however avoid swimming, tub baths or hot tubs for 24 hours following your procedure    You may have a mild to moderate increase in pain for several days following the injection.    It may take up to 14 days for the steroid medication to start working although you may feel the effect as early as a few days after the procedure.       You may use ice packs for 10-15 minutes, 3 to 4 times a day at the injection site for comfort    Do not use heat to painful areas for 6 to 8 hours. This will give the local anesthetic time to wear off and prevent you from accidentally burning your skin.     You may use anti-inflammatory medications (such as Ibuprofen or Aleve or Advil) or Tylenol for pain control if necessary    If you experience any of the following, call the Pain Clinic during work hours at [phone] or the Provider Line after hours at [phone]:  -Fever over 100 degree F  -Swelling, bleeding, redness, drainage, warmth at the injection site  -Progressive weakness or numbness in your legs   -Loss of bowel or bladder function  -Unusual new onset of pain that is not improving

## 2018-11-26 NOTE — MR AVS SNAPSHOT
After Visit Summary   11/26/2018    Lara Wills    MRN: [id number]           Patient Information     Date Of Birth          6/12/1922        Visit Information        Provider Department      11/26/2018 2:15 PM Maria Dolores Torres MD [facility] [place]        Care Instructions    [facility]   Procedure Discharge Instructions    Today you saw:    Dr. Maria Dolores Torres      You had an:  Lumbar Epidural steroid injection      Medications used:  Lidocaine   Dexamethason   Omnipaque  Ropivicaine              Be cautious when walking. Numbness and/or weakness in the lower extremities may occur for up to 6-8 hours after the procedure due to effect of the local anesthetic    Do not drive for 6 hours. The effect of the local anesthetic could slow your reflexes.     You may resume your regular activities after 24 hours    Avoid strenuous activity for the first 24 hours    You may shower, however avoid swimming, tub baths or hot tubs for 24 hours following your procedure    You may have a mild to moderate increase in pain for several days following the injection.    It may take up to 14 days for the steroid medication to start working although you may feel the effect as early as a few days after the procedure.       You may use ice packs for 10-15 minutes, 3 to 4 times a day at the injection site for comfort    Do not use heat to painful areas for 6 to 8 hours. This will give the local anesthetic time to wear off and prevent you from accidentally burning your skin.     You may use anti-inflammatory medications (such as Ibuprofen or Aleve or Advil) or Tylenol for pain control if necessary    If you experience any of the following, call the Pain Clinic during work hours at [phone] or the Provider Line after hours at [phone]:  -Fever over 100 degree F  -Swelling, bleeding, redness, drainage, warmth at the injection site  -Progressive weakness or numbness in your legs   -Loss  of bowel or bladder function  -Unusual new onset of pain that is not improving                Follow-ups after your visit        Who to contact     If you have questions or need follow up information about today's clinic visit or your schedule please contact [facility] directly at [phone].  Normal or non-critical lab and imaging results will be communicated to you by MyChart, letter or phone within 4 business days after the clinic has received the results. If you do not hear from us within 7 days, please contact the clinic through [employer] or phone. If you have a critical or abnormal lab result, we will notify you by phone as soon as possible.  Submit refill requests through [employer] or call your pharmacy and they will forward the refill request to us. Please allow 3 business days for your refill to be completed.          Additional Information About Your Visit        MyChart Information     [employer] gives you secure access to your electronic health record. If you see a primary care provider, you can also send messages to your care team and make appointments. If you have questions, please call your primary care clinic.  If you do not have a primary care provider, please call [phone] and they will assist you.        Care EveryWhere ID     This is your Care EveryWhere ID. This could be used by other organizations to access your [facility] medical records  [id number]        Your Vitals Were     Pulse                   80            Blood Pressure from Last 3 Encounters:   11/26/18 135/76   08/16/18 138/74   03/26/18 109/69    Weight from Last 3 Encounters:   08/16/18 73 kg (161 lb)   03/26/18 73 kg (161 lb)   03/05/18 74.4 kg (164 lb)              Today, you had the following     No orders found for display       Primary Care Provider Office Phone # Fax #    Alejandra Watson [phone] [address] [facility] [address]        Equal Access to Services     ABI SARAH :  Hadii aad ku hadwillo Sopadmajaali, waaxda luqadaha, qaybta kaalmada mali, patric cassandraadrián arnold lakindraeryn perry. So [facility] [phone].    ATENCIÓN: Si wilton pantoja, tiene a ro disposición servicios gratuitos de asistencia lingüística. Llame al [phone].    We comply with applicable federal civil rights laws and [place] laws. We do not discriminate on the basis of race, color, national origin, age, disability, sex, sexual orientation, or gender identity.            Thank you!     Thank you for choosing [facility]  for your care. Our goal is always to provide you with excellent care. Hearing back from our patients is one way we can continue to improve our services. Please take a few minutes to complete the written survey that you may receive in the mail after your visit with us. Thank you!             Your Updated Medication List - Protect others around you: Learn how to safely use, store and throw away your medicines at www.disposemymeds.org.          This list is accurate as of 11/26/18  2:46 PM.  Always use your most recent med list.                   Brand Name Dispense Instructions for use Diagnosis    amLODIPine 5 MG tablet    NORVASC    180 tablet    TAKE 1 TABLET BY MOUTH TWICE DAILY    Benign essential hypertension       brimonidine 0.2 % ophthalmic solution    ALPHAGAN     Place 1 drop Into the left eye 2 times daily        busPIRone 10 MG tablet    BUSPAR     Take 10 mg by mouth 2 times daily 0800, 1700        calcium carbonate 600 mg-vitamin D 400 units 600-400 MG-UNIT per tablet    CALTRATE     Take 1 tablet by mouth 2 times daily At 1200, and 8 pm        docusate sodium 100 MG capsule    COLACE     Take 100 mg by mouth 3 times daily 0800, 1200, 2000        DULoxetine 60 MG capsule    CYMBALTA     Take 60 mg by mouth daily        latanoprost 0.005 % ophthalmic solution    XALATAN    1 Bottle    Place 1 drop into both eyes At Bedtime        levothyroxine 50 MCG tablet     SYNTHROID/LEVOTHROID    90 tablet    Take 1 tablet (50 mcg) by mouth daily    Hypothyroidism, unspecified type       losartan 25 MG tablet    COZAAR    180 tablet    TAKE 1 TABLET (25 MG) BY MOUTH 2 TIMES DAILY    Benign essential hypertension       metoprolol succinate 25 MG 24 hr tablet    TOPROL-XL    90 tablet    Take 1 tablet (25 mg) by mouth daily    Benign essential hypertension       multivitamin  with lutein Caps per capsule      Take 1 capsule by mouth daily At 1200        order for DME      Oxygen 2 Li/min at night    Other secondary hypertension       REMERON 15 MG tablet   Generic drug:  mirtazapine      Take 15 mg by mouth At Bedtime        VITAMIN D (CHOLECALCIFEROL) PO      Take 2,000 Units by mouth daily At 1200

## 2018-11-26 NOTE — NURSING NOTE
Discharge Information    IV Discontiued Time:  NA    Amount of Fluid Infused:  NA    Discharge Criteria = When patient returns to baseline or as per MD order    Consciousness:  Pt is fully awake    Circulation:  BP +/- 20% of pre-procedure level    Respiration:  Patient is able to breathe deeply    O2 Sat:  Patient is able to maintain O2 Sat >92% on room air    Activity:  Moves 4 extremities on command    Ambulation:  Patient is able to stand and walk or stand and pivot into wheelchair    Dressing:  Clean/dry or No Dressing    Notes:   Discharge instructions and AVS given to patient    Patient meets criteria for discharge?  YES    Admitted to PCU?  No    Responsible adult present to accompany patient home?  Yes    Signature/Title:    Elie Segovia RN Care Coordinator  [facility]

## 2018-12-03 NOTE — TELEPHONE ENCOUNTER
Reason for Call:  Form, our goal is to have forms completed with 72 hours, however, some forms may require a visit or additional information.    Type of letter, form or note:  medical    Who is the form from?: [facility] (if other please explain)    Where did the form come from: form was faxed in    What clinic location was the form placed at?: [facility]    Where the form was placed: Form's Box    What number is listed as a contact on the form?: [phone]       Additional comments:     Call taken on 12/3/2018 at 4:42 PM by Rossana Gutierrez

## 2018-12-04 NOTE — TELEPHONE ENCOUNTER
Patient had a attempted left L4 transforaminal epidural steroid injection , change to L5-S1 interlaminar epidural steroid injection on 11/26/18.  Called patient for an update.      Left message that we were calling for an update about how s/he was doing after the injection.  LM that if s/he has any problems or questions to call the clinic at [phone].

## 2018-12-07 NOTE — TELEPHONE ENCOUNTER
"Requested Prescriptions   Pending Prescriptions Disp Refills     amLODIPine (NORVASC) 5 MG tablet [Pharmacy Med Name: AMLODIPINE BESYLATE 5 MG TAB] 180 tablet 1     Sig: TAKE 1 TABLET BY MOUTH TWICE DAILY    Calcium Channel Blockers Protocol  Failed    12/7/2018  9:20 AM       Failed - Blood pressure under 140/90 in past 12 months    BP Readings from Last 3 Encounters:   11/26/18 153/81   08/16/18 138/74   03/26/18 109/69                Passed - Recent (12 mo) or future (30 days) visit within the authorizing provider's specialty    Patient had office visit in the last 12 months or has a visit in the next 30 days with authorizing provider or within the authorizing provider's specialty.  See \"Patient Info\" tab in inbasket, or \"Choose Columns\" in Meds & Orders section of the refill encounter.             Passed - Patient is age 18 or older       Passed - No active pregnancy on record       Passed - Normal serum creatinine on file in past 12 months    Recent Labs   Lab Test  08/16/18   0817   CR  1.02            Passed - No positive pregnancy test in past 12 months      Routing refill request to provider for review/approval because:  Labs out of range:  BP          "

## 2018-12-10 NOTE — PATIENT INSTRUCTIONS
Add Miralax daily to help soften stools    Push fluids    Can use Tucks or Preparation H on hemorid     Follow up if symptoms do not improve or worsen.

## 2018-12-10 NOTE — NURSING NOTE
"Chief Complaint   Patient presents with     Rectal Problem       Initial /72   Pulse 72   Temp 97.7  F (36.5  C) (Tympanic)   Resp 20   Ht 1.499 m (4' 11\")   Wt 72.6 kg (160 lb)   BMI 32.32 kg/m   Estimated body mass index is 32.32 kg/m  as calculated from the following:    Height as of this encounter: 1.499 m (4' 11\").    Weight as of this encounter: 72.6 kg (160 lb).    Patient presents to the clinic using [employer] Maintenance that is potentially due pending provider review:  NONE    n/a    Paola Hutchins, CMA        "

## 2018-12-10 NOTE — PROGRESS NOTES
SUBJECTIVE:   Lara Wills is a 89 year old female who presents to clinic today for the following health issues:      Rectal Problem       Duration: 2 days     Description (location/character/radiation): rectal bleeding with bowl movement     Intensity:  moderate    Accompanying signs and symptoms: none     History (similar episodes/previous evaluation): previous rectal bleeding with bowl movement     Precipitating or alleviating factors: None    Therapies tried and outcome: None     Past 2 days some bright red blood with wiping-none in the toilet or noted in the stool  Similar symptoms in the past  Lara declined straining or constipation  Taking colace 3 times per day and her daughter in law often has small hard stools-pebble like    Problem list and histories reviewed & adjusted, as indicated.  Additional history: as documented    Patient Active Problem List   Diagnosis     Advance Care Planning     Hyperlipidemia with target LDL less than 130     Chronic constipation     OA (osteoarthritis) of knee     Generalized anxiety disorder     Major depressive disorder, single episode, mild (H)     Benign essential hypertension     Hypothyroidism, unspecified type     Shoulder dislocation, left, initial encounter     Closed supracondylar fracture of left humerus     Fall     Delirium     Hypoxemia     Interstitial fibrosis     Health Care Home     Mild cognitive impairment     Undiagnosed cardiac murmurs     Past Surgical History:   Procedure Laterality Date     APPENDECTOMY  1947     BREAST BIOPSY, RT/LT      right - benign     CATARACT IOL, RT/LT      bilateral     partial thyroidectomy       VITRECTOMY,STRIP EPIRETINAL MEMBRANE  2002       Social History     Tobacco Use     Smoking status: Never Smoker     Smokeless tobacco: Never Used   Substance Use Topics     Alcohol use: No     Family History   Problem Relation Age of Onset     C.A.D. Mother      Cancer - colorectal Father      Alzheimer Disease Brother   "    Neurologic Disorder Brother         parkinson     Cancer Daughter         lymphoma         Current Outpatient Medications   Medication Sig Dispense Refill     amLODIPine (NORVASC) 5 MG tablet TAKE 1 TABLET BY MOUTH TWICE DAILY 180 tablet 1     brimonidine (ALPHAGAN) 0.2 % ophthalmic solution Place 1 drop Into the left eye 2 times daily       busPIRone (BUSPAR) 10 MG tablet Take 10 mg by mouth 2 times daily 0800, 1700       calcium-vitamin D (CALTRATE) 600-400 MG-UNIT per tablet Take 1 tablet by mouth 2 times daily At 1200, and 8 pm       docusate sodium (COLACE) 100 MG capsule Take 100 mg by mouth 3 times daily 0800, 1200, 2000       DULoxetine (CYMBALTA) 60 MG EC capsule Take 60 mg by mouth daily       latanoprost (XALATAN) 0.005 % ophthalmic solution Place 1 drop into both eyes At Bedtime 1 Bottle 4     levothyroxine (SYNTHROID/LEVOTHROID) 50 MCG tablet Take 1 tablet (50 mcg) by mouth daily 90 tablet 3     losartan (COZAAR) 25 MG tablet TAKE 1 TABLET (25 MG) BY MOUTH 2 TIMES DAILY 180 tablet 1     metoprolol succinate (TOPROL-XL) 25 MG 24 hr tablet Take 1 tablet (25 mg) by mouth daily 90 tablet 1     mirtazapine (REMERON) 15 MG tablet Take 15 mg by mouth At Bedtime       multivitamin  with lutein (OCUVITE WITH LTEIN) CAPS Take 1 capsule by mouth daily At 1200       order for DME Oxygen 2 Li/min  at night       VITAMIN D, CHOLECALCIFEROL, PO Take 2,000 Units by mouth daily At 1200       Allergies   Allergen Reactions     Codeine Sulfate Unknown     Penicillins Unknown     Labs reviewed in EPIC    Reviewed and updated as needed this visit by clinical staff       Reviewed and updated as needed this visit by Provider         ROS:  Constitutional, HEENT, cardiovascular, pulmonary, gi and gu systems are negative, except as otherwise noted.    OBJECTIVE:     /72   Pulse 72   Temp 97.7  F (36.5  C) (Tympanic)   Resp 20   Ht 1.499 m (4' 11\")   Wt 72.6 kg (160 lb)   BMI 32.32 kg/m    Body mass index is 32.32 " kg/m .  GENERAL: healthy, alert and no distress  RESP: lungs clear to auscultation - no rales, rhonchi or wheezes  CV: regular rates and rhythm, normal S1 S2, no S3 or S4 and grade 3/6 systolic murmur  ABDOMEN: soft, nontender, no hepatosplenomegaly, no masses and bowel sounds normal  RECTAL (female): small external hemorrhoid present  PSYCH: mentation appears normal, affect normal/bright    Diagnostic Test Results:  none     ASSESSMENT/PLAN:     1. External hemorrhoids  Hemorrhoid likely contributing to blood with wiping.  Treatment of constipation and hemorrhoid would like improve symptoms.  Follow up if no improvement of symptoms, may need to consider further evaluation at that time.    2. Chronic constipation  Per above.    Home care instructions were reviewed with the patient. The risks, benefits and treatment options of prescribed medications or other treatments have been discussed with the patient. The patient verbalized their understanding and should call or follow up if no improvement or if they develop further problems.    Patient Instructions   Add Miralax daily to help soften stools    Push fluids    Can use Tucks or Preparation H on hemorid     Follow up if symptoms do not improve or worsen.          ROSANGELA Pop [facility]

## 2018-12-21 NOTE — PATIENT INSTRUCTIONS
Thank you for choosing [facility].  You may be receiving a survey in the mail from [facility] regarding your visit today.  Please take a few minutes to complete and return the survey to let us know how we are doing.      Our Clinic hours are:  Mondays    7:20 am - 7 pm  Tues - Fri  7:20 am - 5 pm    Clinic Phone: [phone]    The clinic lab opens at 7:30 am Mon - Fri and appointments are required.    [facility]. [phone]  Monday  8 am - 7pm  Tues - Fri 8 am - 5:30 pm

## 2018-12-21 NOTE — PROGRESS NOTES
"  SUBJECTIVE:   Lara Wills is a 89 year old female who presents to clinic today for the following health issues:      Chief Complaint   Patient presents with     Cerumen Impaction     Patient is here to have her ears flushed for wax.        SUBJECTIVE:  Lara Wills, a 89 year old female scheduled an appointment to discuss the following issues:     Impacted cerumen of left ear  Benign essential hypertension  Bilateral sensorineural hearing loss     Hearing loss, wants ears flushed.   When we found only impacted wax in the left ear, she was unhappy that this wasn't the cause of her hearing loss but denied referral to audiology.     Medical, social, surgical, and family histories reviewed.    ROS:  5 point ROS negative except as noted above in HPI, including Gen., Resp., CV, GI &  system review.    OBJECTIVE:  /70   Pulse 80   Temp 97.4  F (36.3  C) (Tympanic)   Resp 18   Ht 1.499 m (4' 11\")   SpO2 93%   BMI 32.32 kg/m    EXAM:  GENERAL APPEARANCE ADULT: Alert, no acute distress  HENT: right TM normal, left TM normal, not visualized secondary to cerumen.   Cerumen removed by flushing.  Tympanic membrane re-examined after flushing and found to be normal    ASSESSMENT/PLAN:    (H61.22) Impacted cerumen of left ear  (primary encounter diagnosis)  Comment:    Plan:  Flushed in clinic    (I10) Benign essential hypertension  Comment:    Plan: losartan (COZAAR) 25 MG tablet             (H90.3) Bilateral sensorineural hearing loss  Comment:    Plan:      Alejandra Watson M.D.        Patient Instructions         Thank you for choosing [facility].  You may be receiving a survey in the mail from [facility] regarding your visit today.  Please take a few minutes to complete and return the survey to let us know how we are doing.      Our Clinic hours are:  Mondays    7:20 am - 7 pm  Tues -  Fri  7:20 am - 5 pm    Clinic Phone: [phone]    The clinic lab opens at 7:30 am Mon - Fri and appointments " are required.    [facility]  Ph. [phone]  Monday  8 am - 7pm  Tues - Fri 8 am - 5:30 pm

## 2019-01-01 ENCOUNTER — APPOINTMENT (OUTPATIENT)
Dept: CT IMAGING | Facility: CLINIC | Age: 84
DRG: 375 | End: 2019-01-01
Payer: COMMERCIAL

## 2019-01-01 ENCOUNTER — NURSING HOME VISIT (OUTPATIENT)
Dept: GERIATRICS | Facility: CLINIC | Age: 84
End: 2019-01-01
Payer: COMMERCIAL

## 2019-01-01 ENCOUNTER — PATIENT OUTREACH (OUTPATIENT)
Dept: CARE COORDINATION | Facility: CLINIC | Age: 84
End: 2019-01-01

## 2019-01-01 ENCOUNTER — APPOINTMENT (OUTPATIENT)
Dept: GENERAL RADIOLOGY | Facility: CLINIC | Age: 84
DRG: 375 | End: 2019-01-01
Payer: COMMERCIAL

## 2019-01-01 ENCOUNTER — HOSPITAL ENCOUNTER (OUTPATIENT)
Facility: CLINIC | Age: 84
Setting detail: OBSERVATION
Discharge: NURSING, REHAB OR LONG-TERM CARE | DRG: 375 | End: 2019-01-18
Attending: FAMILY MEDICINE | Admitting: FAMILY MEDICINE
Payer: COMMERCIAL

## 2019-01-01 ENCOUNTER — APPOINTMENT (OUTPATIENT)
Dept: PHYSICAL THERAPY | Facility: CLINIC | Age: 84
DRG: 375 | End: 2019-01-01
Payer: COMMERCIAL

## 2019-01-01 ENCOUNTER — DISCHARGE SUMMARY NURSING HOME (OUTPATIENT)
Dept: GERIATRICS | Facility: CLINIC | Age: 84
End: 2019-01-01
Payer: COMMERCIAL

## 2019-01-01 ENCOUNTER — APPOINTMENT (OUTPATIENT)
Dept: OCCUPATIONAL THERAPY | Facility: CLINIC | Age: 84
DRG: 375 | End: 2019-01-01
Payer: COMMERCIAL

## 2019-01-01 ENCOUNTER — TELEPHONE (OUTPATIENT)
Dept: FAMILY MEDICINE | Facility: CLINIC | Age: 84
End: 2019-01-01

## 2019-01-01 ENCOUNTER — HOSPITAL LABORATORY (OUTPATIENT)
Facility: OTHER | Age: 84
End: 2019-01-01

## 2019-01-01 VITALS
HEART RATE: 101 BPM | TEMPERATURE: 97.4 F | SYSTOLIC BLOOD PRESSURE: 117 MMHG | OXYGEN SATURATION: 91 % | DIASTOLIC BLOOD PRESSURE: 67 MMHG | RESPIRATION RATE: 17 BRPM | WEIGHT: 152 LBS | BODY MASS INDEX: 29.69 KG/M2

## 2019-01-01 VITALS
SYSTOLIC BLOOD PRESSURE: 129 MMHG | RESPIRATION RATE: 17 BRPM | HEART RATE: 80 BPM | TEMPERATURE: 97.6 F | BODY MASS INDEX: 30.47 KG/M2 | DIASTOLIC BLOOD PRESSURE: 69 MMHG | OXYGEN SATURATION: 92 % | WEIGHT: 156 LBS

## 2019-01-01 VITALS
DIASTOLIC BLOOD PRESSURE: 72 MMHG | SYSTOLIC BLOOD PRESSURE: 125 MMHG | WEIGHT: 153 LBS | OXYGEN SATURATION: 92 % | HEART RATE: 89 BPM | BODY MASS INDEX: 29.88 KG/M2 | RESPIRATION RATE: 18 BRPM | TEMPERATURE: 98.4 F

## 2019-01-01 VITALS
WEIGHT: 153 LBS | HEART RATE: 89 BPM | OXYGEN SATURATION: 96 % | RESPIRATION RATE: 17 BRPM | SYSTOLIC BLOOD PRESSURE: 115 MMHG | BODY MASS INDEX: 29.88 KG/M2 | DIASTOLIC BLOOD PRESSURE: 65 MMHG | TEMPERATURE: 96 F

## 2019-01-01 VITALS
HEIGHT: 60 IN | HEART RATE: 102 BPM | TEMPERATURE: 98.6 F | BODY MASS INDEX: 31.29 KG/M2 | OXYGEN SATURATION: 89 % | RESPIRATION RATE: 16 BRPM | DIASTOLIC BLOOD PRESSURE: 76 MMHG | WEIGHT: 159.39 LBS | SYSTOLIC BLOOD PRESSURE: 149 MMHG

## 2019-01-01 VITALS
RESPIRATION RATE: 20 BRPM | WEIGHT: 156 LBS | OXYGEN SATURATION: 94 % | BODY MASS INDEX: 30.47 KG/M2 | DIASTOLIC BLOOD PRESSURE: 83 MMHG | SYSTOLIC BLOOD PRESSURE: 149 MMHG | TEMPERATURE: 97.9 F | HEART RATE: 90 BPM

## 2019-01-01 VITALS
TEMPERATURE: 97.8 F | WEIGHT: 159 LBS | HEART RATE: 89 BPM | DIASTOLIC BLOOD PRESSURE: 73 MMHG | OXYGEN SATURATION: 93 % | BODY MASS INDEX: 31.05 KG/M2 | SYSTOLIC BLOOD PRESSURE: 124 MMHG | RESPIRATION RATE: 16 BRPM

## 2019-01-01 DIAGNOSIS — F41.1 GENERALIZED ANXIETY DISORDER: ICD-10-CM

## 2019-01-01 DIAGNOSIS — R10.84 ABDOMINAL PAIN, GENERALIZED: ICD-10-CM

## 2019-01-01 DIAGNOSIS — C18.4 MALIGNANT NEOPLASM OF TRANSVERSE COLON (H): Primary | ICD-10-CM

## 2019-01-01 DIAGNOSIS — G89.29 CHRONIC BACK PAIN, UNSPECIFIED BACK LOCATION, UNSPECIFIED BACK PAIN LATERALITY: ICD-10-CM

## 2019-01-01 DIAGNOSIS — K44.9 HIATAL HERNIA: ICD-10-CM

## 2019-01-01 DIAGNOSIS — F32.0 MAJOR DEPRESSIVE DISORDER, SINGLE EPISODE, MILD (H): ICD-10-CM

## 2019-01-01 DIAGNOSIS — K59.09 CHRONIC CONSTIPATION: ICD-10-CM

## 2019-01-01 DIAGNOSIS — K63.9 LESION OF COLON: ICD-10-CM

## 2019-01-01 DIAGNOSIS — G47.00 INSOMNIA, UNSPECIFIED TYPE: ICD-10-CM

## 2019-01-01 DIAGNOSIS — I10 BENIGN ESSENTIAL HYPERTENSION: ICD-10-CM

## 2019-01-01 DIAGNOSIS — M62.81 GENERALIZED MUSCLE WEAKNESS: ICD-10-CM

## 2019-01-01 DIAGNOSIS — R62.7 ADULT FAILURE TO THRIVE: ICD-10-CM

## 2019-01-01 DIAGNOSIS — G31.84 MILD COGNITIVE IMPAIRMENT: ICD-10-CM

## 2019-01-01 DIAGNOSIS — M54.9 CHRONIC BACK PAIN, UNSPECIFIED BACK LOCATION, UNSPECIFIED BACK PAIN LATERALITY: ICD-10-CM

## 2019-01-01 DIAGNOSIS — R41.0 DELIRIUM: ICD-10-CM

## 2019-01-01 DIAGNOSIS — R09.02 HYPOXEMIA: ICD-10-CM

## 2019-01-01 DIAGNOSIS — J84.10 PULMONARY FIBROSIS (H): ICD-10-CM

## 2019-01-01 DIAGNOSIS — M17.9 OSTEOARTHRITIS OF KNEE, UNSPECIFIED LATERALITY, UNSPECIFIED OSTEOARTHRITIS TYPE: ICD-10-CM

## 2019-01-01 DIAGNOSIS — E03.9 HYPOTHYROIDISM, UNSPECIFIED TYPE: ICD-10-CM

## 2019-01-01 DIAGNOSIS — K63.9 DISEASE OF INTESTINE: ICD-10-CM

## 2019-01-01 DIAGNOSIS — R41.0 ACUTE NON-PSYCHOTIC BRAIN SYNDROME: ICD-10-CM

## 2019-01-01 DIAGNOSIS — N39.0 URINARY TRACT INFECTION WITHOUT HEMATURIA, SITE UNSPECIFIED: ICD-10-CM

## 2019-01-01 DIAGNOSIS — G31.84 MILD COGNITIVE IMPAIRMENT, SO STATED: ICD-10-CM

## 2019-01-01 LAB
ALBUMIN SERPL-MCNC: 2.7 G/DL (ref 3.4–5)
ALBUMIN UR-MCNC: NEGATIVE MG/DL
ALP SERPL-CCNC: 79 U/L (ref 40–150)
ALT SERPL W P-5'-P-CCNC: 9 U/L (ref 0–50)
AMORPH CRY #/AREA URNS HPF: ABNORMAL /HPF
ANION GAP SERPL CALCULATED.3IONS-SCNC: 6 MMOL/L (ref 3–14)
ANION GAP SERPL CALCULATED.3IONS-SCNC: 7 MMOL/L (ref 3–14)
ANION GAP SERPL CALCULATED.3IONS-SCNC: 8 MMOL/L (ref 3–14)
APPEARANCE UR: ABNORMAL
AST SERPL W P-5'-P-CCNC: 14 U/L (ref 0–45)
BACTERIA #/AREA URNS HPF: ABNORMAL /HPF
BACTERIA SPEC CULT: NORMAL
BACTERIA SPEC CULT: NORMAL
BASOPHILS # BLD AUTO: 0.1 10E9/L (ref 0–0.2)
BASOPHILS NFR BLD AUTO: 0.7 %
BILIRUB SERPL-MCNC: 0.4 MG/DL (ref 0.2–1.3)
BILIRUB UR QL STRIP: NEGATIVE
BUN SERPL-MCNC: 11 MG/DL (ref 7–30)
BUN SERPL-MCNC: 12 MG/DL (ref 7–30)
BUN SERPL-MCNC: 15 MG/DL (ref 7–30)
BUN SERPL-MCNC: 17 MG/DL (ref 7–30)
BUN SERPL-MCNC: 9 MG/DL (ref 7–30)
CALCIUM SERPL-MCNC: 7.7 MG/DL (ref 8.5–10.1)
CALCIUM SERPL-MCNC: 7.8 MG/DL (ref 8.5–10.1)
CALCIUM SERPL-MCNC: 8.3 MG/DL (ref 8.5–10.1)
CALCIUM SERPL-MCNC: 8.6 MG/DL (ref 8.5–10.1)
CALCIUM SERPL-MCNC: 8.7 MG/DL (ref 8.5–10.1)
CHLORIDE SERPL-SCNC: 100 MMOL/L (ref 94–109)
CHLORIDE SERPL-SCNC: 102 MMOL/L (ref 94–109)
CHLORIDE SERPL-SCNC: 104 MMOL/L (ref 94–109)
CHLORIDE SERPL-SCNC: 107 MMOL/L (ref 94–109)
CHLORIDE SERPL-SCNC: 99 MMOL/L (ref 94–109)
CO2 SERPL-SCNC: 27 MMOL/L (ref 20–32)
CO2 SERPL-SCNC: 30 MMOL/L (ref 20–32)
COLOR UR AUTO: YELLOW
CREAT SERPL-MCNC: 0.58 MG/DL (ref 0.52–1.04)
CREAT SERPL-MCNC: 0.61 MG/DL (ref 0.52–1.04)
CREAT SERPL-MCNC: 0.62 MG/DL (ref 0.52–1.04)
CREAT SERPL-MCNC: 0.63 MG/DL (ref 0.52–1.04)
CREAT SERPL-MCNC: 0.77 MG/DL (ref 0.52–1.04)
DIFFERENTIAL METHOD BLD: ABNORMAL
EOSINOPHIL # BLD AUTO: 0.1 10E9/L (ref 0–0.7)
EOSINOPHIL NFR BLD AUTO: 0.6 %
ERYTHROCYTE [DISTWIDTH] IN BLOOD BY AUTOMATED COUNT: 12.3 % (ref 10–15)
ERYTHROCYTE [DISTWIDTH] IN BLOOD BY AUTOMATED COUNT: 12.4 % (ref 10–15)
GFR SERPL CREATININE-BSD FRML MDRD: 65 ML/MIN/{1.73_M2}
GFR SERPL CREATININE-BSD FRML MDRD: 75 ML/MIN/{1.73_M2}
GFR SERPL CREATININE-BSD FRML MDRD: 76 ML/MIN/{1.73_M2}
GFR SERPL CREATININE-BSD FRML MDRD: 76 ML/MIN/{1.73_M2}
GFR SERPL CREATININE-BSD FRML MDRD: 77 ML/MIN/{1.73_M2}
GLUCOSE SERPL-MCNC: 108 MG/DL (ref 70–99)
GLUCOSE SERPL-MCNC: 111 MG/DL (ref 70–99)
GLUCOSE SERPL-MCNC: 93 MG/DL (ref 70–99)
GLUCOSE SERPL-MCNC: 93 MG/DL (ref 70–99)
GLUCOSE SERPL-MCNC: 98 MG/DL (ref 70–99)
GLUCOSE UR STRIP-MCNC: NEGATIVE MG/DL
HCT VFR BLD AUTO: 33.8 % (ref 35–47)
HCT VFR BLD AUTO: 37.7 % (ref 35–47)
HCT VFR BLD AUTO: 38 % (ref 35–47)
HCT VFR BLD AUTO: 38.9 % (ref 35–47)
HGB BLD-MCNC: 11.3 G/DL (ref 11.7–15.7)
HGB BLD-MCNC: 11.9 G/DL (ref 11.7–15.7)
HGB BLD-MCNC: 12 G/DL (ref 11.7–15.7)
HGB BLD-MCNC: 12.1 G/DL (ref 11.7–15.7)
HGB BLD-MCNC: 12.4 G/DL (ref 11.7–15.7)
HGB UR QL STRIP: NEGATIVE
HYALINE CASTS #/AREA URNS LPF: 4 /LPF (ref 0–2)
IMM GRANULOCYTES # BLD: 0.1 10E9/L (ref 0–0.4)
IMM GRANULOCYTES NFR BLD: 0.5 %
KETONES UR STRIP-MCNC: NEGATIVE MG/DL
LEUKOCYTE ESTERASE UR QL STRIP: ABNORMAL
LIPASE SERPL-CCNC: 129 U/L (ref 73–393)
LYMPHOCYTES # BLD AUTO: 1.1 10E9/L (ref 0.8–5.3)
LYMPHOCYTES NFR BLD AUTO: 8.8 %
Lab: NORMAL
MCH RBC QN AUTO: 28.9 PG (ref 26.5–33)
MCH RBC QN AUTO: 29.1 PG (ref 26.5–33)
MCH RBC QN AUTO: 29.2 PG (ref 26.5–33)
MCH RBC QN AUTO: 29.4 PG (ref 26.5–33)
MCHC RBC AUTO-ENTMCNC: 31.1 G/DL (ref 31.5–36.5)
MCHC RBC AUTO-ENTMCNC: 31.8 G/DL (ref 31.5–36.5)
MCHC RBC AUTO-ENTMCNC: 32.6 G/DL (ref 31.5–36.5)
MCHC RBC AUTO-ENTMCNC: 33.4 G/DL (ref 31.5–36.5)
MCV RBC AUTO: 88 FL (ref 78–100)
MCV RBC AUTO: 90 FL (ref 78–100)
MCV RBC AUTO: 91 FL (ref 78–100)
MCV RBC AUTO: 93 FL (ref 78–100)
MONOCYTES # BLD AUTO: 1.2 10E9/L (ref 0–1.3)
MONOCYTES NFR BLD AUTO: 9.8 %
MUCOUS THREADS #/AREA URNS LPF: PRESENT /LPF
NEUTROPHILS # BLD AUTO: 10 10E9/L (ref 1.6–8.3)
NEUTROPHILS NFR BLD AUTO: 79.6 %
NITRATE UR QL: NEGATIVE
NRBC # BLD AUTO: 0 10*3/UL
NRBC BLD AUTO-RTO: 0 /100
PH UR STRIP: 7 PH (ref 5–7)
PLATELET # BLD AUTO: 460 10E9/L (ref 150–450)
PLATELET # BLD AUTO: 500 10E9/L (ref 150–450)
PLATELET # BLD AUTO: 502 10E9/L (ref 150–450)
PLATELET # BLD AUTO: 577 10E9/L (ref 150–450)
POTASSIUM SERPL-SCNC: 3.6 MMOL/L (ref 3.4–5.3)
POTASSIUM SERPL-SCNC: 3.8 MMOL/L (ref 3.4–5.3)
PROT SERPL-MCNC: 6.5 G/DL (ref 6.8–8.8)
RBC # BLD AUTO: 3.85 10E12/L (ref 3.8–5.2)
RBC # BLD AUTO: 4.13 10E12/L (ref 3.8–5.2)
RBC # BLD AUTO: 4.19 10E12/L (ref 3.8–5.2)
RBC # BLD AUTO: 4.24 10E12/L (ref 3.8–5.2)
RBC #/AREA URNS AUTO: 1 /HPF (ref 0–2)
SODIUM SERPL-SCNC: 134 MMOL/L (ref 133–144)
SODIUM SERPL-SCNC: 135 MMOL/L (ref 133–144)
SODIUM SERPL-SCNC: 136 MMOL/L (ref 133–144)
SODIUM SERPL-SCNC: 139 MMOL/L (ref 133–144)
SODIUM SERPL-SCNC: 141 MMOL/L (ref 133–144)
SOURCE: ABNORMAL
SP GR UR STRIP: 1.01 (ref 1–1.03)
SPECIMEN SOURCE: NORMAL
SQUAMOUS #/AREA URNS AUTO: 2 /HPF (ref 0–1)
TSH SERPL DL<=0.005 MIU/L-ACNC: 3.06 MU/L (ref 0.4–4)
UROBILINOGEN UR STRIP-MCNC: 0 MG/DL (ref 0–2)
WBC # BLD AUTO: 10.7 10E9/L (ref 4–11)
WBC # BLD AUTO: 10.9 10E9/L (ref 4–11)
WBC # BLD AUTO: 12 10E9/L (ref 4–11)
WBC # BLD AUTO: 12.5 10E9/L (ref 4–11)
WBC #/AREA URNS AUTO: 28 /HPF (ref 0–5)

## 2019-01-01 PROCEDURE — 36415 COLL VENOUS BLD VENIPUNCTURE: CPT | Performed by: FAMILY MEDICINE

## 2019-01-01 PROCEDURE — 99309 SBSQ NF CARE MODERATE MDM 30: CPT | Performed by: NURSE PRACTITIONER

## 2019-01-01 PROCEDURE — 99316 NF DSCHRG MGMT 30 MIN+: CPT | Performed by: NURSE PRACTITIONER

## 2019-01-01 PROCEDURE — 12000000 ZZH R&B MED SURG/OB

## 2019-01-01 PROCEDURE — 25800030 ZZH RX IP 258 OP 636: Performed by: FAMILY MEDICINE

## 2019-01-01 PROCEDURE — G0378 HOSPITAL OBSERVATION PER HR: HCPCS

## 2019-01-01 PROCEDURE — 97116 GAIT TRAINING THERAPY: CPT | Mod: GP

## 2019-01-01 PROCEDURE — 97161 PT EVAL LOW COMPLEX 20 MIN: CPT | Mod: GP

## 2019-01-01 PROCEDURE — 25000132 ZZH RX MED GY IP 250 OP 250 PS 637: Performed by: NURSE PRACTITIONER

## 2019-01-01 PROCEDURE — 25000128 H RX IP 250 OP 636: Performed by: PHYSICIAN ASSISTANT

## 2019-01-01 PROCEDURE — 25000132 ZZH RX MED GY IP 250 OP 250 PS 637: Performed by: FAMILY MEDICINE

## 2019-01-01 PROCEDURE — 99285 EMERGENCY DEPT VISIT HI MDM: CPT | Mod: 25 | Performed by: FAMILY MEDICINE

## 2019-01-01 PROCEDURE — 97165 OT EVAL LOW COMPLEX 30 MIN: CPT | Mod: GO

## 2019-01-01 PROCEDURE — 25000128 H RX IP 250 OP 636: Performed by: FAMILY MEDICINE

## 2019-01-01 PROCEDURE — 99207 ZZC CDG-CODE CATEGORY CHANGED: CPT | Performed by: FAMILY MEDICINE

## 2019-01-01 PROCEDURE — 99358 PROLONG SERVICE W/O CONTACT: CPT | Performed by: NURSE PRACTITIONER

## 2019-01-01 PROCEDURE — 85025 COMPLETE CBC W/AUTO DIFF WBC: CPT | Performed by: FAMILY MEDICINE

## 2019-01-01 PROCEDURE — 84443 ASSAY THYROID STIM HORMONE: CPT | Performed by: FAMILY MEDICINE

## 2019-01-01 PROCEDURE — 96365 THER/PROPH/DIAG IV INF INIT: CPT | Performed by: FAMILY MEDICINE

## 2019-01-01 PROCEDURE — 97535 SELF CARE MNGMENT TRAINING: CPT | Mod: GO

## 2019-01-01 PROCEDURE — 99233 SBSQ HOSP IP/OBS HIGH 50: CPT | Performed by: FAMILY MEDICINE

## 2019-01-01 PROCEDURE — 97110 THERAPEUTIC EXERCISES: CPT | Mod: GP

## 2019-01-01 PROCEDURE — 85027 COMPLETE CBC AUTOMATED: CPT | Performed by: FAMILY MEDICINE

## 2019-01-01 PROCEDURE — 81001 URINALYSIS AUTO W/SCOPE: CPT | Performed by: FAMILY MEDICINE

## 2019-01-01 PROCEDURE — 83690 ASSAY OF LIPASE: CPT | Performed by: FAMILY MEDICINE

## 2019-01-01 PROCEDURE — 99239 HOSP IP/OBS DSCHRG MGMT >30: CPT | Performed by: FAMILY MEDICINE

## 2019-01-01 PROCEDURE — 99222 1ST HOSP IP/OBS MODERATE 55: CPT | Performed by: NURSE PRACTITIONER

## 2019-01-01 PROCEDURE — 93010 ELECTROCARDIOGRAM REPORT: CPT | Mod: Z6 | Performed by: FAMILY MEDICINE

## 2019-01-01 PROCEDURE — 80048 BASIC METABOLIC PNL TOTAL CA: CPT | Performed by: FAMILY MEDICINE

## 2019-01-01 PROCEDURE — 96376 TX/PRO/DX INJ SAME DRUG ADON: CPT

## 2019-01-01 PROCEDURE — 99310 SBSQ NF CARE HIGH MDM 45: CPT | Performed by: NURSE PRACTITIONER

## 2019-01-01 PROCEDURE — 40000133 ZZH STATISTIC OT WARD VISIT

## 2019-01-01 PROCEDURE — 99222 1ST HOSP IP/OBS MODERATE 55: CPT | Mod: AI | Performed by: FAMILY MEDICINE

## 2019-01-01 PROCEDURE — 74176 CT ABD & PELVIS W/O CONTRAST: CPT

## 2019-01-01 PROCEDURE — 87086 URINE CULTURE/COLONY COUNT: CPT | Performed by: FAMILY MEDICINE

## 2019-01-01 PROCEDURE — 99359 PROLONG SERV W/O CONTACT ADD: CPT | Performed by: NURSE PRACTITIONER

## 2019-01-01 PROCEDURE — 70450 CT HEAD/BRAIN W/O DYE: CPT

## 2019-01-01 PROCEDURE — 71046 X-RAY EXAM CHEST 2 VIEWS: CPT

## 2019-01-01 PROCEDURE — 25000125 ZZHC RX 250: Performed by: FAMILY MEDICINE

## 2019-01-01 PROCEDURE — 93005 ELECTROCARDIOGRAM TRACING: CPT | Performed by: FAMILY MEDICINE

## 2019-01-01 PROCEDURE — 40000193 ZZH STATISTIC PT WARD VISIT

## 2019-01-01 PROCEDURE — 99232 SBSQ HOSP IP/OBS MODERATE 35: CPT | Performed by: FAMILY MEDICINE

## 2019-01-01 PROCEDURE — 80053 COMPREHEN METABOLIC PANEL: CPT | Performed by: FAMILY MEDICINE

## 2019-01-01 RX ORDER — HYDROMORPHONE HCL/0.9% NACL/PF 0.2MG/0.2
0.2 SYRINGE (ML) INTRAVENOUS
Status: DISCONTINUED | OUTPATIENT
Start: 2019-01-01 | End: 2019-01-01 | Stop reason: HOSPADM

## 2019-01-01 RX ORDER — BRIMONIDINE TARTRATE 2 MG/ML
1 SOLUTION/ DROPS OPHTHALMIC 2 TIMES DAILY
Status: DISCONTINUED | OUTPATIENT
Start: 2019-01-01 | End: 2019-01-01 | Stop reason: HOSPADM

## 2019-01-01 RX ORDER — SODIUM CHLORIDE 9 MG/ML
INJECTION, SOLUTION INTRAVENOUS CONTINUOUS
Status: DISCONTINUED | OUTPATIENT
Start: 2019-01-01 | End: 2019-01-01 | Stop reason: HOSPADM

## 2019-01-01 RX ORDER — LOSARTAN POTASSIUM 25 MG/1
25 TABLET ORAL 2 TIMES DAILY
Status: DISCONTINUED | OUTPATIENT
Start: 2019-01-01 | End: 2019-01-01 | Stop reason: HOSPADM

## 2019-01-01 RX ORDER — PROCHLORPERAZINE MALEATE 5 MG
5 TABLET ORAL EVERY 6 HOURS PRN
Status: DISCONTINUED | OUTPATIENT
Start: 2019-01-01 | End: 2019-01-01 | Stop reason: HOSPADM

## 2019-01-01 RX ORDER — ACETAMINOPHEN 500 MG
1000 TABLET ORAL EVERY 6 HOURS
COMMUNITY

## 2019-01-01 RX ORDER — HYDROCODONE BITARTRATE AND ACETAMINOPHEN 5; 325 MG/1; MG/1
1-2 TABLET ORAL EVERY 6 HOURS PRN
Qty: 14 TABLET | Refills: 0 | Status: SHIPPED | OUTPATIENT
Start: 2019-01-01 | End: 2019-01-01

## 2019-01-01 RX ORDER — ONDANSETRON 4 MG/1
4 TABLET, FILM COATED ORAL EVERY MORNING
COMMUNITY

## 2019-01-01 RX ORDER — I-VITE, TAB 1000-60-2MG (60/BT) 300MCG-200
1 TAB ORAL DAILY
Status: DISCONTINUED | OUTPATIENT
Start: 2019-01-01 | End: 2019-01-01 | Stop reason: HOSPADM

## 2019-01-01 RX ORDER — ONDANSETRON 4 MG/1
4 TABLET, ORALLY DISINTEGRATING ORAL EVERY 6 HOURS PRN
Status: DISCONTINUED | OUTPATIENT
Start: 2019-01-01 | End: 2019-01-01

## 2019-01-01 RX ORDER — BISACODYL 10 MG
10 SUPPOSITORY, RECTAL RECTAL ONCE
Status: COMPLETED | OUTPATIENT
Start: 2019-01-01 | End: 2019-01-01

## 2019-01-01 RX ORDER — LEVOTHYROXINE SODIUM 50 UG/1
50 TABLET ORAL
Status: DISCONTINUED | OUTPATIENT
Start: 2019-01-01 | End: 2019-01-01 | Stop reason: HOSPADM

## 2019-01-01 RX ORDER — PROCHLORPERAZINE 25 MG
12.5 SUPPOSITORY, RECTAL RECTAL EVERY 12 HOURS PRN
Status: DISCONTINUED | OUTPATIENT
Start: 2019-01-01 | End: 2019-01-01 | Stop reason: HOSPADM

## 2019-01-01 RX ORDER — DOCUSATE SODIUM 100 MG/1
100 CAPSULE, LIQUID FILLED ORAL 3 TIMES DAILY
Status: DISCONTINUED | OUTPATIENT
Start: 2019-01-01 | End: 2019-01-01

## 2019-01-01 RX ORDER — ONDANSETRON 2 MG/ML
4 INJECTION INTRAMUSCULAR; INTRAVENOUS EVERY 6 HOURS PRN
Status: DISCONTINUED | OUTPATIENT
Start: 2019-01-01 | End: 2019-01-01 | Stop reason: HOSPADM

## 2019-01-01 RX ORDER — SENNOSIDES 8.6 MG
8.6 TABLET ORAL 2 TIMES DAILY PRN
Status: DISCONTINUED | OUTPATIENT
Start: 2019-01-01 | End: 2019-01-01 | Stop reason: HOSPADM

## 2019-01-01 RX ORDER — BISACODYL 10 MG
10 SUPPOSITORY, RECTAL RECTAL DAILY PRN
Status: DISCONTINUED | OUTPATIENT
Start: 2019-01-01 | End: 2019-01-01 | Stop reason: HOSPADM

## 2019-01-01 RX ORDER — NALOXONE HYDROCHLORIDE 0.4 MG/ML
.1-.4 INJECTION, SOLUTION INTRAMUSCULAR; INTRAVENOUS; SUBCUTANEOUS
Status: DISCONTINUED | OUTPATIENT
Start: 2019-01-01 | End: 2019-01-01 | Stop reason: HOSPADM

## 2019-01-01 RX ORDER — ACETAMINOPHEN 500 MG
1000 TABLET ORAL EVERY 8 HOURS PRN
Status: DISCONTINUED | OUTPATIENT
Start: 2019-01-01 | End: 2019-01-01

## 2019-01-01 RX ORDER — SORBITOL SOLUTION 70 %
30 SOLUTION, ORAL MISCELLANEOUS DAILY PRN
Status: DISCONTINUED | OUTPATIENT
Start: 2019-01-01 | End: 2019-01-01 | Stop reason: HOSPADM

## 2019-01-01 RX ORDER — METOPROLOL SUCCINATE 25 MG/1
25 TABLET, EXTENDED RELEASE ORAL DAILY
Status: DISCONTINUED | OUTPATIENT
Start: 2019-01-01 | End: 2019-01-01 | Stop reason: HOSPADM

## 2019-01-01 RX ORDER — BUSPIRONE HYDROCHLORIDE 10 MG/1
10 TABLET ORAL 2 TIMES DAILY
Status: DISCONTINUED | OUTPATIENT
Start: 2019-01-01 | End: 2019-01-01 | Stop reason: HOSPADM

## 2019-01-01 RX ORDER — ONDANSETRON 2 MG/ML
4 INJECTION INTRAMUSCULAR; INTRAVENOUS EVERY 6 HOURS PRN
Status: DISCONTINUED | OUTPATIENT
Start: 2019-01-01 | End: 2019-01-01

## 2019-01-01 RX ORDER — BRIMONIDINE TARTRATE 2 MG/ML
1 SOLUTION/ DROPS OPHTHALMIC 2 TIMES DAILY
Status: DISCONTINUED | OUTPATIENT
Start: 2019-01-01 | End: 2019-01-01

## 2019-01-01 RX ORDER — ONDANSETRON 4 MG/1
4 TABLET, ORALLY DISINTEGRATING ORAL EVERY 6 HOURS PRN
Status: DISCONTINUED | OUTPATIENT
Start: 2019-01-01 | End: 2019-01-01 | Stop reason: HOSPADM

## 2019-01-01 RX ORDER — BISACODYL 5 MG
5-10 TABLET, DELAYED RELEASE (ENTERIC COATED) ORAL DAILY
Status: DISCONTINUED | OUTPATIENT
Start: 2019-01-01 | End: 2019-01-01 | Stop reason: HOSPADM

## 2019-01-01 RX ORDER — NALOXONE HYDROCHLORIDE 0.4 MG/ML
.1-.4 INJECTION, SOLUTION INTRAMUSCULAR; INTRAVENOUS; SUBCUTANEOUS
Status: DISCONTINUED | OUTPATIENT
Start: 2019-01-01 | End: 2019-01-01

## 2019-01-01 RX ORDER — SENNOSIDES 8.6 MG
2 TABLET ORAL 2 TIMES DAILY
Qty: 60 TABLET | Refills: 0 | COMMUNITY
Start: 2019-01-01

## 2019-01-01 RX ORDER — BUSPIRONE HYDROCHLORIDE 5 MG/1
5 TABLET ORAL DAILY
COMMUNITY
Start: 2019-01-01 | End: 2019-01-01

## 2019-01-01 RX ORDER — AMLODIPINE BESYLATE 5 MG/1
5 TABLET ORAL 2 TIMES DAILY
Status: DISCONTINUED | OUTPATIENT
Start: 2019-01-01 | End: 2019-01-01 | Stop reason: HOSPADM

## 2019-01-01 RX ORDER — POLYETHYLENE GLYCOL 3350 17 G/17G
1 POWDER, FOR SOLUTION ORAL DAILY
COMMUNITY

## 2019-01-01 RX ORDER — POLYETHYLENE GLYCOL 3350 17 G/17G
17 POWDER, FOR SOLUTION ORAL DAILY
Status: DISCONTINUED | OUTPATIENT
Start: 2019-01-01 | End: 2019-01-01 | Stop reason: HOSPADM

## 2019-01-01 RX ORDER — ACETAMINOPHEN 325 MG/1
650 TABLET ORAL EVERY 4 HOURS PRN
Status: DISCONTINUED | OUTPATIENT
Start: 2019-01-01 | End: 2019-01-01 | Stop reason: HOSPADM

## 2019-01-01 RX ORDER — CEFTRIAXONE SODIUM 1 G/50ML
1 INJECTION, SOLUTION INTRAVENOUS EVERY 24 HOURS
Status: DISCONTINUED | OUTPATIENT
Start: 2019-01-01 | End: 2019-01-01

## 2019-01-01 RX ORDER — HYDROCODONE BITARTRATE AND ACETAMINOPHEN 5; 325 MG/1; MG/1
1-2 TABLET ORAL EVERY 4 HOURS PRN
Status: DISCONTINUED | OUTPATIENT
Start: 2019-01-01 | End: 2019-01-01 | Stop reason: HOSPADM

## 2019-01-01 RX ORDER — DULOXETIN HYDROCHLORIDE 30 MG/1
60 CAPSULE, DELAYED RELEASE ORAL DAILY
Status: DISCONTINUED | OUTPATIENT
Start: 2019-01-01 | End: 2019-01-01 | Stop reason: HOSPADM

## 2019-01-01 RX ORDER — MIRTAZAPINE 7.5 MG/1
7.5 TABLET, FILM COATED ORAL AT BEDTIME
COMMUNITY

## 2019-01-01 RX ORDER — HYDROCODONE BITARTRATE AND ACETAMINOPHEN 5; 325 MG/1; MG/1
1 TABLET ORAL EVERY 4 HOURS PRN
COMMUNITY
End: 2019-01-01

## 2019-01-01 RX ORDER — LATANOPROST 50 UG/ML
1 SOLUTION/ DROPS OPHTHALMIC AT BEDTIME
Status: DISCONTINUED | OUTPATIENT
Start: 2019-01-01 | End: 2019-01-01 | Stop reason: HOSPADM

## 2019-01-01 RX ORDER — MIRTAZAPINE 15 MG/1
15 TABLET, FILM COATED ORAL AT BEDTIME
Status: DISCONTINUED | OUTPATIENT
Start: 2019-01-01 | End: 2019-01-01 | Stop reason: HOSPADM

## 2019-01-01 RX ADMIN — BRIMONIDINE TARTRATE 1 DROP: 2 SOLUTION/ DROPS OPHTHALMIC at 20:16

## 2019-01-01 RX ADMIN — LOSARTAN POTASSIUM 25 MG: 25 TABLET ORAL at 08:25

## 2019-01-01 RX ADMIN — HYDROCODONE BITARTRATE AND ACETAMINOPHEN 1 TABLET: 5; 325 TABLET ORAL at 18:45

## 2019-01-01 RX ADMIN — BRIMONIDINE TARTRATE 1 DROP: 2 SOLUTION OPHTHALMIC at 19:42

## 2019-01-01 RX ADMIN — BRIMONIDINE TARTRATE 1 DROP: 2 SOLUTION/ DROPS OPHTHALMIC at 08:41

## 2019-01-01 RX ADMIN — BUSPIRONE HYDROCHLORIDE 10 MG: 10 TABLET ORAL at 17:57

## 2019-01-01 RX ADMIN — LATANOPROST 1 DROP: 50 SOLUTION/ DROPS OPHTHALMIC at 20:16

## 2019-01-01 RX ADMIN — ACETAMINOPHEN 650 MG: 325 TABLET, FILM COATED ORAL at 20:15

## 2019-01-01 RX ADMIN — LOSARTAN POTASSIUM 25 MG: 25 TABLET ORAL at 08:39

## 2019-01-01 RX ADMIN — DOCUSATE SODIUM 100 MG: 100 CAPSULE, LIQUID FILLED ORAL at 16:33

## 2019-01-01 RX ADMIN — POLYETHYLENE GLYCOL 3350 17 G: 17 POWDER, FOR SOLUTION ORAL at 02:32

## 2019-01-01 RX ADMIN — MIRTAZAPINE 15 MG: 15 TABLET, FILM COATED ORAL at 20:16

## 2019-01-01 RX ADMIN — I-VITE, TAB 1000-60-2MG (60/BT) 1 TABLET: TAB at 08:11

## 2019-01-01 RX ADMIN — AMLODIPINE BESYLATE 5 MG: 5 TABLET ORAL at 20:16

## 2019-01-01 RX ADMIN — BRIMONIDINE TARTRATE 1 DROP: 2 SOLUTION OPHTHALMIC at 20:44

## 2019-01-01 RX ADMIN — BUSPIRONE HYDROCHLORIDE 10 MG: 10 TABLET ORAL at 08:24

## 2019-01-01 RX ADMIN — AMLODIPINE BESYLATE 5 MG: 5 TABLET ORAL at 08:24

## 2019-01-01 RX ADMIN — DOCUSATE SODIUM 100 MG: 100 CAPSULE, LIQUID FILLED ORAL at 20:43

## 2019-01-01 RX ADMIN — I-VITE, TAB 1000-60-2MG (60/BT) 1 TABLET: TAB at 16:33

## 2019-01-01 RX ADMIN — DULOXETINE HYDROCHLORIDE 60 MG: 30 CAPSULE, DELAYED RELEASE ORAL at 08:40

## 2019-01-01 RX ADMIN — CEFTRIAXONE SODIUM 1 G: 1 INJECTION, SOLUTION INTRAVENOUS at 14:01

## 2019-01-01 RX ADMIN — SODIUM CHLORIDE: 9 INJECTION, SOLUTION INTRAVENOUS at 17:23

## 2019-01-01 RX ADMIN — BISACODYL 10 MG: 5 TABLET, COATED ORAL at 08:36

## 2019-01-01 RX ADMIN — HYDROCODONE BITARTRATE AND ACETAMINOPHEN 1 TABLET: 5; 325 TABLET ORAL at 19:08

## 2019-01-01 RX ADMIN — ACETAMINOPHEN 650 MG: 325 TABLET, FILM COATED ORAL at 14:16

## 2019-01-01 RX ADMIN — LEVOTHYROXINE SODIUM 50 MCG: 50 TABLET ORAL at 06:00

## 2019-01-01 RX ADMIN — SODIUM CHLORIDE: 9 INJECTION, SOLUTION INTRAVENOUS at 15:17

## 2019-01-01 RX ADMIN — POLYETHYLENE GLYCOL 3350 17 G: 17 POWDER, FOR SOLUTION ORAL at 08:36

## 2019-01-01 RX ADMIN — DULOXETINE HYDROCHLORIDE 60 MG: 30 CAPSULE, DELAYED RELEASE ORAL at 08:25

## 2019-01-01 RX ADMIN — DULOXETINE HYDROCHLORIDE 60 MG: 30 CAPSULE, DELAYED RELEASE ORAL at 08:11

## 2019-01-01 RX ADMIN — AMLODIPINE BESYLATE 5 MG: 5 TABLET ORAL at 08:11

## 2019-01-01 RX ADMIN — LOSARTAN POTASSIUM 25 MG: 25 TABLET ORAL at 19:42

## 2019-01-01 RX ADMIN — DOCUSATE SODIUM 100 MG: 100 CAPSULE, LIQUID FILLED ORAL at 14:08

## 2019-01-01 RX ADMIN — ACETAMINOPHEN 650 MG: 325 TABLET, FILM COATED ORAL at 03:29

## 2019-01-01 RX ADMIN — LATANOPROST 1 DROP: 50 SOLUTION/ DROPS OPHTHALMIC at 19:41

## 2019-01-01 RX ADMIN — LEVOTHYROXINE SODIUM 50 MCG: 50 TABLET ORAL at 06:39

## 2019-01-01 RX ADMIN — ACETAMINOPHEN 650 MG: 325 TABLET, FILM COATED ORAL at 08:35

## 2019-01-01 RX ADMIN — BRIMONIDINE TARTRATE 1 DROP: 2 SOLUTION OPHTHALMIC at 08:19

## 2019-01-01 RX ADMIN — DOCUSATE SODIUM 100 MG: 100 CAPSULE, LIQUID FILLED ORAL at 08:25

## 2019-01-01 RX ADMIN — METOPROLOL SUCCINATE 25 MG: 25 TABLET, EXTENDED RELEASE ORAL at 08:36

## 2019-01-01 RX ADMIN — SODIUM CHLORIDE: 9 INJECTION, SOLUTION INTRAVENOUS at 09:19

## 2019-01-01 RX ADMIN — DOCUSATE SODIUM 100 MG: 100 CAPSULE, LIQUID FILLED ORAL at 08:11

## 2019-01-01 RX ADMIN — DOCUSATE SODIUM 100 MG: 100 CAPSULE, LIQUID FILLED ORAL at 19:42

## 2019-01-01 RX ADMIN — BUSPIRONE HYDROCHLORIDE 10 MG: 10 TABLET ORAL at 17:24

## 2019-01-01 RX ADMIN — MIRTAZAPINE 15 MG: 15 TABLET, FILM COATED ORAL at 19:42

## 2019-01-01 RX ADMIN — BRIMONIDINE TARTRATE 1 DROP: 2 SOLUTION OPHTHALMIC at 08:26

## 2019-01-01 RX ADMIN — BUSPIRONE HYDROCHLORIDE 10 MG: 10 TABLET ORAL at 17:14

## 2019-01-01 RX ADMIN — AMLODIPINE BESYLATE 5 MG: 5 TABLET ORAL at 08:36

## 2019-01-01 RX ADMIN — BUSPIRONE HYDROCHLORIDE 10 MG: 10 TABLET ORAL at 08:36

## 2019-01-01 RX ADMIN — CEFTRIAXONE SODIUM 1 G: 1 INJECTION, SOLUTION INTRAVENOUS at 14:08

## 2019-01-01 RX ADMIN — BISACODYL 5 MG: 5 TABLET, COATED ORAL at 09:40

## 2019-01-01 RX ADMIN — I-VITE, TAB 1000-60-2MG (60/BT) 1 TABLET: TAB at 08:36

## 2019-01-01 RX ADMIN — BISACODYL 10 MG: 10 SUPPOSITORY RECTAL at 09:42

## 2019-01-01 RX ADMIN — AMLODIPINE BESYLATE 5 MG: 5 TABLET ORAL at 19:42

## 2019-01-01 RX ADMIN — SENNOSIDES 8.6 MG: 8.6 TABLET, FILM COATED ORAL at 09:40

## 2019-01-01 RX ADMIN — LOSARTAN POTASSIUM 25 MG: 25 TABLET ORAL at 08:11

## 2019-01-01 RX ADMIN — LOSARTAN POTASSIUM 25 MG: 25 TABLET ORAL at 20:43

## 2019-01-01 RX ADMIN — POLYETHYLENE GLYCOL 3350 17 G: 17 POWDER, FOR SOLUTION ORAL at 16:33

## 2019-01-01 RX ADMIN — MIRTAZAPINE 15 MG: 15 TABLET, FILM COATED ORAL at 20:44

## 2019-01-01 RX ADMIN — BUSPIRONE HYDROCHLORIDE 10 MG: 10 TABLET ORAL at 08:18

## 2019-01-01 RX ADMIN — SODIUM CHLORIDE: 9 INJECTION, SOLUTION INTRAVENOUS at 22:14

## 2019-01-01 RX ADMIN — LATANOPROST 1 DROP: 50 SOLUTION/ DROPS OPHTHALMIC at 20:44

## 2019-01-01 RX ADMIN — METOPROLOL SUCCINATE 25 MG: 25 TABLET, EXTENDED RELEASE ORAL at 08:11

## 2019-01-01 RX ADMIN — I-VITE, TAB 1000-60-2MG (60/BT) 1 TABLET: TAB at 08:25

## 2019-01-01 RX ADMIN — POLYETHYLENE GLYCOL 3350 17 G: 17 POWDER, FOR SOLUTION ORAL at 08:12

## 2019-01-01 RX ADMIN — AMLODIPINE BESYLATE 5 MG: 5 TABLET ORAL at 20:43

## 2019-01-01 RX ADMIN — LEVOTHYROXINE SODIUM 50 MCG: 50 TABLET ORAL at 06:27

## 2019-01-01 RX ADMIN — ACETAMINOPHEN 650 MG: 325 TABLET, FILM COATED ORAL at 14:46

## 2019-01-01 RX ADMIN — HYDROCODONE BITARTRATE AND ACETAMINOPHEN 1 TABLET: 5; 325 TABLET ORAL at 03:33

## 2019-01-01 RX ADMIN — ACETAMINOPHEN 650 MG: 325 TABLET, FILM COATED ORAL at 20:43

## 2019-01-01 RX ADMIN — METOPROLOL SUCCINATE 25 MG: 25 TABLET, EXTENDED RELEASE ORAL at 08:25

## 2019-01-01 RX ADMIN — LOSARTAN POTASSIUM 25 MG: 25 TABLET ORAL at 20:16

## 2019-01-01 ASSESSMENT — ACTIVITIES OF DAILY LIVING (ADL)
ADLS_ACUITY_SCORE: 19
NUMBER_OF_TIMES_PATIENT_HAS_FALLEN_WITHIN_LAST_SIX_MONTHS: 1
AMBULATION: 1-->ASSISTIVE EQUIPMENT
ADLS_ACUITY_SCORE: 19
TOILETING: 0-->INDEPENDENT
ADLS_ACUITY_SCORE: 19
FALL_HISTORY_WITHIN_LAST_SIX_MONTHS: YES
ADLS_ACUITY_SCORE: 18
DEPENDENT_IADLS:: MONEY MANAGEMENT;MEDICATION MANAGEMENT;MEAL PREPARATION;SHOPPING;LAUNDRY;COOKING;CLEANING;TRANSPORTATION
ADLS_ACUITY_SCORE: 18
ADLS_ACUITY_SCORE: 19
ADLS_ACUITY_SCORE: 17
ADLS_ACUITY_SCORE: 18
ADLS_ACUITY_SCORE: 18
SWALLOWING: 0-->SWALLOWS FOODS/LIQUIDS WITHOUT DIFFICULTY
RETIRED_COMMUNICATION: 0-->UNDERSTANDS/COMMUNICATES WITHOUT DIFFICULTY
ADLS_ACUITY_SCORE: 17
BATHING: 2-->ASSISTIVE PERSON
ADLS_ACUITY_SCORE: 19
ADLS_ACUITY_SCORE: 19
RETIRED_EATING: 0-->INDEPENDENT
COGNITION: 0 - NO COGNITION ISSUES REPORTED
ADLS_ACUITY_SCORE: 17
TRANSFERRING: 1-->ASSISTIVE EQUIPMENT
DRESS: 0-->INDEPENDENT
WHICH_OF_THE_ABOVE_FUNCTIONAL_RISKS_HAD_A_RECENT_ONSET_OR_CHANGE?: AMBULATION;TRANSFERRING;TOILETING

## 2019-01-01 ASSESSMENT — ENCOUNTER SYMPTOMS
DIARRHEA: 0
BLOOD IN STOOL: 0
ABDOMINAL PAIN: 1
COUGH: 0
WEAKNESS: 1
VOMITING: 0
DYSURIA: 0
FREQUENCY: 0
WHEEZING: 0
FEVER: 0
SORE THROAT: 0
HEADACHES: 0
SINUS PRESSURE: 0
CONSTIPATION: 1
PALPITATIONS: 0
CHILLS: 0
NAUSEA: 0
SHORTNESS OF BREATH: 0
DIAPHORESIS: 0

## 2019-01-01 ASSESSMENT — MIFFLIN-ST. JEOR
SCORE: 1031.5
SCORE: 1034.5
SCORE: 1024.5
SCORE: 1019.5

## 2019-01-15 PROBLEM — R10.84 ABDOMINAL PAIN, GENERALIZED: Status: ACTIVE | Noted: 2019-01-01

## 2019-01-15 NOTE — H&P
[facility]    History and Physical - Hospitalist Service       Date of Admission:  1/15/2019    Assessment & Plan   Lara Wills is a 89 year old female admitted on 1/15/2019. She presents with abdominal pain and weakness.    Abdominal pain, generalized  Abnormal transverse colon on CT-possible colon cancer  Generalized abdominal fullness and discomfort with tenderness in the RUQ.  Decreased appetite.  One episode of emesis about a week ago.  Chronically hard stools with occasional bouts of constipation.  No fevers.  No black stools.  Recent outpatient evaluation for bloody stools, diagnosed with external hemorrhoids.  CT shows concentric bowel wall thickening involving the transverse colon with adjacent lymphadenopathy, worrying for colon cancer.  At this time she is not interested in pursuing colonoscopy. Will request palliative care consult to help weigh her options.  - Clear liquids, advance as tolerated  - IVF at 75 mL/hr  - Palliative consult    Generalized weakness/FTT/anorexia  Approximately 1 month of feeling fatigued and weak.  Noticeably less active, not doing the things she used to enjoy including reading books.  This is likely multifactorial, could certainly be a result of malignancy with some component of seasonal depression, also acknowledges decreased oral intake.  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  Her daughter-in-law is concerned that she is no longer taking good care of herself in the assisted living environment and would like to see her placed in a TCU.  - PT/OT consults    Urinary tract infection-unclear if symptomatic  Acknowledges decreased urine output. No clear  urinary symptoms. UA shows 28 WBCs and positive for bacteria. Likely a contributing factor to her weakness and fatigue.  - Ceftriaxone 1 g q24h  - UC pending    Generalized anxiety disorder  Major depressive disorder  Chronic, with a seasonal pattern.  Per daughter-in-law, noticeably worse since the beginning of December.  Does not seem any worse than previous years.  This is likely concluding factor in her generalized weakness and fatigue.  - Continue home Buspar, Remeron, and Cymbalta.  - May require assistance identifying a drug to replace BuSpar, as she is been told she will no longer be able to fill this medication due to national shortages.    Benign essential hypertension  Chronic, appears well-managed. Reviewed outpatient BPs.  - Continue home metoprolol and losartan.    Hypothyroidism  TSH 3.06.  - Continue home levothyroxine 50 mcg       Diet: Clears, advance as tolerated  DVT Prophylaxis: Pneumatic Compression Devices  Garcia Catheter: not present  Code Status: DNR/DNI    Disposition Plan   Expected discharge: 2 - 3 days, recommended to transitional care unit once adequate pain management/ tolerating PO medications and safe disposition plan/ TCU bed available.  Entered: Stephon Cortez PA-C 01/15/2019, 12:13 PM     The patient's care was discussed with the Attending Physician, Dr. Silva, Patient and Patient's Family.    Stephon Cortez PA-C  [facility]    ______________________________________________________________________    Chief Complaint   Abdominal pain    History is obtained from the patient and patient's family    History of Present Illness   Lara Wills is a 89 year old female with history of hypertension, hypothyroidism, anxiety, and depression who presents with increasing abdominal discomfort and tenderness over the last few months.  She has noted decreased appetite and occasional nausea.  Some constipation / hard stools over the last couple months, on top  "of chronic hard stools. Last bowel movement was yesterday.  Recently prescribed MiraLAX for this but did not like the stool consistency it produced as made it more difficult for her to clean herself. Increasing fatigue for the last several weeks with generalized weakness.  She acknowledges she is not eating well and is definitely not drinking enough fluids.     She did report some left-sided weakness to the ED physician, however it seems this is been going on for months to years.  The left leg weakness began when she first developed sciatic symptoms due to spinal stenosis in the left arm weakness began after dislocating it in February 2018.  She denies headaches, confusion, slurred speech, lack of coordination, or any other symptoms that would be clearly concerning for CVA.    She is extremely clear that she has no interest in life sustaining treatments.  She is DNR/DNI and also declines feeding tubes.  She repeatedly states \"I wish I could die\" and \"I do not know why I am still here.\".  She is concerned about being a burden on her family.    Review of Systems    The 10 point Review of Systems is negative other than noted in the HPI or here.     Past Medical History    I have reviewed this patient's medical history and updated it with pertinent information if needed.   No past medical history on file.    Past Surgical History   I have reviewed this patient's surgical history and updated it with pertinent information if needed.  Past Surgical History:   Procedure Laterality Date     APPENDECTOMY  1947     BREAST BIOPSY, RT/LT      right - benign     CATARACT IOL, RT/LT      bilateral     partial thyroidectomy       VITRECTOMY,STRIP EPIRETINAL MEMBRANE  2002       Social History   I have reviewed this patient's social history and updated it with pertinent information if needed.  Social History     Tobacco Use     Smoking status: Never Smoker     Smokeless tobacco: Never Used   Substance Use Topics     Alcohol use: No "     Drug use: No       Family History   I have reviewed this patient's family history and updated it with pertinent information if needed.   Family History   Problem Relation Age of Onset     C.A.D. Mother      Cancer - colorectal Father      Alzheimer Disease Brother      Neurologic Disorder Brother         parkinson     Cancer Daughter         lymphoma       Prior to Admission Medications   Prior to Admission Medications   Prescriptions Last Dose Informant Patient Reported? Taking?   DULoxetine (CYMBALTA) 60 MG EC capsule  Care Giver Yes No   Sig: Take 60 mg by mouth daily   VITAMIN D, CHOLECALCIFEROL, PO  Care Giver Yes No   Sig: Take 2,000 Units by mouth daily At 1200   amLODIPine (NORVASC) 5 MG tablet   No No   Sig: TAKE 1 TABLET BY MOUTH TWICE DAILY   brimonidine (ALPHAGAN) 0.2 % ophthalmic solution  Care Giver Yes No   Sig: Place 1 drop Into the left eye 2 times daily   busPIRone (BUSPAR) 10 MG tablet  Care Giver Yes No   Sig: Take 10 mg by mouth 2 times daily 0800, 1700   calcium-vitamin D (CALTRATE) 600-400 MG-UNIT per tablet  Care Giver Yes No   Sig: Take 1 tablet by mouth 2 times daily At 1200, and 8 pm   docusate sodium (COLACE) 100 MG capsule  Care Giver Yes No   Sig: Take 100 mg by mouth 3 times daily 0800, 1200, 2000   latanoprost (XALATAN) 0.005 % ophthalmic solution  Care Giver Yes No   Sig: Place 1 drop into both eyes At Bedtime   levothyroxine (SYNTHROID/LEVOTHROID) 50 MCG tablet   No No   Sig: Take 1 tablet (50 mcg) by mouth daily   losartan (COZAAR) 25 MG tablet   No No   Sig: TAKE 1 TABLET (25 MG) BY MOUTH 2 TIMES DAILY   metoprolol succinate (TOPROL-XL) 25 MG 24 hr tablet   No No   Sig: Take 1 tablet (25 mg) by mouth daily   mirtazapine (REMERON) 15 MG tablet  Care Giver Yes No   Sig: Take 15 mg by mouth At Bedtime   multivitamin  with lutein (OCUVITE WITH LTEIN) CAPS  Care Giver Yes No   Sig: Take 1 capsule by mouth daily At 1200   order for DME  Care Giver No No   Sig: Oxygen 2 Li/min  at  night      Facility-Administered Medications: None     Allergies   Allergies   Allergen Reactions     Codeine Sulfate Unknown     Penicillins Unknown       Physical Exam   Vital Signs: Temp: 98  F (36.7  C) Temp src: Oral BP: 111/66 Pulse: 75   Resp: 18 SpO2: 95 % O2 Device: None (Room air)    Weight: 161 lbs 0 oz    General: Appears calm, comfortable, nontoxic. Answers questions quickly and appropriately with clear speech. No apparent distress.  Skin: Pink, warm, dry.  Eyes: PERRL. Sclera are white.  HENT:  Normocephalic, atraumatic. Oropharynx is moist, without lesions or exudate.  Lymph/Hematologic: No occipital, anterior/posterior cervical, supraclavicular, or axillary lymphadenopathy.  CV: RRR, clear S1/S2 without rub or gallop. Systolic murmur noted. Radial pulses equal bilaterally. No lower extremity edema.  Respiratory: CTA and equal bilaterally. Normal respiratory effort.  GI: Soft, diffusely tender with acute tenderness in RUQ. Normal bowel sounds.  Musculoskeletal: Moving all extremities well. Normal muscle bulk and tone.  Neuro: Memory and cognition appear normal. CN II - XII grossly intact. Symmetrical extremity strength.  Psych: Normal mood and affect.         Data   Data reviewed today: I reviewed all medications, new labs and imaging results over the last 24 hours. I personally reviewed the EKG tracing showing SR with LBBB without ischemic changes and the chest x-ray image(s) showing no effusions or infiltrates with normal cardiac silhouette.    Recent Labs   Lab 01/15/19  0946   WBC 12.5*   HGB 12.4   MCV 90   *      POTASSIUM 3.8   CHLORIDE 99   CO2 30   BUN 15   CR 0.77   ANIONGAP 6   EVA 8.7   *   ALBUMIN 2.7*   PROTTOTAL 6.5*   BILITOTAL 0.4   ALKPHOS 79   ALT 9   AST 14   LIPASE 129     Recent Results (from the past 24 hour(s))   Abd/pelvis CT - no contrast - Stone Protocol    Narrative    CT ABDOMEN/PELVIS WITHOUT CONTRAST January 15, 2019 10:21 AM     HISTORY: Abdomen pain  and tenderness for several weeks. Generalized  weakness. Broad based work-up, will start with non-contrast CT.    TECHNIQUE: CT of the abdomen and pelvis was performed without  intravenous contrast. Radiation dose for this scan was reduced using  automated exposure control, adjustment of the mA and/or kV according  to patient size, or iterative reconstruction technique.    COMPARISON: None.    FINDINGS: There is concentric transmural bowel wall thickening  involving the left side of the transverse colon near its junction with  the splenic flecture. This narrows the lumen of the colon in this  region. On the coronal images, they are adjacent round nodules which  likely represent enlarged lymph nodes. The largest of these measures  1.7 cm in maximum dimension. There is a tiny amount of free fluid  within the dependent pelvis. No free air. There is an anterior  abdominal wall hernia containing mesenteric fat. There is a moderate  hiatal hernia.    Evaluation of the solid organs in the abdomen is limited due to a lack  of intravenous contrast. No gross abnormalities are seen.      Impression    IMPRESSION: Concentric bowel wall thickening involving the transverse  colon as above with adjacent lymphadenopathy. Findings would be  worrisome for a colon cancer. There is narrowing of the lumen of the  colon but no evidence for bowel obstruction at this time. Differential  diagnosis could include a focal colitis however, neoplastic etiology  is favored. Further evaluation with a colonoscopy could be performed.    MATILDA LIN MD   Chest XR,  PA & LAT    Narrative    CHEST TWO VIEWS  1/15/2019 10:30 AM     HISTORY: 89-year-old woman with weakness.       Impression    IMPRESSION: Since February 6, 2018, heart size remains upper limit of  normal. Retrocardiac density is again noted which may be a hiatal  hernia. Patient had a CT exam performed at a similar time, confirming  hiatal hernia as opposed to a thoracic aortic  aneurysm. No pleural  effusion, pneumothorax, or abnormal area of consolidation. Minimal  linear opacities in the inferolateral left lung likely atelectasis or  scar formation.    COLLEEN MCCLENDON MD

## 2019-01-15 NOTE — PROGRESS NOTES
Skin affirmation note    Admitting nurse completed full skin assessment, Bruno score and Bruno interventions.  Skin is intact, small nearly healed scab on right great toe.

## 2019-01-15 NOTE — CONSULTS
[facility]    Palliative Care Consultation--Inpatient  Admission Date: 1/15/2019   Visit Date: January 15, 2019  PCP: Alejandra Watson   Requested by: Fabrizio Silva MD      HISTORY of PRESENT ILLNESS:  Lara Wills is a 89 year old year old female who presented to the ED due to weakness and depressed mood that became more pronounced over the past several weeks.  She also had c/o suprapublic tenderness and mild abdominal pain which, upon CT evaluation, appears to be associated with a newly discovered transverse colon changes suggestive of colon cancer.  She was referred to Palliative Care for exploration of treatment options and goals of care.      ASSESSMENT & PLAN:    # Chronic constipation, noncompliance with prescribed Miralax   > daily Miralax  > discontinue Oscal for now    # Depression, worse during the winter season;  has a SAD light at home but again is noncompliant with its use  > son to bring in Bright Light previously prescribed by PCP; will use it here if it checks out OK by Facilities.    > consider initiation of Bupropion  mg daily    # Advance Care Planning:  > Understanding of condition:  Knows she probably has colon cancer  > Decisional capacity:  intact  > Code status:  DNR / DNI  > Health Care Directive: Yes, If Yes, is there a copy in the EMR?   Yes  > POLST:  Yes, copy in EMR?:  Yes    # Goals of Care:  > agreeable to [facility] TCU upon discharge; PT/OT orders placed  > not interested in colonoscopy, biopsy, any/all cancer treatments.    > not interested in hospice yet; appears to think that hospice is just for the last few days, but she wasn't open to hearing more at this time.  I don't recommend hospice at this point anyhow as the patient is at risk for readmission due to bowel obstruction due to persistent noncompliance with use of Miralax despite best efforts of her primary caregiver, MARVIN Anh.  Patient reportedly prefers hard, marble-like stools as it's easier to clean  herself afterward than if/when stools loose/soft.         Thank you for the opportunity to be of service to this patient and family.      Pio Sen CNP ([place])  Palliative Care  Private cell:  [phone]     Face to face:   1525 - 1625, 60 min, > 50% spent discussing symptoms, goals of care, medication compliance, self-care abilities/limitations.   Non face to face:   [id number] and 1625 - 1725, 100 min, > 50% spent coordinating care with  attending, nursing and Care Transitions; and speaking outside pt's room with son Evert and his wife Anh regarding placement options following TCU.       = = = = = = = = = = = = = = = =      PROBLEM LIST  Patient Active Problem List   Diagnosis     Advance Care Planning     Hyperlipidemia with target LDL less than 130     Chronic constipation     OA (osteoarthritis) of knee     Generalized anxiety disorder     Major depressive disorder, single episode, mild (H)     Benign essential hypertension     Hypothyroidism, unspecified type     Shoulder dislocation, left, initial encounter     Closed supracondylar fracture of left humerus     Fall     Delirium     Hypoxemia     Interstitial fibrosis     Health Care Home     Mild cognitive impairment     Undiagnosed cardiac murmurs     Abdominal pain, generalized       PAST MEDICAL HISTORY  No past medical history on file.    PAST SURGICAL HISTORY  Past Surgical History:   Procedure Laterality Date     APPENDECTOMY  1947     BREAST BIOPSY, RT/LT      right - benign     CATARACT IOL, RT/LT      bilateral     partial thyroidectomy       VITRECTOMY,STRIP EPIRETINAL MEMBRANE  2002       FAMILY MEDICAL HISTORY  Family History   Problem Relation Age of Onset     C.A.D. Mother      Cancer - colorectal Father      Alzheimer Disease Brother      Neurologic Disorder Brother         parkinson     Cancer Daughter         lymphoma       SOCIAL HISTORY  History   Smoking Status     Never Smoker   Smokeless Tobacco     Never Used     Social History     Substance and Sexual Activity      Alcohol use: No    History   Drug Use No     Social History     Social History Narrative    January 15, 2019.  .  Has 1 daughter and 3 sons, two of whom are in their 70's.  Never smoked.  Lives in an assisted living apartment on the [facility].  Her DIL Anh visits daily to manage meds and assist with bathing as needed.  However, Anh expressed caregiver burnout as she is also caring for her own debilitated parents, and expressed her inability to continue providing cares as she has to Lara--Anh and spouse (pt's son) Evert are hopeful that Lara will [facility] in the near future.      Pio Campbell) ABISAI Sen    Palliative Care    Private cell:  [phone]        SPIRITUAL HISTORY  Religion; discussed Anointing of the Sick, but patient declined--thinks that should be done when death is imminent.     ALLERGIES  Allergies   Allergen Reactions     Codeine Sulfate Unknown     Penicillins Unknown       MEDICATIONS  Medications Prior to Admission    No current facility-administered medications on file prior to encounter.   Current Outpatient Medications on File Prior to Encounter:  acetaminophen (TYLENOL) 500 MG tablet Take 1,000 mg by mouth every 8 hours as needed for mild pain   amLODIPine (NORVASC) 5 MG tablet TAKE 1 TABLET BY MOUTH TWICE DAILY   brimonidine (ALPHAGAN) 0.2 % ophthalmic solution Place 1 drop into both eyes 2 times daily    busPIRone (BUSPAR) 10 MG tablet Take 10 mg by mouth 2 times daily 0800, 1700   calcium-vitamin D (CALTRATE) 600-400 MG-UNIT per tablet Take 1 tablet by mouth 2 times daily At 1200, and 8 pm   docusate sodium (COLACE) 100 MG capsule Take 100 mg by mouth 3 times daily 0800, 1200, 2000   DULoxetine (CYMBALTA) 60 MG EC capsule Take 60 mg by mouth daily   latanoprost (XALATAN) 0.005 % ophthalmic solution Place 1 drop into both eyes At Bedtime   levothyroxine (SYNTHROID/LEVOTHROID) 50 MCG tablet Take 1 tablet (50 mcg) by  mouth daily   losartan (COZAAR) 25 MG tablet TAKE 1 TABLET (25 MG) BY MOUTH 2 TIMES DAILY   metoprolol succinate (TOPROL-XL) 25 MG 24 hr tablet Take 1 tablet (25 mg) by mouth daily   mirtazapine (REMERON) 15 MG tablet Take 15 mg by mouth At Bedtime   multivitamin  with lutein (OCUVITE WITH LTEIN) CAPS Take 1 capsule by mouth daily At 1200   order for DME Oxygen 2 Li/minat night   polyethylene glycol (MIRALAX/GLYCOLAX) powder Take 1 capful by mouth daily   VITAMIN D, CHOLECALCIFEROL, PO Take 2,000 Units by mouth daily At 1200       Current Medications    amLODIPine  5 mg Oral BID     brimonidine  1 drop Left Eye BID     busPIRone  10 mg Oral BID     cefTRIAXone  1 g Intravenous Q24H     docusate sodium  100 mg Oral TID     [START ON 1/16/2019] DULoxetine  60 mg Oral Daily     latanoprost  1 drop Both Eyes At Bedtime     [START ON 1/16/2019] levothyroxine  50 mcg Oral QAM AC     losartan  25 mg Oral BID     [START ON 1/16/2019] metoprolol succinate ER  25 mg Oral Daily     mirtazapine  15 mg Oral At Bedtime     multivitamin  1 tablet Oral Daily     polyethylene glycol  17 g Oral Daily       PRN Medications  acetaminophen, HYDROcodone-acetaminophen, HYDROmorphone, melatonin, naloxone, ondansetron **OR** ondansetron, prochlorperazine **OR** prochlorperazine **OR** prochlorperazine      REVIEW OF SYSTEMS  Chronic constipation apparently/reportedly by choice--refused Miralax and prefers hard stools as it is easier to clean herself afterwards.  External hemorrhoids recently found in investigation of recent BRBPR/on toilet paper. Little appetite.  No nausea.  Normal weight around 160 lbs; last clinic weight was unchanged from what it was 6 months ago. Chronic poor sleep, but doesn't expect to sleep any better than she currently is doing.  Uses O2 at HS for hypoxia, not during the day. Drinks very little fluid--DIL estimates only 8 oz per day, despite encouragement.  Ambulates within her apartment with a walker, and is  "minimally active beyond that; she walks to the Dining Room, but it's not far from her apartment.  Sits most of the day.  Admits to being depressed and ready for death, but not for hospice and not for Last Rites.  Used to enjoy reading, not any more.  No back pain at present.  Vomited x 1 about 10 days ago, none since.  Most recent fall was about a year ago.  Son Evert states she is always \"negative.\"  Has a Bright Light, presumably for SAD, but does not use it.  Son also reports her memory has been \"slipping significantly\" over the past several months.      Performance Assessment:    Palliative Performance Scale, v.2     50%  Extensive disease. Normal or reduced intake; normal LOC or confusion; little ambulation (mainly sit/lie), ADLs w/much assistance, unable to do any work.        PHYSICAL EXAMINATION  Patient Vitals for the past 24 hrs:   BP Temp Temp src Pulse Resp SpO2 Height Weight   01/15/19 1618 133/70 98  F (36.7  C) Oral 78 18 94 % -- --   01/15/19 1431 131/69 98.3  F (36.8  C) Oral 78 18 93 % 1.524 m (5') 70.8 kg (156 lb 1.4 oz)   01/15/19 1300 (!) 153/97 -- -- 87 -- 94 % -- --   01/15/19 1200 122/71 -- -- 78 -- 91 % -- --   01/15/19 1100 114/63 -- -- 71 -- 92 % -- --   01/15/19 1000 111/66 -- -- 75 -- 95 % -- --   01/15/19 0845 111/71 98  F (36.7  C) Oral 93 18 94 % -- 73 kg (161 lb)      Wt Readings from Last 5 Encounters:   01/15/19 70.8 kg (156 lb 1.4 oz)   12/10/18 72.6 kg (160 lb)   08/16/18 73 kg (161 lb)   03/26/18 73 kg (161 lb)   03/05/18 74.4 kg (164 lb)     Constitutional:  Pleasant; appears stated age  Eyes:  Anicteric, PERRL  HEENT:  OP pink, moist, no thrush  Lymph/Hematologic:  No epitrochlear, axillary, anterior or posterior cervical, or supraclavicular lymphadenopathy is appreciated; soft mass palplable left lateral to umbilicus--lymph node?  Cardiovascular:  RRR w/soft murmur radiating to L axilla  Respiratory:  Clear, respirations unlabored but O2 per nc  GI: Soft, non-tender, normal " bowel sounds, no hepatosplenomegaly, periumbilical palpable mass as noted above  Genitourinary:  deferred  Musculoskeletal:  NITESH x 4  Skin:  Warm, dry, like tissue paper  Neurological:  nonfocal  Psych:  Repeatedly confused her  for her father when reporting family history of colon cancer.  Unable to recall timing of recent symptoms--looked to MARVIN Kamara for those answers      DATA  ROUTINE ICU LABS (Last four results)  CMP  Recent Labs   Lab 01/15/19  0946      POTASSIUM 3.8   CHLORIDE 99   CO2 30   ANIONGAP 6   *   BUN 15   CR 0.77   GFRESTIMATED 65   GFRESTBLACK 76   EVA 8.7   PROTTOTAL 6.5*   ALBUMIN 2.7*   BILITOTAL 0.4   ALKPHOS 79   AST 14   ALT 9     CBC  Recent Labs   Lab 01/15/19  0946   WBC 12.5*   RBC 4.24   HGB 12.4   HCT 38.0   MCV 90   MCH 29.2   MCHC 32.6   RDW 12.3   *     INRNo lab results found in last 7 days.  Arterial Blood GasNo lab results found in last 7 days.      Recent Results (from the past 48 hour(s))   Abd/pelvis CT - no contrast - Stone Protocol    Narrative    CT ABDOMEN/PELVIS WITHOUT CONTRAST January 15, 2019 10:21 AM     HISTORY: Abdomen pain and tenderness for several weeks. Generalized  weakness. Broad based work-up, will start with non-contrast CT.    TECHNIQUE: CT of the abdomen and pelvis was performed without  intravenous contrast. Radiation dose for this scan was reduced using  automated exposure control, adjustment of the mA and/or kV according  to patient size, or iterative reconstruction technique.    COMPARISON: None.    FINDINGS: There is concentric transmural bowel wall thickening  involving the left side of the transverse colon near its junction with  the splenic flecture. This narrows the lumen of the colon in this  region. On the coronal images, they are adjacent round nodules which  likely represent enlarged lymph nodes. The largest of these measures  1.7 cm in maximum dimension. There is a tiny amount of free fluid  within the dependent  pelvis. No free air. There is an anterior  abdominal wall hernia containing mesenteric fat. There is a moderate  hiatal hernia.    Evaluation of the solid organs in the abdomen is limited due to a lack  of intravenous contrast. No gross abnormalities are seen.      Impression    IMPRESSION: Concentric bowel wall thickening involving the transverse  colon as above with adjacent lymphadenopathy. Findings would be  worrisome for a colon cancer. There is narrowing of the lumen of the  colon but no evidence for bowel obstruction at this time. Differential  diagnosis could include a focal colitis however, neoplastic etiology  is favored. Further evaluation with a colonoscopy could be performed.    MATILDA LIN MD   Chest XR,  PA & LAT    Narrative    CHEST TWO VIEWS  1/15/2019 10:30 AM     HISTORY: 89-year-old woman with weakness.       Impression    IMPRESSION: Since February 6, 2018, heart size remains upper limit of  normal. Retrocardiac density is again noted which may be a hiatal  hernia. Patient had a CT exam performed at a similar time, confirming  hiatal hernia as opposed to a thoracic aortic aneurysm. No pleural  effusion, pneumothorax, or abnormal area of consolidation. Minimal  linear opacities in the inferolateral left lung likely atelectasis or  scar formation.    COLLEEN MCCLENDON MD   Head CT w/o contrast    Narrative    CT SCAN OF THE HEAD WITHOUT CONTRAST   1/15/2019 2:00 PM     HISTORY: Generalized weakness left greater than right.    TECHNIQUE:  Axial images of the head and coronal reformations without  IV contrast material. Radiation dose for this scan was reduced using  automated exposure control, adjustment of the mA and/or kV according  to patient size, or iterative reconstruction technique.    COMPARISON: None.    FINDINGS: There is no evidence of intracranial hemorrhage, mass, acute  infarct or anomaly. There is generalized atrophy of the brain. There  is low attenuation in the white matter of  the cerebral hemispheres  consistent with sequelae of small vessel ischemic disease. Ventricular  size is within normal limits without evidence of hydrocephalus.     The visualized portions of the sinuses and mastoids appear normal. The  bony calvarium and bones of the skull base appear intact.       Impression    IMPRESSION:     1. No evidence of acute intracranial hemorrhage, mass, or herniation.  2. There is generalized atrophy of the brain. White matter changes are  present in the cerebral hemispheres that are consistent with small  vessel ischemic disease in this age patient.     CASSI SHEPHERD MD

## 2019-01-15 NOTE — PLAN OF CARE
[place] [facility] [place]    Patient admitted to room 2305 at approximately 1420 via cart from emergency room. Patient was accompanied by transport tech.     Verbal SBAR report received from Antonia prior to patient arrival.     Patient ambulated to bed with one assist. Patient alert and oriented X 3. The patient is not having any pain.  . Admission vital signs: Blood pressure 131/69, pulse 78, temperature 98.3  F (36.8  C), temperature source Oral, resp. rate 18, height 1.524 m (5'), weight 70.8 kg (156 lb 1.4 oz), SpO2 93 %, not currently breastfeeding. Patient was oriented to plan of care, call light, bed controls, tv, telephone and bathroom.     Risk Assessment    The following safety risks were identified during admission: fall. Yellow risk band applied: YES.     Skin Initial Assessment    This writer admitted this patient and completed a full skin assessment and Bruno score in the Adult PCS flowsheet. Appropriate interventions initiated as needed.     Secondary skin check completed by Maria Del Carmen.    Bruno Risk Assessment  Sensory Perception: 4-->no impairment  Moisture: 4-->rarely moist  Activity: 3-->walks occasionally  Mobility: 3-->slightly limited  Nutrition: 3-->adequate  Friction and Shear: 3-->no apparent problem  Bruno Score: 20  Bed Support Surface: Atmos Air mattress  Reassessed using Bed Algorithm: No  Bruno Intervention(s) Implemented: patient education on pressure relief reinforced, HOB elevated 30 degrees or less    Ramandeep Trujillo

## 2019-01-15 NOTE — PROGRESS NOTES
12:55 PM  Pt with weakness, lethargy, some recent abd pain.  Some recent constipation with brbpr thought to be hemorrhoidal.  Appetite is reduced, feels depressed.  Unable to manage at home.  Brought in by daughter who is care giver.  Possible left sided weakness but this may be chronic and related to spinal stenosis.  Work up shows mildly elevated wbc, abd ct shows transverse colon  lesion consistent with colon cancer.   Pt tender in epigastrium to mid abdomen.  Pt not sure if she would want dove for colon.  Will involve palliative care.  Pt will likely need tcu/ltc.  CT head, UA is pending.

## 2019-01-15 NOTE — LETTER
Transition Communication Hand-off for Care Transitions to Next Level of Care Provider    Name: Lara Wills  : 1922  MRN #: [id number]  Primary Care Provider: Alejandra Watson  Primary Care MD Name: Alejandra Watson MD  Primary Clinic: [address] [facility] [address]  Primary Care Clinic Name: [facility]  Reason for Hospitalization:  Generalized muscle weakness [M62.81]  Lesion of colon [K63.9]  Admit Date/Time: 1/15/2019  9:09 AM  Discharge Date: 19  Payor Source: Payor: COMMERCIAL / Plan: [employer] [facility] MEDICARE ADVANTAGE / Product Type: HMO /     Readmission Assessment Measure (ABHAY) Risk Score/category: average         Reason for Communication Hand-off Referral: Fragility       Concern for non-adherence with plan of care:   Y/N no  Discharge Needs Assessment:  Needs      Most Recent Value   Equipment Currently Used at Home  grab bar, toilet, grab bar, tub/shower, shower chair, walker, rolling        Follow-up specialty is recommended: No    Follow-up plan:  No future appointments.    Any outstanding tests or procedures:    Procedures     Future Labs/Procedures    Oxygen - Nasal cannula     Comments:    2 Lpm by nasal cannula to keep O2 sats 92% or greater.                Key Recommendations:  Pt is discharging to [facility] (Main: [phone] Admissions: [phone] Fax: [phone]) today. She will start on a medicare benefit but will most likely transition to a LTC bed at that facility. Family in agreement.     Tracy Beckman MSW, LICSW, [phone]    AVS/Discharge Summary is the source of truth; this is a helpful guide for improved communication of patient story

## 2019-01-15 NOTE — ED PROVIDER NOTES
History     Chief Complaint   Patient presents with     Depression     patient depressed.  states just doesnt feel good and feels weak. No chest pain or SOA     Generalized Weakness     HPI  Lara Wills is a 89 year old female who presents with generalized weakness developing over the last 3 weeks with prior recurrent episodes each winter related to depressed mood.  In 2017 she had a similar episode when she was admitted to transitional care because of similar symptoms.  She does have hypoxia at nighttime and changes on x-ray consistent with interstitial fibrosis and uses home oxygen.  Recently over the last 3 weeks she has had increasing to decreased generalized weakness without obvious dyspnea and without near syncope or disequilibrium.  She does note possible left-sided weakness.  She however had no abrupt onset of strokelike symptoms, seizure, head injury.  She has had no falls.  She uses a walker at home.  She has been able to perform her own activities of daily living.  She does note lower abdominal discomfort periodically but no associated nausea vomiting.  She does have a decreased appetite.  No diarrhea.  Does note constipation is recently on MiraLAX for that.  She previously been having some blood on the toilet paper.   She denies any chest pain.  She denies dyspnea on exertion or any reduced walk distance.  Does note some of her abdominal pain is in the suprapubic region and left lower quadrant.  No dysuria urgency frequency or hematuria.     She does have hypothyroidism with last TSH mildly elevated in the fall.  Normal T4 free at that time.  She lives at an assisted living.  Her  is her daughter-in-law who stays with her each day and has done so for the last 9 years.    Problem List:    Patient Active Problem List    Diagnosis Date Noted     Mild cognitive impairment 02/23/2018     Priority: Medium     Undiagnosed cardiac murmurs 02/23/2018     Priority: Medium     Interstitial  fibrosis 02/06/2018     Priority: Medium     Noted on CXR 2/2018, was also present on CXR 12/2013, but has probably progressed slightly.       Delirium 02/05/2018     Priority: Medium     Hypoxemia 02/05/2018     Priority: Medium     Shoulder dislocation, left, initial encounter 02/03/2018     Priority: Medium     Closed supracondylar fracture of left humerus 02/03/2018     Priority: Medium     Fall 02/03/2018     Priority: Medium     Hypothyroidism, unspecified type 11/09/2016     Priority: Medium     Benign essential hypertension 08/31/2016     Priority: Medium     Major depressive disorder, single episode, mild (H) 05/20/2016     Priority: Medium     Advance Care Planning 12/11/2013     Priority: Medium     Advance Care Planning 4/10/2017: Receipt of ACP document:  Received: POLST which was signed and dated by provider on 1/10/2017.  Document previously scanned on 1/23/2017.  Previous POLST received, signed and dated by provider on 8/19/2014. Orders reviewed and found to be valid.  Code Status needs to be updated to reflect choices in most recent ACP document. POLST states DNR/DNR. Confirmed/documented designated decision maker(s).  Added by Florence Puentes [facility] Advance Care Planning Liaison  Advance Care Planning 1/14/2014: Receipt of ACP document:  Received: Health Care Directive which was witnessed or notarized on 10/16/2009.  Document not previously scanned.  Validation form completed and sent with document to be scanned.  Code Status reflects choices in most recent ACP document.  Confirmed/documented designated decision maker(s). See permanent comments section of demographics in clinical tab. View document(s) and details by clicking on code status. Added by Milli Nunez on 1/14/2014.  Advance Care Planning 12/20/2013: Receipt of ACP document:  Received: invalid HCD document which was incomplete and not dated.  Document not previously scanned. Validation form completed indicating invalid document. Copy sent to  client with information and facilitation resources. Validation form sent to be scanned as notation of invalid document received.  Code Status needs to be updated to reflect choices. Confirmed/documented designated decision maker(s). View document(s) and details by clicking on code status. Added by Kim Curry RN, System ACP Coordinator on 12/20/2013.  Advance Care Planning 12/11/2013: Patient has completed an Advance/Health Care Directive (HCD), to bring in copy to be scanned into Epic.Apple [employer]ill December 11, 2013       Hyperlipidemia with target LDL less than 130 12/11/2013     Priority: Medium     Chronic constipation 12/11/2013     Priority: Medium     OA (osteoarthritis) of knee 12/11/2013     Priority: Medium     Generalized anxiety disorder 12/11/2013     Priority: Medium     Health Care Home 02/07/2018     Priority: Low     *See Letters for H Care Plan: My Access Plan            Past Medical History:    No past medical history on file.    Past Surgical History:    Past Surgical History:   Procedure Laterality Date     APPENDECTOMY  1947     BREAST BIOPSY, RT/LT      right - benign     CATARACT IOL, RT/LT      bilateral     partial thyroidectomy       VITRECTOMY,STRIP EPIRETINAL MEMBRANE  2002       Family History:    Family History   Problem Relation Age of Onset     C.A.D. Mother      Cancer - colorectal Father      Alzheimer Disease Brother      Neurologic Disorder Brother         parkinson     Cancer Daughter         lymphoma       Social History:  Marital Status:   [5]  Social History     Tobacco Use     Smoking status: Never Smoker     Smokeless tobacco: Never Used   Substance Use Topics     Alcohol use: No     Drug use: No        Medications:      amLODIPine (NORVASC) 5 MG tablet   brimonidine (ALPHAGAN) 0.2 % ophthalmic solution   busPIRone (BUSPAR) 10 MG tablet   calcium-vitamin D (CALTRATE) 600-400 MG-UNIT per tablet   docusate sodium (COLACE) 100 MG capsule   DULoxetine (CYMBALTA) 60  MG EC capsule   latanoprost (XALATAN) 0.005 % ophthalmic solution   levothyroxine (SYNTHROID/LEVOTHROID) 50 MCG tablet   losartan (COZAAR) 25 MG tablet   metoprolol succinate (TOPROL-XL) 25 MG 24 hr tablet   mirtazapine (REMERON) 15 MG tablet   multivitamin  with lutein (OCUVITE WITH LTEIN) CAPS   order for DME   VITAMIN D, CHOLECALCIFEROL, PO         Review of Systems   Constitutional: Negative for chills, diaphoresis and fever.   HENT: Negative for ear pain, sinus pressure and sore throat.    Eyes: Negative for visual disturbance.   Respiratory: Negative for cough, shortness of breath and wheezing.    Cardiovascular: Negative for chest pain and palpitations.   Gastrointestinal: Positive for abdominal pain and constipation. Negative for blood in stool, diarrhea, nausea and vomiting.   Genitourinary: Negative for dysuria, frequency and urgency.   Skin: Negative for rash.   Neurological: Positive for weakness. Negative for headaches.   All other systems reviewed and are negative.      Physical Exam   BP: 111/71  Pulse: 93  Temp: 98  F (36.7  C)  Resp: 18  Weight: 73 kg (161 lb)  SpO2: 94 %      Physical Exam   Constitutional: She appears distressed.   HENT:   Mouth/Throat: Oropharynx is clear and moist.   Eyes: Conjunctivae are normal.   Neck: Neck supple.   Cardiovascular: Normal rate and regular rhythm. Exam reveals no gallop and no friction rub.   No murmur heard.  Pulmonary/Chest: Effort normal. No stridor. No respiratory distress. She has no wheezes. She has no rales.   Abdominal: Soft. Bowel sounds are normal. She exhibits no distension and no mass. There is tenderness in the left upper quadrant and left lower quadrant. There is no guarding.   Musculoskeletal: She exhibits no edema.   Neurological: She is alert. She is not disoriented. She displays no tremor. No cranial nerve deficit. She exhibits normal muscle tone. She displays no seizure activity. Coordination normal. GCS eye subscore is 4. GCS verbal  subscore is 5. GCS motor subscore is 6.   Skin: No rash noted. She is not diaphoretic.   Psychiatric: She exhibits a depressed mood. She exhibits abnormal recent memory.       ED Course        Procedures                 EKG Interpretation:      Interpreted by Elie Landry MD  EKG done at 954 hours demonstrates a sinus rhythm at 77 bpm with a leftward axis and no ST change.  No T wave changes.  Overall poor R progression V1 through V5 6 with inferior Q waves in 3 and aVF.  QRS widening of 124.  Suspect likely partial left bundle branch block.  There is a PAC present.  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  No obvious acute changes.  This is a EKG is very similar to the one done in February 3, 2018.    Critical Care time:  none               Results for orders placed or performed during the hospital encounter of 01/15/19 (from the past 24 hour(s))   CBC with platelets differential   Result Value Ref Range    WBC 12.5 (H) 4.0 - 11.0 10e9/L    RBC Count 4.24 3.8 - 5.2 10e12/L    Hemoglobin 12.4 11.7 - 15.7 g/dL    Hematocrit 38.0 35.0 - 47.0 %    MCV 90 78 - 100 fl    MCH 29.2 26.5 - 33.0 pg    MCHC 32.6 31.5 - 36.5 g/dL    RDW 12.3 10.0 - 15.0 %    Platelet Count 577 (H) 150 - 450 10e9/L    Diff Method Automated Method     % Neutrophils 79.6 %    % Lymphocytes 8.8 %    % Monocytes 9.8 %    % Eosinophils 0.6 %    % Basophils 0.7 %    % Immature Granulocytes 0.5 %    Nucleated RBCs 0 0 /100    Absolute Neutrophil 10.0 (H) 1.6 - 8.3 10e9/L    Absolute Lymphocytes 1.1 0.8 - 5.3 10e9/L    Absolute Monocytes 1.2 0.0 - 1.3 10e9/L    Absolute Eosinophils 0.1 0.0 - 0.7 10e9/L    Absolute Basophils 0.1 0.0 - 0.2 10e9/L    Abs Immature Granulocytes 0.1 0 - 0.4 10e9/L    Absolute Nucleated RBC 0.0    Comprehensive metabolic panel   Result Value Ref Range    Sodium 135 133 - 144 mmol/L    Potassium 3.8 3.4 - 5.3 mmol/L    Chloride 99 94 - 109  mmol/L    Carbon Dioxide 30 20 - 32 mmol/L    Anion Gap 6 3 - 14 mmol/L    Glucose 108 (H) 70 - 99 mg/dL    Urea Nitrogen 15 7 - 30 mg/dL    Creatinine 0.77 0.52 - 1.04 mg/dL    GFR Estimate 65 >60 mL/min/[1.73_m2]    GFR Estimate If Black 76 >60 mL/min/[1.73_m2]    Calcium 8.7 8.5 - 10.1 mg/dL    Bilirubin Total 0.4 0.2 - 1.3 mg/dL    Albumin 2.7 (L) 3.4 - 5.0 g/dL    Protein Total 6.5 (L) 6.8 - 8.8 g/dL    Alkaline Phosphatase 79 40 - 150 U/L    ALT 9 0 - 50 U/L    AST 14 0 - 45 U/L   Lipase   Result Value Ref Range    Lipase 129 73 - 393 U/L   TSH   Result Value Ref Range    TSH 3.06 0.40 - 4.00 mU/L   Abd/pelvis CT - no contrast - Stone Protocol    Narrative    CT ABDOMEN/PELVIS WITHOUT CONTRAST January 15, 2019 10:21 AM     HISTORY: Abdomen pain and tenderness for several weeks. Generalized  weakness. Broad based work-up, will start with non-contrast CT.    TECHNIQUE: CT of the abdomen and pelvis was performed without  intravenous contrast. Radiation dose for this scan was reduced using  automated exposure control, adjustment of the mA and/or kV according  to patient size, or iterative reconstruction technique.    COMPARISON: None.    FINDINGS: There is concentric transmural bowel wall thickening  involving the left side of the transverse colon near its junction with  the splenic flecture. This narrows the lumen of the colon in this  region. On the coronal images, they are adjacent round nodules which  likely represent enlarged lymph nodes. The largest of these measures  1.7 cm in maximum dimension. There is a tiny amount of free fluid  within the dependent pelvis. No free air. There is an anterior  abdominal wall hernia containing mesenteric fat. There is a moderate  hiatal hernia.    Evaluation of the solid organs in the abdomen is limited due to a lack  of intravenous contrast. No gross abnormalities are seen.      Impression    IMPRESSION: Concentric bowel wall thickening involving the transverse  colon as  above with adjacent lymphadenopathy. Findings would be  worrisome for a colon cancer. There is narrowing of the lumen of the  colon but no evidence for bowel obstruction at this time. Differential  diagnosis could include a focal colitis however, neoplastic etiology  is favored. Further evaluation with a colonoscopy could be performed.    MATILDA LIN MD   Chest XR,  PA & LAT    Narrative    CHEST TWO VIEWS  1/15/2019 10:30 AM     HISTORY: 89-year-old woman with weakness.       Impression    IMPRESSION: Since February 6, 2018, heart size remains upper limit of  normal. Retrocardiac density is again noted which may be a hiatal  hernia. Patient had a CT exam performed at a similar time, confirming  hiatal hernia as opposed to a thoracic aortic aneurysm. No pleural  effusion, pneumothorax, or abnormal area of consolidation. Minimal  linear opacities in the inferolateral left lung likely atelectasis or  scar formation.    COLLEEN MCCLENDON MD   UA with Microscopic   Result Value Ref Range    Color Urine Yellow     Appearance Urine Slightly Cloudy     Glucose Urine Negative NEG^Negative mg/dL    Bilirubin Urine Negative NEG^Negative    Ketones Urine Negative NEG^Negative mg/dL    Specific Gravity Urine 1.014 1.003 - 1.035    Blood Urine Negative NEG^Negative    pH Urine 7.0 5.0 - 7.0 pH    Protein Albumin Urine Negative NEG^Negative mg/dL    Urobilinogen mg/dL 0.0 0.0 - 2.0 mg/dL    Nitrite Urine Negative NEG^Negative    Leukocyte Esterase Urine Large (A) NEG^Negative    Source Unspecified Urine     WBC Urine 28 (H) 0 - 5 /HPF    RBC Urine 1 0 - 2 /HPF    Bacteria Urine Few (A) NEG^Negative /HPF    Squamous Epithelial /HPF Urine 2 (H) 0 - 1 /HPF    Mucous Urine Present (A) NEG^Negative /LPF    Hyaline Casts 4 (H) 0 - 2 /LPF    Amorphous Crystals Few (A) NEG^Negative /HPF   Urine Culture   Result Value Ref Range    Specimen Description Unspecified Urine     Special Requests Specimen received in preservative     Culture  Micro PENDING    Head CT w/o contrast    Narrative    CT SCAN OF THE HEAD WITHOUT CONTRAST   1/15/2019 2:00 PM     HISTORY: Generalized weakness left greater than right.    TECHNIQUE:  Axial images of the head and coronal reformations without  IV contrast material. Radiation dose for this scan was reduced using  automated exposure control, adjustment of the mA and/or kV according  to patient size, or iterative reconstruction technique.    COMPARISON: None.    FINDINGS: There is no evidence of intracranial hemorrhage, mass, acute  infarct or anomaly. There is generalized atrophy of the brain. There  is low attenuation in the white matter of the cerebral hemispheres  consistent with sequelae of small vessel ischemic disease. Ventricular  size is within normal limits without evidence of hydrocephalus.     The visualized portions of the sinuses and mastoids appear normal. The  bony calvarium and bones of the skull base appear intact.       Impression    IMPRESSION:     1. No evidence of acute intracranial hemorrhage, mass, or herniation.  2. There is generalized atrophy of the brain. White matter changes are  present in the cerebral hemispheres that are consistent with small  vessel ischemic disease in this age patient.     CASSI SHEPHERD MD   Palliative Care Adult IP Consult: possible colon cancer; Consultant may enter orders: Yes; Patient to be seen: Routine - within 24 hours    Narrative    Mallory Sen, APRN CNP     1/15/2019  5:29 PM  [facility]    Palliative Care Consultation--Inpatient  Admission Date: 1/15/2019   Visit Date: January 15, 2019  PCP: Alejandra Watson   Requested by: Fabrizio Silva MD      HISTORY of PRESENT ILLNESS:  Lara Wills is a 89 year old year old female who presented to   the ED due to weakness and depressed mood that became more   pronounced over the past several weeks.  She also had c/o   suprapublic tenderness and mild abdominal pain which, upon CT   evaluation, appears to  be associated with a newly discovered   transverse colon changes suggestive of colon cancer.  She was   referred to Palliative Care for exploration of treatment options   and goals of care.      ASSESSMENT & PLAN:    # Chronic constipation, noncompliance with prescribed Miralax   > daily Miralax  > discontinue Oscal for now    # Depression, worse during the winter season;  has a SAD light at   home but again is noncompliant with its use  > son to bring in Bright Light previously prescribed by PCP; will   use it here if it checks out OK by Facilities.    > consider initiation of Bupropion  mg daily    # Advance Care Planning:  > Understanding of condition:  Knows she probably has colon   cancer  > Decisional capacity:  intact  > Code status:  DNR / DNI  > Health Care Directive: Yes, If Yes, is there a copy in the EMR?     Yes  > POLST:  Yes, copy in EMR?:  Yes    # Goals of Care:  > agreeable to Counts include 234 beds at the [facility] upon discharge; PT/OT orders placed  > not interested in colonoscopy, biopsy, any/all cancer   treatments.    > not interested in hospice yet; appears to think that hospice is   just for the last few days, but she wasn't open to hearing more   at this time.  I don't recommend hospice at this point anyhow as   the patient is at risk for readmission due to bowel obstruction   due to persistent noncompliance with use of Miralax despite best   efforts of her primary caregiver, MARVIN Kamara.  Patient reportedly   prefers hard, marble-like stools as it's easier to clean herself   afterward than if/when stools loose/soft.         Thank you for the opportunity to be of service to this patient   and family.      Pio Sen CNP ([place])  Palliative Care  Private cell:  [phone]     Face to face:   1525 - 1625, 60 min, > 50% spent discussing   symptoms, goals of care, medication compliance, self-care   abilities/limitations.   Non face to face:   [id number] and 1625 - 1725, 100 min, > 50%   spent coordinating care  with  attending, nursing and Care   Transitions; and speaking outside pt's room with son Evert and his   wife Anh regarding placement options following TCU.       = = = = = = = = = = = = = = = =      PROBLEM LIST  Patient Active Problem List   Diagnosis     Advance Care Planning     Hyperlipidemia with target LDL less than 130     Chronic constipation     OA (osteoarthritis) of knee     Generalized anxiety disorder     Major depressive disorder, single episode, mild (H)     Benign essential hypertension     Hypothyroidism, unspecified type     Shoulder dislocation, left, initial encounter     Closed supracondylar fracture of left humerus     Fall     Delirium     Hypoxemia     Interstitial fibrosis     Health Care Home     Mild cognitive impairment     Undiagnosed cardiac murmurs     Abdominal pain, generalized       PAST MEDICAL HISTORY  No past medical history on file.    PAST SURGICAL HISTORY  Past Surgical History:   Procedure Laterality Date     APPENDECTOMY  1947     BREAST BIOPSY, RT/LT      right - benign     CATARACT IOL, RT/LT      bilateral     partial thyroidectomy       VITRECTOMY,STRIP EPIRETINAL MEMBRANE  2002       FAMILY MEDICAL HISTORY  Family History   Problem Relation Age of Onset     C.A.D. Mother      Cancer - colorectal Father      Alzheimer Disease Brother      Neurologic Disorder Brother         parkinson     Cancer Daughter         lymphoma       SOCIAL HISTORY  History   Smoking Status     Never Smoker   Smokeless Tobacco     Never Used     Social History    Substance and Sexual Activity      Alcohol use: No    History   Drug Use No     Social History     Social History Narrative    January 15, 2019.  .  Has 1 daughter and 3 sons, two of   whom are in their 70's.  Never smoked.  Lives in an assisted   living apartment on the [facility].  Her DIL Anh visits   daily to manage meds and assist with bathing as needed.  However,   Anh expressed caregiver burnout as she is also  caring for her   own debilitated parents, and expressed her inability to continue   providing cares as she has to Lara--Anh and spouse (pt's   son) Evert are hopeful that Lara will move to [facility] in the   near future.      Pio Sen CNP ([place])    Palliative Care    [facility] cell:  [phone]        SPIRITUAL HISTORY  Faith; discussed Anointing of the Sick, but patient   declined--thinks that should be done when death is imminent.     ALLERGIES  Allergies   Allergen Reactions     Codeine Sulfate Unknown     Penicillins Unknown       MEDICATIONS  Medications Prior to Admission    No current facility-administered medications on file prior to   encounter.   Current Outpatient Medications on File Prior to Encounter:  acetaminophen (TYLENOL) 500 MG tablet Take 1,000 mg by mouth   every 8 hours as needed for mild pain   amLODIPine (NORVASC) 5 MG tablet TAKE 1 TABLET BY MOUTH TWICE   DAILY   brimonidine (ALPHAGAN) 0.2 % ophthalmic solution Place 1 drop   into both eyes 2 times daily    busPIRone (BUSPAR) 10 MG tablet Take 10 mg by mouth 2 times daily   0800, 1700   calcium-vitamin D (CALTRATE) 600-400 MG-UNIT per tablet Take 1   tablet by mouth 2 times daily At 1200, and 8 pm   docusate sodium (COLACE) 100 MG capsule Take 100 mg by mouth 3   times daily 0800, 1200, 2000   DULoxetine (CYMBALTA) 60 MG EC capsule Take 60 mg by mouth daily   latanoprost (XALATAN) 0.005 % ophthalmic solution Place 1 drop   into both eyes At Bedtime   levothyroxine (SYNTHROID/LEVOTHROID) 50 MCG tablet Take 1 tablet   (50 mcg) by mouth daily   losartan (COZAAR) 25 MG tablet TAKE 1 TABLET (25 MG) BY MOUTH 2   TIMES DAILY   metoprolol succinate (TOPROL-XL) 25 MG 24 hr tablet Take 1 tablet   (25 mg) by mouth daily   mirtazapine (REMERON) 15 MG tablet Take 15 mg by mouth At Bedtime     multivitamin  with lutein (OCUVITE WITH LTEIN) CAPS Take 1   capsule by mouth daily At 1200   order for DME Oxygen 2 Li/minat night    polyethylene glycol (MIRALAX/GLYCOLAX) powder Take 1 capful by   mouth daily   VITAMIN D, CHOLECALCIFEROL, PO Take 2,000 Units by mouth daily At   1200       Current Medications    amLODIPine  5 mg Oral BID     brimonidine  1 drop Left Eye BID     busPIRone  10 mg Oral BID     cefTRIAXone  1 g Intravenous Q24H     docusate sodium  100 mg Oral TID     [START ON 1/16/2019] DULoxetine  60 mg Oral Daily     latanoprost  1 drop Both Eyes At Bedtime     [START ON 1/16/2019] levothyroxine  50 mcg Oral QAM AC     losartan  25 mg Oral BID     [START ON 1/16/2019] metoprolol succinate ER  25 mg Oral Daily     mirtazapine  15 mg Oral At Bedtime     multivitamin  1 tablet Oral Daily     polyethylene glycol  17 g Oral Daily       PRN Medications  acetaminophen, HYDROcodone-acetaminophen, HYDROmorphone,   melatonin, naloxone, ondansetron **OR** ondansetron,   prochlorperazine **OR** prochlorperazine **OR** prochlorperazine      REVIEW OF SYSTEMS  Chronic constipation apparently/reportedly by choice--refused   Miralax and prefers hard stools as it is easier to clean herself   afterwards.  External hemorrhoids recently found in investigation   of recent BRBPR/on toilet paper. Little appetite.  No nausea.    Normal weight around 160 lbs; last clinic weight was unchanged   from what it was 6 months ago. Chronic poor sleep, but doesn't   expect to sleep any better than she currently is doing.  Uses O2   at HS for hypoxia, not during the day. Drinks very little   fluid--DIL estimates only 8 oz per day, despite encouragement.    Ambulates within her apartment with a walker, and is minimally   active beyond that; she walks to the Dining Room, but it's not   far from her apartment.  Sits most of the day.  Admits to being   depressed and ready for death, but not for hospice and not for   Last Rites.  Used to enjoy reading, not any more.  No back pain   at present.  Vomited x 1 about 10 days ago, none since.  Most   recent fall was about  "a year ago.  Son Evert states she is always   \"negative.\"  Has a Bright Light, presumably for SAD, but does not   use it.  Son also reports her memory has been \"slipping   significantly\" over the past several months.      Performance Assessment:    Palliative Performance Scale, v.2     50%  Extensive disease. Normal or reduced intake; normal LOC   or confusion; little ambulation (mainly sit/lie), ADLs w/much   assistance, unable to do any work.        PHYSICAL EXAMINATION  Patient Vitals for the past 24 hrs:   BP Temp Temp src Pulse Resp SpO2 Height Weight   01/15/19 1618 133/70 98  F (36.7  C) Oral 78 18 94 % -- --   01/15/19 1431 131/69 98.3  F (36.8  C) Oral 78 18 93 % 1.524 m   (5') 70.8 kg (156 lb 1.4 oz)   01/15/19 1300 (!) 153/97 -- -- 87 -- 94 % -- --   01/15/19 1200 122/71 -- -- 78 -- 91 % -- --   01/15/19 1100 114/63 -- -- 71 -- 92 % -- --   01/15/19 1000 111/66 -- -- 75 -- 95 % -- --   01/15/19 0845 111/71 98  F (36.7  C) Oral 93 18 94 % -- 73 kg (161   lb)      Wt Readings from Last 5 Encounters:   01/15/19 70.8 kg (156 lb 1.4 oz)   12/10/18 72.6 kg (160 lb)   08/16/18 73 kg (161 lb)   03/26/18 73 kg (161 lb)   03/05/18 74.4 kg (164 lb)     Constitutional:  Pleasant; appears stated age  Eyes:  Anicteric, PERRL  HEENT:  OP pink, moist, no thrush  Lymph/Hematologic:  No epitrochlear, axillary, anterior or   posterior cervical, or supraclavicular lymphadenopathy is   appreciated; soft mass palplable left lateral to umbilicus--lymph   node?  Cardiovascular:  RRR w/soft murmur radiating to L axilla  Respiratory:  Clear, respirations unlabored but O2 per nc  GI: Soft, non-tender, normal bowel sounds, no hepatosplenomegaly,   periumbilical palpable mass as noted above  Genitourinary:  deferred  Musculoskeletal:  NITESH x 4  Skin:  Warm, dry, like tissue paper  Neurological:  nonfocal  Psych:  Repeatedly confused her  for her father when   reporting family history of colon cancer.  Unable to recall   timing " of recent symptoms--looked to MARVIN Kamara for those answers      DATA  ROUTINE ICU LABS (Last four results)  CMP  Recent Labs   Lab 01/15/19  0946      POTASSIUM 3.8   CHLORIDE 99   CO2 30   ANIONGAP 6   *   BUN 15   CR 0.77   GFRESTIMATED 65   GFRESTBLACK 76   EVA 8.7   PROTTOTAL 6.5*   ALBUMIN 2.7*   BILITOTAL 0.4   ALKPHOS 79   AST 14   ALT 9     CBC  Recent Labs   Lab 01/15/19  0946   WBC 12.5*   RBC 4.24   HGB 12.4   HCT 38.0   MCV 90   MCH 29.2   MCHC 32.6   RDW 12.3   *     INRNo lab results found in last 7 days.  Arterial Blood GasNo lab results found in last 7 days.      Recent Results (from the past 48 hour(s))   Abd/pelvis CT - no contrast - Stone Protocol    Narrative    CT ABDOMEN/PELVIS WITHOUT CONTRAST January 15, 2019 10:21 AM     HISTORY: Abdomen pain and tenderness for several weeks.   Generalized  weakness. Broad based work-up, will start with non-contrast CT.    TECHNIQUE: CT of the abdomen and pelvis was performed without  intravenous contrast. Radiation dose for this scan was reduced   using  automated exposure control, adjustment of the mA and/or kV   according  to patient size, or iterative reconstruction technique.    COMPARISON: None.    FINDINGS: There is concentric transmural bowel wall thickening  involving the left side of the transverse colon near its junction   with  the splenic flecture. This narrows the lumen of the colon in this  region. On the coronal images, they are adjacent round nodules   which  likely represent enlarged lymph nodes. The largest of these   measures  1.7 cm in maximum dimension. There is a tiny amount of free fluid  within the dependent pelvis. No free air. There is an anterior  abdominal wall hernia containing mesenteric fat. There is a   moderate  hiatal hernia.    Evaluation of the solid organs in the abdomen is limited due to a   lack  of intravenous contrast. No gross abnormalities are seen.      Impression    IMPRESSION: Concentric bowel  wall thickening involving the   transverse  colon as above with adjacent lymphadenopathy. Findings would be  worrisome for a colon cancer. There is narrowing of the lumen of   the  colon but no evidence for bowel obstruction at this time.   Differential  diagnosis could include a focal colitis however, neoplastic   etiology  is favored. Further evaluation with a colonoscopy could be   performed.    MATILDA LIN MD   Chest XR,  PA & LAT    Narrative    CHEST TWO VIEWS  1/15/2019 10:30 AM     HISTORY: 89-year-old woman with weakness.       Impression    IMPRESSION: Since February 6, 2018, heart size remains upper   limit of  normal. Retrocardiac density is again noted which may be a hiatal  hernia. Patient had a CT exam performed at a similar time,   confirming  hiatal hernia as opposed to a thoracic aortic aneurysm. No   pleural  effusion, pneumothorax, or abnormal area of consolidation.   Minimal  linear opacities in the inferolateral left lung likely   atelectasis or  scar formation.    COLLEEN MCCLENDON MD   Head CT w/o contrast    Narrative    CT SCAN OF THE HEAD WITHOUT CONTRAST   1/15/2019 2:00 PM     HISTORY: Generalized weakness left greater than right.    TECHNIQUE:  Axial images of the head and coronal reformations   without  IV contrast material. Radiation dose for this scan was reduced   using  automated exposure control, adjustment of the mA and/or kV   according  to patient size, or iterative reconstruction technique.    COMPARISON: None.    FINDINGS: There is no evidence of intracranial hemorrhage, mass,   acute  infarct or anomaly. There is generalized atrophy of the brain.   There  is low attenuation in the white matter of the cerebral   hemispheres  consistent with sequelae of small vessel ischemic disease.   Ventricular  size is within normal limits without evidence of hydrocephalus.     The visualized portions of the sinuses and mastoids appear   normal. The  bony calvarium and bones of the  skull base appear intact.       Impression    IMPRESSION:     1. No evidence of acute intracranial hemorrhage, mass, or   herniation.  2. There is generalized atrophy of the brain. White matter   changes are  present in the cerebral hemispheres that are consistent with   small  vessel ischemic disease in this age patient.     CASSI SHEPHERD MD            Medications - No data to display    Assessments & Plan (with Medical Decision Making)     MDM: Lara Wills is a 89 year old female who presented with generalized weakness developing over the last 3 weeks.  No obvious worsening of her chronic dyspnea and no significant increase in hypoxia.  Diminished ability to perform ADLs.  Also with history of hypothyroidism and borderline TSH in the fall.  Her vital signs were reassuring and she was afebrile.     Differential diagnosis was broad including history of recurrent similar episodes of dysphoria during the wintertime and a prior TCU admission in 2017 for similar presentation.  However it a 89-year-old, metabolic, infectious disease, cardiopulmonary, neurologic, endocrine syndromes could all contribute and her evaluation here was broad.    A broad-based evaluation of generalized weakness was pursued including with EKG, blood count, chest x-ray, repeat chemistry panelTSH, and urinalysis.  Due to a possible asymmetric weakness affecting the left side more than the right prompted a CT which demonstrated no new lesions.  Urinalysis was delayed but ultimately did demonstrate a UTI for which she was treated.  This result did not come back until she was transferred to the hospital waite.  Other testing was reassuring.     Her abdominal exam revealed a tenderness on the left side and she did note on and off abdominal pain during this period.  There were no obvious peritoneal signs.   a noncontrast CT of the abdomen and pelvis was performed while performing the remainder of the evaluation, and did reveal a likely incidental  mass that may represent a colon cancer in the left upper quadrant this result was discussed with the patient and her daughter.. She does not wish to pursue evaluation of it after discussion with hospitalist team but general surgeon consultation was offered for colonoscopy and biopsy.    She will be admitted to the hospitalist for concern per daughter that she is unsafe to remain at home currently given her current status.  Consider occupational therapy physical therapy consults and evaluation for possible TCU discharge.  In the meantime we will treat UTI and palliative care consultation was placed by hospitalist team.    I have reviewed the nursing notes.    I have reviewed the findings, diagnosis, plan and need for follow up with the patient.          Medication List      There are no discharge medications for this visit.         Final diagnoses:   Generalized muscle weakness   Lesion of colon   Abdominal pain, generalized   Delirium   Mild cognitive impairment   Urinary tract infection without hematuria, site unspecified       1/15/2019   [facility] EMERGENCY DEPARTMENT     Elie Landry MD  01/15/19 2021

## 2019-01-15 NOTE — ED NOTES
Over past 3 weeks increased general weakness. Lives in assisted living. Daughter in law comes in on daily basis. Over past weeks decreased appetite. Usually walks with walker. With walker gets up to BR on own. Feeds and dresses self. Denies pain. On exam has noted slight abd tenderness. States she has had some constipation. Started miralax. This has helped soften stools. Denies urinary sx. Denies URI or cough.

## 2019-01-15 NOTE — ED NOTES
Patient has  Brick to Observation  order. Patient has been given Patient Bill of Rights, Observation brochure and  What does Observation mean to me  forms.  Patient has been given the opportunity to ask questions about observation status and their plan of care.  Paola Steve

## 2019-01-16 NOTE — PLAN OF CARE
Patient continues to be A & O X 4, up with STAND BY ASSIST X1 with walker to BATHROOM. Denies pain, OXYGEN @ 2L NC.

## 2019-01-16 NOTE — PROGRESS NOTES
" 01/16/19 0800   Quick Adds   Type of Visit Initial Occupational Therapy Evaluation   Living Environment   Lives With alone   Living Arrangements assisted living   Living Environment Comment cleaning services and daughter does laundry. Daughter in law assists with showers.    Self-Care   Usual Activity Tolerance fair   Current Activity Tolerance poor   Equipment Currently Used at Home grab bar, toilet;grab bar, tub/shower;shower chair;walker, rolling   Activity/Exercise/Self-Care Comment uses O2 at night (unsure of how much).    Functional Level   Ambulation 1-->assistive equipment  (RW)   Transferring 1-->assistive equipment   Toileting 1-->assistive equipment   Bathing 3-->assistive equipment and person   Dressing 0-->independent   Eating 0-->independent   Communication 0-->understands/communicates without difficulty   Swallowing 0-->swallows foods/liquids without difficulty   Cognition 0 - no cognition issues reported   Fall history within last six months yes   Number of times patient has fallen within last six months 1  (Pt states this may have been \"a little more than 6months ago)   Which of the above functional risks had a recent onset or change? ambulation;transferring;toileting;bathing;dressing   General Information   Onset of Illness/Injury or Date of Surgery - Date 01/15/19   Referring Physician Mallory Sen, APRN CNP   Patient/Family Goals Statement \"I feel so weak\" open to TCU   Additional Occupational Profile Info/Pertinent History of Current Problem Lara Wills is a 89 year old female admitted on 1/15/2019. She presents with abdominal pain and weakness. possible colon cancer.    Precautions/Limitations fall precautions   Cognitive Status Examination   Orientation person  (oriented to month. states \"18\" for year. )   Level of Consciousness alert   Follows Commands (Cognition) WNL   Pain Assessment   Patient Currently in Pain No   Range of Motion (ROM)   ROM Comment slight limited shoulder FF. pt " states hx of L shoulder injury. States ROM has been getting better.    Strength   Strength Comments B UE strength: grossly 4-/5   Hand Strength   Hand Strength Comments B  strength; WFL for ADLs.    Mobility   Bed Mobility Comments SBA for supine to sit with use of bed rail and HOB slightly elevated. Increased effort and observed fatigue following transfer.    Transfer Skill: Bed to Chair/Chair to Bed   Level of McPherson: Bed to Chair contact guard   Physical Assist/Nonphysical Assist: Bed to Chair 1 person assist   Assistive Device - Transfer Skill Bed to Chair Chair to Bed Rehab Eval standard walker   Transfer Skill: Sit to Stand   Level of Mc[place]herson: Sit/Stand contact guard   Physical Assist/Nonphysical Assist: Sit/Stand 1 person assist   Assistive Device for Transfer: Sit/Stand standard walker   Transfer Skill: Toilet Transfer   Level of Mc[place]herson: Toilet contact guard   Physical Assist/Nonphysical Assist: Toilet 1 person assist   Assistive Device standard walker   Upper Body Dressing   Level of [place]herson: Dress Upper Body stand-by assist   Physical Assist/Nonphysical Assist: Dress Upper Body 1 person assist   Lower Body Dressing   Level of McPherson: Dress Lower Body moderate assist (50% patients effort)   Physical Assist/Nonphysical Assist: Dress Lower Body 1 person assist   Instrumental Activities of Daily Living (IADL)   IADL Comments daughter assists with medication set-up, facility provides meals - pt walks to dining room. Has cleaning services and daughter assists with laundry.    Activities of Daily Living Analysis   Impairments Contributing to Impaired Activities of Daily Living balance impaired;ROM decreased;strength decreased   General Therapy Interventions   Planned Therapy Interventions ADL retraining;strengthening;progressive activity/exercise   Clinical Impression   Criteria for Skilled Therapeutic Interventions Met yes, treatment indicated   OT Diagnosis decreased independence  "with ADLs.    Influenced by the following impairments weakness d/t illness   Assessment of Occupational Performance 3-5 Performance Deficits   Identified Performance Deficits showering, dressing, grooming/hygiene, functional mobility   Clinical Decision Making (Complexity) Low complexity   Therapy Frequency daily   Predicted Duration of Therapy Intervention (days/wks) 3-4 days   Anticipated Equipment Needs at Discharge (TBD at TCU)   Anticipated Discharge Disposition Transitional Care Facility   Risks and Benefits of Treatment have been explained. Yes   Patient, Family & other staff in agreement with plan of care Yes   [facility] AM-PAC  \"6 Clicks\" Daily Activity Inpatient Short Form   1. Putting on and taking off regular lower body clothing? 2 - A Lot   2. Bathing (including washing, rinsing, drying)? 2 - A Lot   3. Toileting, which includes using toilet, bedpan or urinal? 3 - A Little   4. Putting on and taking off regular upper body clothing? 3 - A Little   5. Taking care of personal grooming such as brushing teeth? 3 - A Little   6. Eating meals? 4 - None   Daily Activity Raw Score (Score out of 24.Lower scores equate to lower levels of function) 17   Total Evaluation Time   Total Evaluation Time (Minutes) 7     "

## 2019-01-16 NOTE — PLAN OF CARE
Discharge Planner OT   Patient plan for discharge: Family hopeful for TCU    Current status: Eval complete. SBA for supine to sit- fatigues with task. CGA for sit to stand. Completes 2 bouts of standing x1 min each with seated rest break in between. Wants to return to bed d/t fatigue.     Barriers to return to prior living situation: fatigue/weakness, lives alone, below functional baseline    Recommendations for discharge: TCU    Rationale for recommendations: Dependent on pt's goals pt would benefit from TCU to increase independence with ADLs with transition into appropriate living environment (nursing home with increased services vs. LTC)       Entered by: Roxana Mccormick 01/16/2019 10:12 AM

## 2019-01-16 NOTE — PROGRESS NOTES
Patient c/o feeling very weak this morning. Able to sit up in chair, ate sm amount for brkfst. B/P 126/69.Requests to go back to bed after brkfst, no other c/o at this time.

## 2019-01-16 NOTE — PROGRESS NOTES
Discharge Planner PT   Patient plan for discharge: TCU  Current status: Transfers with SBA with effort to get legs into bed. Amb with RW 20' with CGA on 2L O2/min.  Amb with shoes on for better stability per DIL. Performed LE exs x 10.    Barriers to return to prior living situation: decreased corey to activity  Recommendations for discharge: TCU  Rationale for recommendations: decreased corey to activity, weakness       Entered by: Heidi Barrett 01/16/2019 11:29 AM     Physical Therapy Evaluation        01/16/19 1100   Quick Adds   Type of Visit Initial PT Evaluation   Living Environment   Lives With alone   Living Arrangements assisted living   Home Accessibility no concerns   Functional Level Prior   Ambulation 1-->assistive equipment   Transferring 0-->independent   Toileting 0-->independent   Bathing 3-->assistive equipment and person   Communication 0-->understands/communicates without difficulty   Swallowing 0-->swallows foods/liquids without difficulty   Cognition 0 - no cognition issues reported   General Information   Onset of Illness/Injury or Date of Surgery - Date 01/15/19   Referring Physician Mallory Sen, APRN CNP   Patient/Family Goals Statement wants to go to a TCU to get stronger before returning to AL   Pertinent History of Current Problem (include personal factors and/or comorbidities that impact the POC) Lara Wills is a 89 year old female admitted on 1/15/2019. She presents with abdominal pain and weakness.   Cognitive Status Examination   Orientation orientation to person, place and time   Level of Consciousness alert   Follows Commands and Answers Questions 100% of the time   Personal Safety and Judgment intact   Posture    Posture Forward head position;Protracted shoulders;Kyphosis   Range of Motion (ROM)   ROM Comment WFL for age   Strength   Strength Comments generally 4 to 4+/5 throughout   Gait   Gait Comments Transfers with SBA with effort to get legs into bed. Amb with RW 20' with  "CGA on 2L O2/min.  Amb with shoes on for better stability per DIL.   Balance   Balance Comments good with RW   General Therapy Interventions   Planned Therapy Interventions strengthening;gait training;bed mobility training   Clinical Impression   Criteria for Skilled Therapeutic Intervention yes, treatment indicated   PT Diagnosis weakness   Influenced by the following impairments weakness   Functional limitations due to impairments gait endurance, bed mobility   Clinical Presentation Stable/Uncomplicated   Clinical Presentation Rationale clinical judgement   Clinical Decision Making (Complexity) Low complexity   Therapy Frequency` daily   Predicted Duration of Therapy Intervention (days/wks) 3 days   Anticipated Discharge Disposition Transitional Care Facility   Risk & Benefits of therapy have been explained Yes   Patient, Family & other staff in agreement with plan of care Yes   [facility] TM \"6 Clicks\"   2016, Trustees of [facility], under license to [employer].  All rights reserved.   6 Clicks Short Forms Basic Mobility Inpatient Short Form   [facility]  \"6 Clicks\" V.2 Basic Mobility Inpatient Short Form   1. Turning from your back to your side while in a flat bed without using bedrails? 3 - A Little   2. Moving from lying on your back to sitting on the side of a flat bed without using bedrails? 4 - None   3. Moving to and from a bed to a chair (including a wheelchair)? 3 - A Little   4. Standing up from a chair using your arms (e.g., wheelchair, or bedside chair)? 4 - None   5. To walk in hospital room? 3 - A Little   6. Climbing 3-5 steps with a railing? 2 - A Lot   Basic Mobility Raw Score (Score out of 24.Lower scores equate to lower levels of function) 19   Total Evaluation Time   Total Evaluation Time (Minutes) 15     See Care Plan for Goals.    Heidi Barrett PT      "

## 2019-01-16 NOTE — PROGRESS NOTES
Discharge Planner   Discharge Plans in progress: TCU (ref pend)  Barriers to discharge plan: Medical stability  Follow up plan: PCP       Entered by: Renetta León 01/16/2019 2:00 PM

## 2019-01-16 NOTE — PLAN OF CARE
Patient A/O x4. O2 on at 2 LPM; states she uses oxygen at home only at HOS. Up to the bathroom with one assist and walker x2 by writer. Does not have any appetite; encouraged eating. Family states pt is very picky. VSS.

## 2019-01-16 NOTE — CONSULTS
Care Transition Initial Assessment - RN      Met with: Patient and Family.    DATA   Principal Problem:    Abdominal pain, generalized  Active Problems:    Generalized anxiety disorder    Major depressive disorder, single episode, mild (H)    Benign essential hypertension    Hypothyroidism, unspecified type       Primary Care Clinic Name: [facility]  Primary Care MD Name: Alejandra Watson MD  Contact information and PCP information verified: Yes    ASSESSMENT  Cognitive Status: awake and alert.       Resources List: Skilled Nursing Facility, Transitional Care              Description of Support System: Supportive, Involved   Who is your support system?: Children   Support Assessment: Lacks adequate physical care   Insurance Concerns: No Insurance issues identified    This writer met with pt and her daughter in law (MARVIN) Sierra, introduced self and role. Care Transitions is consulted for discharge planning. This writer discussed discharge planning and Medicare guidelines in regards to TCU. Pt lives at [facility] (phone: [phone] Fax: [phone]) in the community. Pt has [facility] services at home and she does have family support, patient in agreement with this writer discussing patients discharge options with family present. Patient is in agreement and has a goal of going to a TCU upon discharge.  Patient was provided with Medicare certified nursing home list. Pts choices are as follows [facility] (Main: [phone] Admissions: [phone] Fax: [phone]), [address] [facility] (Phone: [phone] Fax: [phone]) and [facility] (Main Phone:[phone] Admissions Phone:[phone] Fax: [phone]). TCU referrals pending.  Senior linkage resources provided. This writer discussed with the patient the private pay costs for transportation, patient aware. Transportation provided by patients family.  Patient uses a walker at baseline at her  [facility].  PLAN  TCU (ref pend)      Renetta León RN Care Coordinator  [facility] [phone]  [facility] [phone]

## 2019-01-17 NOTE — PLAN OF CARE
Patient alert and orientated, but does get confused at times. This evening patient asked when lights out is and stated she is new here and doesn't know how everything works. Patient on 2L of oxygen via nasal cannula when in bed resting to maintain O2 sats. Patient on room air while awake. Other vital signs stable. Afebrile. Patient up with stand by assist with walker.      Patient reported feeling bloated, constipated, and said she had abdominal discomfort. Scheduled bowel meds and suppository given. Patient had a small formed BM, but did not report much relief from feeling bloated. Patient declined pain medication when offered, but later requested tylenol for a headache this afternoon. Patient also has a poor appetite and says sitting up hurts her abdomen more. Patient not eating much of her meals, and per family this has been occurring at home as well.

## 2019-01-17 NOTE — PROGRESS NOTES
"Patient is having increased back pain. Tylenol given earlier with short term relief. Patient concerned to take norco a she \"had a bad reaction to narcotics last time\". 1/2 tab given with little relief then other 1/2 of tablet given. Discussed previous narcotic was dilaudid. She remains awake and alert. She and family agree with current plan.  "

## 2019-01-17 NOTE — PROGRESS NOTES
Care Transitions Progress note    Reason for Follow up: Patients daughter Anh had concerns that the patient may not participate with therapy. This writer updated the Admissions team at [facility] for potential need for LTC placement, with or without a TCU stay. Anh is in agreement with attempting to complete PT again tomorrow and then re-assessing the patients need for a TCU for after her hospital stay.    Anticipated DC Needs: TCU (ref pend)     Next Steps: TCU VS LTC    Renetta León RN Care Coordinator  [facility] [phone]  [facility] [place] [phone]

## 2019-01-17 NOTE — PLAN OF CARE
Pt: Attempted to see patient in AM, patient refused PT services. Pt reports that she doesn't want to do PT today. Will attempt to see again tomorrow.      Please Contact me with any questions or concerns. Thank you for for patience and cooperation.     Amaury Colin PT, DPT  Flexible Workforce Physical Therapist   [facility]  [email or web address]

## 2019-01-17 NOTE — PLAN OF CARE
Patient denies pain. Last norco given at approximately 1830. Up with assist x 1 and walker. On 2 L O2. Lungs clear. /72 (BP Location: Right arm)   Pulse 90   Temp 98.1  F (36.7  C) (Oral)   Resp 18   Ht 1.524 m (5')   Wt 71.3 kg (157 lb 3 oz)   SpO2 90%   BMI 30.70 kg/m

## 2019-01-17 NOTE — PLAN OF CARE
Pt alert and oriented, using call light and verbalizing needs approprietly. Denies pain. Is reporting some constipation guesses it has been 3 days since LBM. Writer gave daily miralax with some juice pt had small results. Pt still feeling uncomfortable and slightly nauseated. Up in room with minimal assist of 1 and walker. Did verbalize some fears/sadness about probable colon cancer dx.

## 2019-01-17 NOTE — PROGRESS NOTES
[facility] Hospitalist Service      Subjective:  Very weak  Some anterior abd pain  Back pain last night-received hydrocodone  Family feels she is unable to discharge  No bm for three days  Anorexic  No uti symptoms    Review of Systems:  CONSTITUTIONAL:weak, anorexic  INTEGUMENTARY/SKIN: NEGATIVE for worrisome rashes, moles or lesions  EYES: NEGATIVE for vision changes or irritation  ENT/MOUTH: NEGATIVE for ear, mouth and throat problems  RESP: NEGATIVE for significant cough or SOB  BREAST: NEGATIVE for masses, tenderness or discharge  CV: NEGATIVE for chest pain, palpitations or peripheral edema  GI: above  : NEGATIVE for frequency, dysuria, or hematuria  MUSCULOSKELETAL:back pain  NEURO: NEGATIVE for weakness, dizziness or paresthesias  ENDOCRINE: NEGATIVE for temperature intolerance, skin/hair changes  HEME: NEGATIVE for bleeding problems  PSYCHIATRIC: NEGATIVE for changes in mood or affect    Physical Exam:  Vitals Were Reviewed    Patient Vitals for the past 16 hrs:   BP Temp Temp src Pulse Resp SpO2 Weight   01/17/19 0830 129/66 -- -- 97 -- -- --   01/17/19 0818 -- -- -- -- -- 91 % --   01/17/19 0609 -- -- -- -- -- -- 72 kg (158 lb 11.7 oz)   01/17/19 0011 140/65 98.1  F (36.7  C) Oral 91 18 90 % --         Intake/Output Summary (Last 24 hours) at 1/17/2019 0858  Last data filed at 1/17/2019 0230  Gross per 24 hour   Intake 1340 ml   Output --   Net 1340 ml       GENERAL APPEARANCE: weak  EYES: conjunctiva clear, eyes grossly normal  RESP: lungs clear to auscultation - no rales, rhonchi or wheezes  CV: regular rate and rhythm, loud murmur  ABDOMEN: tender mid to upper abd, no rebound, some bs  MS: no clubbing, cyanosis; no edema  SKIN: clear without significant rashes or lesions    Lab:  Recent Labs   Lab Test 01/17/19  0648 01/16/19  0549    136   POTASSIUM 3.8 3.8   CHLORIDE 104 102   CO2 27 27   ANIONGAP 8 7   * 93   BUN 11 12   CR 0.58 0.63   EVA 7.7* 8.3*     CBC RESULTS:   Recent Labs    Lab Test 01/17/19  0648 01/16/19  0549   WBC 10.7 10.9   RBC 4.19 3.85   HGB 12.1 11.3*   HCT 38.9 33.8*   * 500*       Results for orders placed or performed during the hospital encounter of 01/15/19 (from the past 24 hour(s))   Care Transition RN/SW IP Consult    Narrative    Renetta León RN     1/16/2019  2:08 PM  Care Transition Initial Assessment - RN      Met with: Patient and Family.    DATA   Principal Problem:    Abdominal pain, generalized  Active Problems:    Generalized anxiety disorder    Major depressive disorder, single episode, mild (H)    Benign essential hypertension    Hypothyroidism, unspecified type       Primary Care Clinic Name: [facility]  Primary Care MD Name: Alejandra Watson MD  Contact information and PCP information verified: Yes    ASSESSMENTCognitive Status: awake and alert.       Resources List: Skilled Nursing Facility, Transitional Care              Description of Support System: Supportive, Involved   Who is your support system?: Children   Support Assessment: Lacks adequate physical care   Insurance Concerns: No Insurance issues identified    This writer met with pt and her daughter in law (MARVIN) Sierra,   introduced self and role. Care Transitions is consulted for   discharge planning. This writer discussed discharge planning and   Medicare guidelines in regards to TCU. Pt lives at [facility] (phone: [phone] Fax: [phone]) in the   community. Pt has [facility] services at home and she does have family   support, patient in agreement with this writer discussing   patients discharge options with family present. Patient is in   agreement and has a goal of going to a TCU upon discharge.    Patient was provided with Medicare certified nursing home list.   Pts choices are as follows [facility] (Main: [phone]   Admissions: [phone] Fax: [phone]), [facility] (Phone: [phone] Fax: [phone]) and  [facility] Phone (Main Phone:[phone] Admissions   Phone:[phone] Fax: [phone]). TCU referrals pending.    Senior linkage resources provided. This writer discussed with the   patient the private pay costs for transportation, patient aware.   Transportation provided by patients family.  Patient uses a   walker [facility].  PLAN  TCU (ref pend)      Renetta León RN Care Coordinator  [facility] [phone]  [facility] [phone]   Basic metabolic panel   Result Value Ref Range    Sodium 139 133 - 144 mmol/L    Potassium 3.8 3.4 - 5.3 mmol/L    Chloride 104 94 - 109 mmol/L    Carbon Dioxide 27 20 - 32 mmol/L    Anion Gap 8 3 - 14 mmol/L    Glucose 111 (H) 70 - 99 mg/dL    Urea Nitrogen 11 7 - 30 mg/dL    Creatinine 0.58 0.52 - 1.04 mg/dL    GFR Estimate 77 >60 mL/min/[1.73_m2]    GFR Estimate If Black 90 >60 mL/min/[1.73_m2]    Calcium 7.7 (L) 8.5 - 10.1 mg/dL   CBC with platelets   Result Value Ref Range    WBC 10.7 4.0 - 11.0 10e9/L    RBC Count 4.19 3.8 - 5.2 10e12/L    Hemoglobin 12.1 11.7 - 15.7 g/dL    Hematocrit 38.9 35.0 - 47.0 %    MCV 93 78 - 100 fl    MCH 28.9 26.5 - 33.0 pg    MCHC 31.1 (L) 31.5 - 36.5 g/dL    RDW 12.3 10.0 - 15.0 %    Platelet Count 460 (H) 150 - 450 10e9/L       Assessment and Plan:    Lara Wills is a 89 year old female admitted on 1/15/2019. She presents with abdominal pain and weakness.     Abdominal pain, generalized  Abnormal transverse colon on CT-possible colon cancer  Generalized abdominal fullness and discomfort with tenderness in the RUQ.  Decreased appetite.  One episode of emesis about a week ago.  Chronically hard stools with occasional bouts of constipation.  No fevers.  No black stools.  Recent outpatient evaluation for bloody stools, diagnosed with external hemorrhoids.  CT shows concentric bowel wall thickening involving the transverse colon with adjacent lymphadenopathy, worrying for colon cancer.  At this  time she is not interested in pursuing colonoscopy.    Currently anorexic      Generalized weakness/FTT/anorexia  Approximately 1 month of feeling fatigued and weak.  Noticeably less active, not doing the things she used to enjoy including reading books.  This is likely multifactorial, could certainly be a result of malignancy with some component of seasonal depression, also acknowledges decreased oral intake.  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  Her daughter-in-law is concerned that she is no longer taking good care of herself in the assisted living environment and would like to see her placed in a TCU.        Urinary tract infection-ruled out  Neg culture, rocephin stopped     Generalized anxiety disorder  Major depressive disorder  Chronic, with a seasonal pattern.  Per daughter-in-law, noticeably worse since the beginning of December.  Does not seem any worse than previous years.  This is likely concluding factor in her generalized weakness and fatigue.  - Continue home Buspar, Remeron, and Cymbalta.  - May require assistance identifying a drug to replace BuSpar, as she is been told she will no longer be able to fill this medication due to national shortages.     Benign essential hypertension  Chronic, appears well-managed. Reviewed outpatient BPs.  - Continue home metoprolol and losartan.     Hypothyroidism  TSH 3.06.  - Continue home levothyroxine 50 mcg           Diet: adat  DVT Prophylaxis: Pneumatic Compression Devices  Garcia Catheter: not present  Code Status: DNR/DNI  FEN- saline at 50     plan -the patients symptoms of weakness, anorexia and fatigue are possibly multifactorial (age, possible colon cancer).  Stopping rocephin. She doesn't want colonoscopy  Will need tcu or ltc. Currently constipated-try ducolax, add senna. Will likely go to tcu tomorrow.

## 2019-01-17 NOTE — PLAN OF CARE
"OT: Upon attempt pt declines all activity. States feeling \"very weak\". Talks to therapist about colon cancer states \"I'm hoping I'm not going to last long. I've had a good life\". Will attempt tomorrow as appropriate.   "

## 2019-01-18 NOTE — PROGRESS NOTES
Name: Lara Wills    MRN#: [id number]    Reason for Hospitalization: Generalized muscle weakness [M62.81]  Lesion of colon [K63.9]    Discharge Date: 1/18/2019    Patient / Family response to discharge plan: Pt has been accepted at [facility] (Main: [phone] Admissions: [phone] Fax: [phone]). Pt will discharge there today, pt will start on Medicare benefit and will most likely transition to LTC. Anh Gong in agreement with this.     Other Providers (Care Coordinator, County Services, PCA services etc): No    CTS Hand Off Completed: Yes: completed    PAS #: requested    ABHAY Score: average    Future Appointments: No future appointments.    Discharge Disposition: transitional care unit    Discharge Planner   Discharge Plans in progress: [facility]  Barriers to discharge plan: none  Follow up plan: SNF       Entered by: Tracy Beckman 01/18/2019 11:30 AM           Tracy Beckman MSISABELLA, LICSW, [facility] [phone]

## 2019-01-18 NOTE — PLAN OF CARE
Patient alert, confused at times. SBA with walker. Removed oxygen overnight, agreed to have put back on with encouragement. Prn norco given for abdominal discomfort. Does not use call light. NaCl running at 50 ml/hr. Denies nausea, SOB. /75 (BP Location: Right arm)   Pulse 92   Temp 97.5  F (36.4  C) (Oral)   Resp 16   Ht 1.524 m (5')   Wt 72.3 kg (159 lb 6.3 oz)   SpO2 (!) 88%   BMI 31.13 kg/m

## 2019-01-18 NOTE — PLAN OF CARE
[place] [facility] DISCHARGE NOTE    Patient discharged to transitional care unit at 1:47 PM via wheel chair. Accompanied by daughter and staff. Discharge instructions reviewed with sibling and caregiver, opportunity offered to ask questions. Prescriptions sent with patient to fill . All belongings sent with patient.    Sarina Caruso

## 2019-01-18 NOTE — PROGRESS NOTES
[facility] Hospitalist Service      Subjective:  No complaints  No fever  Took norco overnight  Some intermittent confusion    Review of Systems:  CONSTITUTIONAL: NEGATIVE for fever, chills, change in weight  ENT/MOUTH: NEGATIVE for ear, mouth and throat problems  RESP: NEGATIVE for significant cough or SOB  BREAST: NEGATIVE for masses, tenderness or discharge  CV: NEGATIVE for chest pain, palpitations or peripheral edema  GI: NEGATIVE for nausea, abdominal pain, heartburn, or change in bowel habits  : NEGATIVE for frequency, dysuria, or hematuria  MUSCULOSKELETAL: NEGATIVE for significant arthralgias or myalgia  NEURO: NEGATIVE for weakness, dizziness or paresthesias    Physical Exam:  Vitals Were Reviewed    Patient Vitals for the past 16 hrs:   BP Temp Temp src Pulse Resp SpO2 Weight   01/18/19 0714 -- -- -- -- -- (!) 89 % --   01/18/19 0710 149/76 98.6  F (37  C) Oral 102 16 (!) 87 % --   01/18/19 0525 -- -- -- -- -- -- 72.3 kg (159 lb 6.3 oz)   01/17/19 2301 149/75 97.5  F (36.4  C) Oral 92 16 (!) 88 % --   01/17/19 2016 138/76 -- -- -- -- -- --         Intake/Output Summary (Last 24 hours) at 1/18/2019 0813  Last data filed at 1/17/2019 1723  Gross per 24 hour   Intake 1246 ml   Output --   Net 1246 ml       GENERAL APPEARANCE: weak  EYES: conjunctiva clear, eyes grossly normal  RESP: lungs clear to auscultation - no rales, rhonchi or wheezes  CV: regular rate and rhythm, normal S1 S2, no S3 or S4 and no murmur, click or rub   ABDOMEN: soft , some epigastric tenderness  MS: no clubbing, cyanosis; no edema  SKIN: clear without significant rashes or lesions    Lab:  Recent Labs   Lab Test 01/18/19  0553 01/17/19  0648    139   POTASSIUM 3.8 3.8   CHLORIDE 107 104   CO2 27 27   ANIONGAP 7 8   GLC 98 111*   BUN 9 11   CR 0.61 0.58   EVA 7.8* 7.7*     CBC RESULTS:   Recent Labs   Lab Test 01/18/19  0553 01/17/19  0648   WBC 12.0* 10.7   RBC 4.13 4.19   HGB 12.0 12.1   HCT 37.7 38.9   * 460*        Results for orders placed or performed during the hospital encounter of 01/15/19 (from the past 24 hour(s))   Basic metabolic panel   Result Value Ref Range    Sodium 141 133 - 144 mmol/L    Potassium 3.8 3.4 - 5.3 mmol/L    Chloride 107 94 - 109 mmol/L    Carbon Dioxide 27 20 - 32 mmol/L    Anion Gap 7 3 - 14 mmol/L    Glucose 98 70 - 99 mg/dL    Urea Nitrogen 9 7 - 30 mg/dL    Creatinine 0.61 0.52 - 1.04 mg/dL    GFR Estimate 76 >60 mL/min/[1.73_m2]    GFR Estimate If Black 88 >60 mL/min/[1.73_m2]    Calcium 7.8 (L) 8.5 - 10.1 mg/dL   CBC with platelets   Result Value Ref Range    WBC 12.0 (H) 4.0 - 11.0 10e9/L    RBC Count 4.13 3.8 - 5.2 10e12/L    Hemoglobin 12.0 11.7 - 15.7 g/dL    Hematocrit 37.7 35.0 - 47.0 %    MCV 91 78 - 100 fl    MCH 29.1 26.5 - 33.0 pg    MCHC 31.8 31.5 - 36.5 g/dL    RDW 12.4 10.0 - 15.0 %    Platelet Count 502 (H) 150 - 450 10e9/L       Assessment and Plan:    Lara Wills is a 89 year old female admitted on 1/15/2019. She presents with abdominal pain and weakness.     Abdominal pain, generalized  Abnormal transverse colon on CT-possible colon cancer  Generalized abdominal fullness and discomfort with tenderness in the RUQ.  Decreased appetite.  One episode of emesis about a week ago.  Chronically hard stools with occasional bouts of constipation.  No fevers.  No black stools.  Recent outpatient evaluation for bloody stools, diagnosed with external hemorrhoids.  CT shows concentric bowel wall thickening involving the transverse colon with adjacent lymphadenopathy, worrying for colon cancer.  At this time she is not interested in pursuing colonoscopy.     Currently anorexic      Generalized weakness/FTT/anorexia  Approximately 1 month of feeling fatigued and weak.  Noticeably less active, not doing the things she used to enjoy including reading books.  This is likely multifactorial, could certainly be a result of malignancy with some component of seasonal depression, also  acknowledges decreased oral intake.  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  Her daughter-in-law is concerned that she is no longer taking good care of herself in the assisted living environment and would like to see her placed in a TCU.        Urinary tract infection-ruled out  Neg culture, rocephin stopped     Generalized anxiety disorder  Major depressive disorder  Chronic, with a seasonal pattern.  Per daughter-in-law, noticeably worse since the beginning of December.  Does not seem any worse than previous years.  This is likely concluding factor in her generalized weakness and fatigue.  - Continue home Buspar, Remeron, and Cymbalta.  - May require assistance identifying a drug to replace BuSpar, as she is been told she will no longer be able to fill this medication due to national shortages.    Interstitial lung ds/chronic hypoxic resp failure     Benign essential hypertension  Chronic, appears well-managed. Reviewed outpatient BPs.  - Continue home metoprolol and losartan.     Hypothyroidism  TSH 3.06.  - Continue home levothyroxine 50 mcg           Diet: adat  DVT Prophylaxis: Pneumatic Compression Devices  Garcia Catheter: not present  Code Status: DNR/DNI  FEN- saline at 50     plan -the patients symptoms of weakness, anorexia and fatigue are possibly multifactorial (age, possible colon cancer). She doesn't want colonoscopy  Will need tcu or ltc. Currently constipated-small bm yesterday with ducolax.

## 2019-01-18 NOTE — PLAN OF CARE
Occupational Therapy Discharge Summary    Reason for therapy discharge:    Discharged to transitional care facility.    Progress towards therapy goal(s). See goals on Care Plan in [facility] electronic health record for goal details.  Goals partially met.  Barriers to achieving goals:   limited tolerance for therapy and discharge from facility.    Therapy recommendation(s):    Continued therapy is recommended.  Rationale/Recommendations:  Trial therapy at TCU with transition into appropriate living environment.

## 2019-01-20 NOTE — DISCHARGE SUMMARY
Admit Date:     01/15/2019   Discharge Date:     01/18/2019      HOSPITAL COURSE:  Lara Wills is a 89-year-old female with a history of hypertension, hypothyroidism, anxiety, depression, interstitial lung disease and chronic oxygen use.  She presented with increasing abdominal discomfort over several months.  She noted anorexia and occasional nausea.  She has had some increased constipation.  Ultimately, she became weaker and her family felt that she just could not continue to live in her current living situation, which was an assisted care apartment.      Upon admission, the patient had a CT which showed concentric bowel wall thickening involving the transverse colon with adjacent lymphadenopathy worrisome for colon cancer.  There was no diarrhea or signs of colitis in the hospital and it was thought that most likely her CT findings did represent colon cancer.  Ultimately this was thought to be precipitating generalized weakness, anorexia and failure to thrive.  There was some concern during her hospitalization that she had a UTI, but her urine culture was normal.      The patient was stable in the hospital.  She was continued on her chronic oxygen therapy.  She was fairly anorexic.  We did add laxatives as she was constipated.  She did have a bowel movement in the hospital.  Ultimately, she is going to need to go to long-term care or TCU.  We are still trying to determine what is the most reasonable.  She will discharge when this decision is made.      ASSESSMENT:     1.  Generalized weakness, failure to thrive, anorexia, mild abdominal pain - concern for transverse colon cancer, patient does not want further workup.   2.  Generalized anxiety and history of depression.   3.  History of hypertension.   4.  Hypothyroidism.      PLAN:  The patient will discharge when plans for either TCU or long-term care is made.  She will discharge on some Norco which she was using for back discomfort and abdominal discomfort  and an increased bowel regimen for constipation.  She may become a hospice candidate.  She was quite clear that she wanted DNR/DNI status and no further workup of her colon abnormality.  She voiced desire to die during this hospitalization and is at peace with that.     Discharge Medication List as of 1/18/2019 11:51 AM      START taking these medications    Details   HYDROcodone-acetaminophen (NORCO) 5-325 MG tablet Take 1-2 tablets by mouth every 6 hours as needed, Disp-14 tablet, R-0, Local Print      sennosides (SENOKOT) 8.6 MG tablet Take 1 tablet by mouth 2 times daily, Disp-60 tablet, R-0, Historical         CONTINUE these medications which have NOT CHANGED    Details   acetaminophen (TYLENOL) 500 MG tablet Take 1,000 mg by mouth every 8 hours as needed for mild pain, Historical      amLODIPine (NORVASC) 5 MG tablet TAKE 1 TABLET BY MOUTH TWICE DAILY, Disp-180 tablet, R-1, E-Prescribe      brimonidine (ALPHAGAN) 0.2 % ophthalmic solution Place 1 drop into both eyes 2 times daily , Historical      busPIRone (BUSPAR) 10 MG tablet Take 10 mg by mouth 2 times daily 0800, 1700, Historical      calcium-vitamin D (CALTRATE) 600-400 MG-UNIT per tablet Take 1 tablet by mouth 2 times daily At 1200, and 8 pm, Historical      docusate sodium (COLACE) 100 MG capsule Take 100 mg by mouth 3 times daily 0800, 1200, 2000, Historical      DULoxetine (CYMBALTA) 60 MG EC capsule Take 60 mg by mouth daily, Historical      latanoprost (XALATAN) 0.005 % ophthalmic solution Place 1 drop into both eyes At Bedtime, Disp-1 Bottle, R-4, Historical      levothyroxine (SYNTHROID/LEVOTHROID) 50 MCG tablet Take 1 tablet (50 mcg) by mouth daily, Disp-90 tablet, R-3, E-Prescribe      losartan (COZAAR) 25 MG tablet TAKE 1 TABLET (25 MG) BY MOUTH 2 TIMES DAILY, Disp-180 tablet, R-1, E-PrescribeProfile Rx: patient will contact pharmacy when needed      metoprolol succinate (TOPROL-XL) 25 MG 24 hr tablet Take 1 tablet (25 mg) by mouth daily,  Disp-90 tablet, R-1, E-PrescribeProfile Rx: patient will contact pharmacy when needed      mirtazapine (REMERON) 15 MG tablet Take 15 mg by mouth At Bedtime, Historical      multivitamin  with lutein (OCUVITE WITH LTEIN) CAPS Take 1 capsule by mouth daily At 1200, Historical      order for DME Oxygen 2 Li/min  at [facility]      polyethylene glycol (MIRALAX/GLYCOLAX) powder Take 1 capful by mouth daily, Historical      VITAMIN D, CHOLECALCIFEROL, PO Take 2,000 Units by mouth daily At 1200, Historical           Unresulted Labs Ordered in the Past 30 Days of this Admission     No orders found from 2018 to 2019.              TOTAL TIME SPENT:  Greater than 30 minutes spent on this.         DIONISIO MARSH MD             D: 2019   T: 2019   MT: LETY      Name:     MAVIS SHARP   MRN:      [id number]        Account:        [id number]   :      1922           Admit Date:     01/15/2019                                  Discharge Date: 2019      Document: [id number]

## 2019-01-21 PROBLEM — R62.7 ADULT FAILURE TO THRIVE: Status: ACTIVE | Noted: 2019-01-01

## 2019-01-21 PROBLEM — C18.4 MALIGNANT NEOPLASM OF TRANSVERSE COLON (H): Status: ACTIVE | Noted: 2019-01-01

## 2019-01-21 PROBLEM — J84.10 PULMONARY FIBROSIS (H): Chronic | Status: ACTIVE | Noted: 2018-02-06

## 2019-01-21 NOTE — LETTER
Hand-off  for Care Coordination  What is Care Coordination?  [facility] (formerly [facility]3] Care Coordination Services are available to people in complex situations,   for example medical, social or financial. The Care Coordinator, a SW or an RN, works with the   patient and their doctor to determine health goals, obtain resources, achieve outcomes,   and develop plans to coordinate care across settings.      o Patient Name:   Lara Wills  o Patient :     1922  o Patient PCP:     Alejandra Watson MD    o Patient Primary Clinic:   [address] [place] [id number]  o D/C Facility: _____________________________________________   o TCU Contact Info for questions: ___________________________  o D/C Date:  ______________________________________________  o Follow-up Apt with PCP after TCU D/C:   ____________________  o Other Follow-up Apt s:      _________________________________________  Additional information (concerns, and Home Care, ect ):   __________________________________________________________________  __________________________________________________________________    Care Coordinator to Contact  **Please fax this sheet back to me when pt is ready to discharge**   Apple Sauer RN   Or Milad Eason RN  Clinic Care Coordinator  [facility]   E-mail:  [email or web address] Or [email or web address]  Phone: [phone] or [phone]

## 2019-01-21 NOTE — LETTER
1/21/2019        RE: Lara Wills  [address]  [facility] [address]        [place] [facility]  PRIMARY CARE PROVIDER AND CLINIC:  Alejandra Watson [address] / [facility] [address]  Chief Complaint   Patient presents with     Hospital F/U     [place] Medical Record Number:  [id number]  Place of Service where encounter took place:  [facility] ([facility]) [[id number]]    HPI:    Lara Wills is a 89 year old  (6/12/1922),admitted to the above facility from  [facility].  Hospital stay 1/15/19 through 1/18/19.  Admitted to this facility for  rehab, medical management and nursing care.  HPI information obtained from: facility chart records, facility staff, patient report and [facility] chart review.      Lara has a past medical history significant for HTN, hypothyroidism, anxiety, depression, ILD on chronic O2.  She has had chronic and worsening abdominal discomfort over several months with anorexia and occasional nausea, increased constipation.  She became progressively weaker.  CT scan abdomen showed concentric bowel wall thickening involving the transverse colon with adjacent lymphadenopathy worrisome for colon cancer.  She chose no further work up, no treatment, and was discharged to TCU for ongoing therapy.  She lives alone in senior apartment.     Current issues are:         Malignant neoplasm of transverse colon (H)  Abdominal pain, generalized  Chronic constipation  Adult failure to thrive  Major depressive disorder, single episode, mild (H)  Pulmonary fibrosis (H)  Benign essential hypertension  Hypothyroidism, unspecified type  Osteoarthritis of knee, unspecified laterality, unspecified osteoarthritis type  Generalized anxiety disorder     Lara is seen today with her daughter Anh present.  She reports fatigue, mild nausea, and believes she remains constipated.  She reports she feels weak.  Daughter expresses concerns about  buspirone, which is currently unavailable and likely needs to be titrated to discontinue.  She reports concern for patient's anxiety and worsening depression with new diagnosis of probable colon cancer. Lara reports chronic arthritis pain/back pain for which she usually uses Tylenol on a scheduled basis.    CODE STATUS/ADVANCE DIRECTIVES DISCUSSION:   DNR / DNI  Patient's living condition: lives in an assisted living facility    ALLERGIES:Codeine sulfate and Penicillins  PAST MEDICAL HISTORY:  has no past medical history on file.  PAST SURGICAL HISTORY:  has a past surgical history that includes appendectomy (1947); breast biopsy, rt/lt; partial thyroidectomy; cataract iol, rt/lt; and vitrectomy,strip epiretinal membrane (2002).  FAMILY HISTORY: family history includes Alzheimer Disease in her brother; C.A.D. in her mother; Cancer in her daughter; Cancer - colorectal in her father; Neurologic Disorder in her brother.  SOCIAL HISTORY:  reports that  has never smoked. she has never used smokeless tobacco. She reports that she does not drink alcohol or use drugs.    Post Discharge Medication Reconciliation Status: discharge medications reconciled and changed, per note/orders (see AVS).  Current Outpatient Medications   Medication Sig Dispense Refill     acetaminophen (TYLENOL) 500 MG tablet Take 1,000 mg by mouth 3 times daily        amLODIPine (NORVASC) 5 MG tablet TAKE 1 TABLET BY MOUTH TWICE DAILY 180 tablet 1     brimonidine (ALPHAGAN) 0.2 % ophthalmic solution Place 1 drop into both eyes 2 times daily        busPIRone (BUSPAR) 10 MG tablet Take 5 mg by mouth 2 times daily 0800, 1700       [START ON 1/22/2019] busPIRone (BUSPAR) 5 MG tablet Take 5 mg by mouth daily       calcium-vitamin D (CALTRATE) 600-400 MG-UNIT per tablet Take 1 tablet by mouth 2 times daily At 1200, and 8 pm       docusate sodium (COLACE) 100 MG capsule Take 100 mg by mouth 3 times daily 0800, 1200, 2000       DULoxetine (CYMBALTA) 60 MG EC  capsule Take 60 mg by mouth daily       HYDROcodone-acetaminophen (NORCO) 5-325 MG tablet Take 1-2 tablets by mouth every 6 hours as needed 14 tablet 0     latanoprost (XALATAN) 0.005 % ophthalmic solution Place 1 drop into both eyes At Bedtime 1 Bottle 4     levothyroxine (SYNTHROID/LEVOTHROID) 50 MCG tablet Take 1 tablet (50 mcg) by mouth daily 90 tablet 3     losartan (COZAAR) 25 MG tablet TAKE 1 TABLET (25 MG) BY MOUTH 2 TIMES DAILY 180 tablet 1     metoprolol succinate (TOPROL-XL) 25 MG 24 hr tablet Take 1 tablet (25 mg) by mouth daily 90 tablet 1     mirtazapine (REMERON) 15 MG tablet Take 15 mg by mouth At Bedtime       multivitamin  with lutein (OCUVITE WITH LTEIN) CAPS Take 1 capsule by mouth daily At 1200       omeprazole (PRILOSEC) 20 MG DR capsule Take 20 mg by mouth 2 times daily       order for DME Oxygen 2 Li/min  at night       polyethylene glycol (MIRALAX/GLYCOLAX) powder Take 1 capful by mouth daily       sennosides (SENOKOT) 8.6 MG tablet Take 1 tablet by mouth 2 times daily 60 tablet 0     VITAMIN D, CHOLECALCIFEROL, PO Take 2,000 Units by mouth daily At 1200         ROS:  10 point ROS of systems including Constitutional, Eyes, Respiratory, Cardiovascular, Gastroenterology, Genitourinary, Integumentary, Musculoskeletal, Psychiatric were all negative except for pertinent positives noted in my HPI.    Exam:  /73   Pulse 89   Temp 97.8  F (36.6  C)   Resp 16   Wt 72.1 kg (159 lb)   SpO2 93%   BMI 31.05 kg/m     GENERAL APPEARANCE:  Alert, Nauseated  ENT:  Mouth and posterior oropharynx normal, moist mucous membranes  EYES:  EOM, conjunctivae, lids, pupils and irises normal  RESP:  respiratory effort and palpation of chest normal, lungs clear to auscultation , no respiratory distress  CV:  Palpation and auscultation of heart done , regular rate and rhythm, no murmur, rub, or gallop, peripheral edema Trace+ in Bilateral lower extremities  ABDOMEN:  Normal bowel sounds, tenderness in the  epigastric region, burping  M/S:   Gait and station abnormal Walking short distances with a rolling walker, fatigues easily, using wheelchair for most mobility.  PSYCH:  oriented to Situation and family,, Expresses depression and worry over recent diagnoses    Lab/Diagnostic data:     CBC RESULTS:   Recent Labs   Lab Test 01/18/19  0553 01/17/19  0648   WBC 12.0* 10.7   RBC 4.13 4.19   HGB 12.0 12.1   HCT 37.7 38.9   MCV 91 93   MCH 29.1 28.9   MCHC 31.8 31.1*   RDW 12.4 12.3   * 460*       Last Basic Metabolic Panel:  Recent Labs   Lab Test 01/18/19  0553 01/17/19  0648    139   POTASSIUM 3.8 3.8   CHLORIDE 107 104   EVA 7.8* 7.7*   CO2 27 27   BUN 9 11   CR 0.61 0.58   GLC 98 111*       Liver Function Studies -   Recent Labs   Lab Test 01/15/19  0946 10/31/17  1115   PROTTOTAL 6.5* 7.3   ALBUMIN 2.7* 3.6   BILITOTAL 0.4 0.4   ALKPHOS 79 87   AST 14 19   ALT 9 18       TSH   Date Value Ref Range Status   01/15/2019 3.06 0.40 - 4.00 mU/L Final   08/16/2018 7.94 (H) 0.40 - 4.00 mU/L Final   ]    Lab Results   Component Value Date    A1C 5.9 12/05/2013       ASSESSMENT/PLAN:  Malignant neoplasm of transverse colon (H)  Abdominal pain, generalized  Chronic constipation  Patient with ongoing mild abdominal pain, concentrated in the epigastric region.  She would like to have her Tylenol scheduled as she used it prior to admission, and does have as needed Norco for extra pain.  She also reports some constipation, and her daughter states she trends towards constipation most of the time.  Currently on senna twice daily, Colace 3 times a day, and MiraLAX daily.  We will continue to monitor bowel status and pain control.    Adult failure to thrive  She reports a mild generalized weakness, anorexia, occasional nausea over the last several months.  This is likely due to presumed colon cancer.  Goal is for a comfort focused approach, focusing on pain control and bowel management.  Her goal is to regain strength  but  may require long-term care after therapy is done.    Major depressive disorder, single episode, mild (H)  Generalized anxiety disorder  She is on buspirone, duloxetine, mirtazapine.  Her family reports significant anxiety and depression over the past several years.  Her family also reports that buspirone is becoming unavailable from the , and they have been instructed to taper to discontinue.  Family would like a better alternative to manage her anxiety, reports they have used Celexa, clonazepam, or lorazepam in the past.  After discussion with family, will taper buspirone to discontinue now, then monitor depressive symptoms and anxiety related symptoms.  May benefit from a transition off duloxetine to Zoloft.  -Monitor depressive symptoms    Pulmonary fibrosis (H)  History of chronic pulmonary fibrosis, on oxygen at night prior to admission.  Currently on oxygen 24 hours/day.  Increased oxygen needs likely due to weakness and fatigue superimposed on chronic pulmonary fibrosis.  -Attempt to taper oxygen to nighttime only use    Benign essential hypertension  On amlodipine and losartan, metoprolol.  BP today within normal limits. The current medical regimen is effective;  continue present plan and medications.   BP Readings from Last 6 Encounters:   01/21/19 124/73   01/18/19 149/76   12/21/18 134/70   12/10/18 132/72   11/26/18 153/81   08/16/18 138/74        Hypothyroidism, unspecified type  On levothyroxine 50 mcg daily, last TSH 3.06. The current medical regimen is effective;  continue present plan and medications.     Osteoarthritis of knee, unspecified laterality, unspecified osteoarthritis type  Chronic back pain  Usually uses Tylenol at home for back pain/OA pain, would like this scheduled at TCU to minimize discomfort.    Hiatal hernia  Noted to have hiatal hernia during CT scan.  She does complain of burping and reflux.  Will start PPI for comfort.       Orders:  1. Discontinue buspirone 10 mg  BID  2. Buspirone 5 mg po BID x 7 days then 5 mg po every day x 7 days then discontinue Dx anxiety  3. Discontinue prn tylenol  4. Tylenol 1000 mg po TID Dx chronic pain  5. Omeprazole 20 mg po BID Dx hiatal hernia  6. Please arrange for O2 portability.      Electronically signed by:  ROSANGELA Kirk CNP                    Sincerely,        ROSANGELA Kirk CNP

## 2019-01-21 NOTE — PROGRESS NOTES
[place] [facility]  PRIMARY CARE PROVIDER AND CLINIC:  Alejandra Watson [address] [facility] [address]  Chief Complaint   Patient presents with     Hospital F/U     [place] Medical Record Number:  [id number]  Place of Service where encounter took place:  [facility] ([facility] [[id number]]    HPI:    Lara Wills is a 89 year old  (6/12/1922),admitted to the above facility from  [facility].  Hospital stay 1/15/19 through 1/18/19.  Admitted to this facility for  rehab, medical management and nursing care.  HPI information obtained from: facility chart records, facility staff, patient report and [facility] chart review.      Lara has a past medical history significant for HTN, hypothyroidism, anxiety, depression, ILD on chronic O2.  She has had chronic and worsening abdominal discomfort over several months with anorexia and occasional nausea, increased constipation.  She became progressively weaker.  CT scan abdomen showed concentric bowel wall thickening involving the transverse colon with adjacent lymphadenopathy worrisome for colon cancer.  She chose no further work up, no treatment, and was discharged to TCU for ongoing therapy.  [facility]     Current issues are:         Malignant neoplasm of transverse colon (H)  Abdominal pain, generalized  Chronic constipation  Adult failure to thrive  Major depressive disorder, single episode, mild (H)  Pulmonary fibrosis (H)  Benign essential hypertension  Hypothyroidism, unspecified type  Osteoarthritis of knee, unspecified laterality, unspecified osteoarthritis type  Generalized anxiety disorder     Lara is seen today with her daughter Anh present.  She reports fatigue, mild nausea, and believes she remains constipated.  She reports she feels weak.  Daughter expresses concerns about buspirone, which is currently unavailable and likely needs to be titrated to discontinue.  She reports  concern for patient's anxiety and worsening depression with new diagnosis of probable colon cancer. Lara reports chronic arthritis pain/back pain for which she usually uses Tylenol on a scheduled basis.    CODE STATUS/ADVANCE DIRECTIVES DISCUSSION:   DNR / DNI  Patient's living condition: lives in an assisted living facility    ALLERGIES:Codeine sulfate and Penicillins  PAST MEDICAL HISTORY:  has no past medical history on file.  PAST SURGICAL HISTORY:  has a past surgical history that includes appendectomy (1947); breast biopsy, rt/lt; partial thyroidectomy; cataract iol, rt/lt; and vitrectomy,strip epiretinal membrane (2002).  FAMILY HISTORY: family history includes Alzheimer Disease in her brother; C.A.D. in her mother; Cancer in her daughter; Cancer - colorectal in her father; Neurologic Disorder in her brother.  SOCIAL HISTORY:  reports that  has never smoked. she has never used smokeless tobacco. She reports that she does not drink alcohol or use drugs.    Post Discharge Medication Reconciliation Status: discharge medications reconciled and changed, per note/orders (see AVS).  Current Outpatient Medications   Medication Sig Dispense Refill     acetaminophen (TYLENOL) 500 MG tablet Take 1,000 mg by mouth 3 times daily        amLODIPine (NORVASC) 5 MG tablet TAKE 1 TABLET BY MOUTH TWICE DAILY 180 tablet 1     brimonidine (ALPHAGAN) 0.2 % ophthalmic solution Place 1 drop into both eyes 2 times daily        busPIRone (BUSPAR) 10 MG tablet Take 5 mg by mouth 2 times daily 0800, 1700       [START ON 1/22/2019] busPIRone (BUSPAR) 5 MG tablet Take 5 mg by mouth daily       calcium-vitamin D (CALTRATE) 600-400 MG-UNIT per tablet Take 1 tablet by mouth 2 times daily At 1200, and 8 pm       docusate sodium (COLACE) 100 MG capsule Take 100 mg by mouth 3 times daily 0800, 1200, 2000       DULoxetine (CYMBALTA) 60 MG EC capsule Take 60 mg by mouth daily       HYDROcodone-acetaminophen (NORCO) 5-325 MG tablet Take 1-2  tablets by mouth every 6 hours as needed 14 tablet 0     latanoprost (XALATAN) 0.005 % ophthalmic solution Place 1 drop into both eyes At Bedtime 1 Bottle 4     levothyroxine (SYNTHROID/LEVOTHROID) 50 MCG tablet Take 1 tablet (50 mcg) by mouth daily 90 tablet 3     losartan (COZAAR) 25 MG tablet TAKE 1 TABLET (25 MG) BY MOUTH 2 TIMES DAILY 180 tablet 1     metoprolol succinate (TOPROL-XL) 25 MG 24 hr tablet Take 1 tablet (25 mg) by mouth daily 90 tablet 1     mirtazapine (REMERON) 15 MG tablet Take 15 mg by mouth At Bedtime       multivitamin  with lutein (OCUVITE WITH LTEIN) CAPS Take 1 capsule by mouth daily At 1200       omeprazole (PRILOSEC) 20 MG DR capsule Take 20 mg by mouth 2 times daily       order for DME Oxygen 2 Li/min  at night       polyethylene glycol (MIRALAX/GLYCOLAX) powder Take 1 capful by mouth daily       sennosides (SENOKOT) 8.6 MG tablet Take 1 tablet by mouth 2 times daily 60 tablet 0     VITAMIN D, CHOLECALCIFEROL, PO Take 2,000 Units by mouth daily At 1200         ROS:  10 point ROS of systems including Constitutional, Eyes, Respiratory, Cardiovascular, Gastroenterology, Genitourinary, Integumentary, Musculoskeletal, Psychiatric were all negative except for pertinent positives noted in my HPI.    Exam:  /73   Pulse 89   Temp 97.8  F (36.6  C)   Resp 16   Wt 72.1 kg (159 lb)   SpO2 93%   BMI 31.05 kg/m    GENERAL APPEARANCE:  Alert, Nauseated  ENT:  Mouth and posterior oropharynx normal, moist mucous membranes  EYES:  EOM, conjunctivae, lids, pupils and irises normal  RESP:  respiratory effort and palpation of chest normal, lungs clear to auscultation , no respiratory distress  CV:  Palpation and auscultation of heart done , regular rate and rhythm, no murmur, rub, or gallop, peripheral edema Trace+ in Bilateral lower extremities  ABDOMEN:  Normal bowel sounds, tenderness in the epigastric region, burping  M/S:   Gait and station abnormal Walking short distances with a rolling  walker, fatigues easily, using wheelchair for most mobility.  PSYCH:  oriented to Situation and family,, Expresses depression and worry over recent diagnoses    Lab/Diagnostic data:     CBC RESULTS:   Recent Labs   Lab Test 01/18/19  0553 01/17/19  0648   WBC 12.0* 10.7   RBC 4.13 4.19   HGB 12.0 12.1   HCT 37.7 38.9   MCV 91 93   MCH 29.1 28.9   MCHC 31.8 31.1*   RDW 12.4 12.3   * 460*       Last Basic Metabolic Panel:  Recent Labs   Lab Test 01/18/19  0553 01/17/19  0648    139   POTASSIUM 3.8 3.8   CHLORIDE 107 104   EVA 7.8* 7.7*   CO2 27 27   BUN 9 11   CR 0.61 0.58   GLC 98 111*       Liver Function Studies -   Recent Labs   Lab Test 01/15/19  0946 10/31/17  1115   PROTTOTAL 6.5* 7.3   ALBUMIN 2.7* 3.6   BILITOTAL 0.4 0.4   ALKPHOS 79 87   AST 14 19   ALT 9 18       TSH   Date Value Ref Range Status   01/15/2019 3.06 0.40 - 4.00 mU/L Final   08/16/2018 7.94 (H) 0.40 - 4.00 mU/L Final   ]    Lab Results   Component Value Date    A1C 5.9 12/05/2013       ASSESSMENT/PLAN:  Malignant neoplasm of transverse colon (H)  Abdominal pain, generalized  Chronic constipation  Patient with ongoing mild abdominal pain, concentrated in the epigastric region.  She would like to have her Tylenol scheduled as she used it prior to admission, and does have as needed Norco for extra pain.  She also reports some constipation, and her daughter states she trends towards constipation most of the time.  Currently on senna twice daily, Colace 3 times a day, and MiraLAX daily.  We will continue to monitor bowel status and pain control.    Adult failure to thrive  She reports a mild generalized weakness, anorexia, occasional nausea over the last several months.  This is likely due to presumed colon cancer.  Goal is for a comfort focused approach, focusing on pain control and bowel management.  Her goal is to regain strength  but may require long-term care after therapy is done.    Major depressive disorder, single episode,  mild (H)  Generalized anxiety disorder  She is on buspirone, duloxetine, mirtazapine.  Her family reports significant anxiety and depression over the past several years.  Her family also reports that buspirone is becoming unavailable from the , and they have been instructed to taper to discontinue.  Family would like a better alternative to manage her anxiety, reports they have used Celexa, clonazepam, or lorazepam in the past.  After discussion with family, will taper buspirone to discontinue now, then monitor depressive symptoms and anxiety related symptoms.  May benefit from a transition off duloxetine to Zoloft.  -Monitor depressive symptoms    Pulmonary fibrosis (H)  History of chronic pulmonary fibrosis, on oxygen at night prior to admission.  Currently on oxygen 24 hours/day.  Increased oxygen needs likely due to weakness and fatigue superimposed on chronic pulmonary fibrosis.  -Attempt to taper oxygen to nighttime only use    Benign essential hypertension  On amlodipine and losartan, metoprolol.  BP today within normal limits. The current medical regimen is effective;  continue present plan and medications.   BP Readings from Last 6 Encounters:   01/21/19 124/73   01/18/19 149/76   12/21/18 134/70   12/10/18 132/72   11/26/18 153/81   08/16/18 138/74        Hypothyroidism, unspecified type  On levothyroxine 50 mcg daily, last TSH 3.06. The current medical regimen is effective;  continue present plan and medications.     Osteoarthritis of knee, unspecified laterality, unspecified osteoarthritis type  Chronic back pain  Usually uses Tylenol at home for back pain/OA pain, would like this scheduled at TCU to minimize discomfort.    Hiatal hernia  Noted to have hiatal hernia during CT scan.  She does complain of burping and reflux.  Will start PPI for comfort.       Orders:  1. Discontinue buspirone 10 mg BID  2. Buspirone 5 mg po BID x 7 days then 5 mg po every day x 7 days then discontinue Dx  anxiety  3. Discontinue prn tylenol  4. Tylenol 1000 mg po TID Dx chronic pain  5. Omeprazole 20 mg po BID Dx hiatal hernia  6. Please arrange for O2 portability.      Electronically signed by:  ROSANGELA Kirk CNP

## 2019-01-21 NOTE — PROGRESS NOTES
Clinic Care Coordination Contact  Care Coordination Transition Communication    Referral Source: IP Handoff    Clinical Data: Patient was hospitalized at [facility] from 1/15/19 to 1/18/19 with diagnosis of weakness, fragility, possible colon cancer.     Transition to Facility:              Fac[facility] Name: [facility]              Contact name and phone number/fax: Natacha PALOMARES  [phone] Fax [phone].    Plan: RN/SW Care Coordinator will await notification from facility staff informing RN/SW Care Coordinator of patient's discharge plans/needs. RN/SW Care Coordinator will review chart and outreach to facility staff every 4 weeks and as needed.     Apple Sauer RN, [facility]  RN Care Coordinator  [facility]  Phone # [phone]  1/21/2019 9:57 AM

## 2019-01-23 NOTE — LETTER
1/23/2019        RE: Lara Wills  [address]  [facility] [address]        [place] [facility]    Chief Complaint   Patient presents with     custodial Acute       [place] Medical Record Number:  [id number]  Place of Service where encounter took place:  [facility] ([facility] [[id number]]    HPI:    Lara Wills is a 89 year old  (6/12/1922), who is being seen today for an episodic care visit.  HPI information obtained from: facility chart records, facility staff, patient report and [facility] chart review.Today's concern is:     Malignant neoplasm of transverse colon (H)  Abdominal pain, generalized  Chronic constipation  Adult failure to thrive  Benign essential hypertension     Lara reports ongoing fatigue, malaise, occasional nausea and vomited last night.  Daughter Anh notes burping especially after meals.    ALLERGIES: Codeine sulfate and Penicillins  Past Medical, Surgical, Family and Social History reviewed and updated in [facility].    Current Outpatient Medications   Medication Sig Dispense Refill     acetaminophen (TYLENOL) 500 MG tablet Take 1,000 mg by mouth 3 times daily        amLODIPine (NORVASC) 5 MG tablet TAKE 1 TABLET BY MOUTH TWICE DAILY 180 tablet 1     brimonidine (ALPHAGAN) 0.2 % ophthalmic solution Place 1 drop into both eyes 2 times daily        busPIRone (BUSPAR) 10 MG tablet Take 5 mg by mouth 2 times daily 0800, 1700       busPIRone (BUSPAR) 5 MG tablet Take 5 mg by mouth daily       docusate sodium (COLACE) 100 MG capsule Take 100 mg by mouth 3 times daily 0800, 1200, 2000       DULoxetine (CYMBALTA) 60 MG EC capsule Take 60 mg by mouth daily       latanoprost (XALATAN) 0.005 % ophthalmic solution Place 1 drop into both eyes At Bedtime 1 Bottle 4     levothyroxine (SYNTHROID/LEVOTHROID) 50 MCG tablet Take 1 tablet (50 mcg) by mouth daily 90 tablet 3     losartan (COZAAR) 25 MG tablet TAKE 1 TABLET (25 MG) BY MOUTH 2 TIMES DAILY 180  tablet 1     metoprolol succinate (TOPROL-XL) 25 MG 24 hr tablet Take 1 tablet (25 mg) by mouth daily 90 tablet 1     mirtazapine (REMERON) 15 MG tablet Take 15 mg by mouth At Bedtime       omeprazole (PRILOSEC) 20 MG DR capsule Take 20 mg by mouth 2 times daily       order for DME Oxygen 2 Li/min  at night       polyethylene glycol (MIRALAX/GLYCOLAX) powder Take 1 capful by mouth daily       sennosides (SENOKOT) 8.6 MG tablet Take 1 tablet by mouth 2 times daily 60 tablet 0     Medications reviewed:  Medications reconciled to facility chart and changes were made to reflect current medications as identified as above med list. Below are the changes that were made:   Medications stopped since last EPIC medication reconciliation:   There are no discontinued medications.    Medications started since last [facility] medication reconciliation:  No orders of the defined types were placed in this encounter.        REVIEW OF SYSTEMS:  4 point ROS including Respiratory, CV, GI and , other than that noted in the HPI,  is negative    Physical Exam:  /83   Pulse 90   Temp 97.9  F (36.6  C)   Resp 20   Wt 70.8 kg (156 lb)   SpO2 94%   BMI 30.47 kg/m     GENERAL APPEARANCE:  Alert, Fatigued, pale  RESP:  respiratory effort and palpation of chest normal, lungs clear to auscultation , no respiratory distress  CV:  Palpation and auscultation of heart done , no edema, rate-normal, grade 3/6 murmur  ABDOMEN:  Normal bowel sounds, slightly tender, no hepatosplenomegaly or other masses noted, obese  M/S:   Gait and station abnormal Walking short distances with a rolling walker fatigues easily needs assist to transfer  PSYCH:  oriented X 3, Affect and mood at baseline    Recent Labs:     CBC RESULTS:   Recent Labs   Lab Test 01/18/19  0553 01/17/19  0648   WBC 12.0* 10.7   RBC 4.13 4.19   HGB 12.0 12.1   HCT 37.7 38.9   MCV 91 93   MCH 29.1 28.9   MCHC 31.8 31.1*   RDW 12.4 12.3   * 460*       Last Basic Metabolic  Panel:  Recent Labs   Lab Test 01/18/19  0553 01/17/19  0648    139   POTASSIUM 3.8 3.8   CHLORIDE 107 104   EVA 7.8* 7.7*   CO2 27 27   BUN 9 11   CR 0.61 0.58   GLC 98 111*       Liver Function Studies -   Recent Labs   Lab Test 01/15/19  0946 10/31/17  1115   PROTTOTAL 6.5* 7.3   ALBUMIN 2.7* 3.6   BILITOTAL 0.4 0.4   ALKPHOS 79 87   AST 14 19   ALT 9 18       TSH   Date Value Ref Range Status   01/15/2019 3.06 0.40 - 4.00 mU/L Final   08/16/2018 7.94 (H) 0.40 - 4.00 mU/L Final   ]      Assessment/Plan:  Malignant neoplasm of transverse colon (H)  Abdominal pain, generalized  Bowels appear to be working a little better, she is eating a little.  Stable with a comfort focused approach.    Chronic constipation  Senna, Colace, MiraLAX.  Did require Dulcolax suppository this week with good results.  Discontinue calcium as this could contribute to constipation and is likely of little benefit in end-stage disease.  The current medical regimen is effective;  continue present plan and medications.     Adult failure to thrive  She is eating a little bit, drinking a little bit, stable.  Family is supportive, and plan is for long-term care when available.  Will discontinue multivitamin and vitamin D supplement as these are likely of little benefit at this point and may contribute to upset stomach.    Benign essential hypertension  Remains on amlodipine, losartan, metoprolol.  Blood pressure goal less than 140/80, see vitals above. The current medical regimen is effective;  continue present plan and medications.       Orders:  1. Discontinue Vit D supplement  2. Discontinue calcium supplemet  3. Discontinue multivitamin    Electronically signed by  ROSANGELA Kirk CNP                      Sincerely,        ROSANGELA Kirk CNP

## 2019-01-23 NOTE — PROGRESS NOTES
[place] [facility]    Chief Complaint   Patient presents with     MCFP Acute       [place] Medical Record Number:  [id number]  Place of Service where encounter took place:  [facility] ([facility] [[id number]]    HPI:    Lara Wills is a 89 year old  (6/12/1922), who is being seen today for an episodic care visit.  HPI information obtained from: facility chart records, facility staff, patient report and [facility] chart review.Today's concern is:     Malignant neoplasm of transverse colon (H)  Abdominal pain, generalized  Chronic constipation  Adult failure to thrive  Benign essential hypertension     Lara reports ongoing fatigue, malaise, occasional nausea and vomited last night.  Daughter Anh notes burping especially after meals.    ALLERGIES: Codeine sulfate and Penicillins  Past Medical, Surgical, Family and Social History reviewed and updated in [facility].    Current Outpatient Medications   Medication Sig Dispense Refill     acetaminophen (TYLENOL) 500 MG tablet Take 1,000 mg by mouth 3 times daily        amLODIPine (NORVASC) 5 MG tablet TAKE 1 TABLET BY MOUTH TWICE DAILY 180 tablet 1     brimonidine (ALPHAGAN) 0.2 % ophthalmic solution Place 1 drop into both eyes 2 times daily        busPIRone (BUSPAR) 10 MG tablet Take 5 mg by mouth 2 times daily 0800, 1700       busPIRone (BUSPAR) 5 MG tablet Take 5 mg by mouth daily       docusate sodium (COLACE) 100 MG capsule Take 100 mg by mouth 3 times daily 0800, 1200, 2000       DULoxetine (CYMBALTA) 60 MG EC capsule Take 60 mg by mouth daily       latanoprost (XALATAN) 0.005 % ophthalmic solution Place 1 drop into both eyes At Bedtime 1 Bottle 4     levothyroxine (SYNTHROID/LEVOTHROID) 50 MCG tablet Take 1 tablet (50 mcg) by mouth daily 90 tablet 3     losartan (COZAAR) 25 MG tablet TAKE 1 TABLET (25 MG) BY MOUTH 2 TIMES DAILY 180 tablet 1     metoprolol succinate (TOPROL-XL) 25 MG 24 hr tablet Take 1 tablet (25 mg) by mouth daily  90 tablet 1     mirtazapine (REMERON) 15 MG tablet Take 15 mg by mouth At Bedtime       omeprazole (PRILOSEC) 20 MG DR capsule Take 20 mg by mouth 2 times daily       order for DME Oxygen 2 Li/min  at night       polyethylene glycol (MIRALAX/GLYCOLAX) powder Take 1 capful by mouth daily       sennosides (SENOKOT) 8.6 MG tablet Take 1 tablet by mouth 2 times daily 60 tablet 0     Medications reviewed:  Medications reconciled to facility chart and changes were made to reflect current medications as identified as above med list. Below are the changes that were made:   Medications stopped since last EPIC medication reconciliation:   There are no discontinued medications.    Medications started since last [facility] medication reconciliation:  No orders of the defined types were placed in this encounter.        REVIEW OF SYSTEMS:  4 point ROS including Respiratory, CV, GI and , other than that noted in the HPI,  is negative    Physical Exam:  /83   Pulse 90   Temp 97.9  F (36.6  C)   Resp 20   Wt 70.8 kg (156 lb)   SpO2 94%   BMI 30.47 kg/m    GENERAL APPEARANCE:  Alert, Fatigued, pale  RESP:  respiratory effort and palpation of chest normal, lungs clear to auscultation , no respiratory distress  CV:  Palpation and auscultation of heart done , no edema, rate-normal, grade 3/6 murmur  ABDOMEN:  Normal bowel sounds, slightly tender, no hepatosplenomegaly or other masses noted, obese  M/S:   Gait and station abnormal Walking short distances with a rolling walker fatigues easily needs assist to transfer  PSYCH:  oriented X 3, Affect and mood at baseline    Recent Labs:     CBC RESULTS:   Recent Labs   Lab Test 01/18/19  0553 01/17/19  0648   WBC 12.0* 10.7   RBC 4.13 4.19   HGB 12.0 12.1   HCT 37.7 38.9   MCV 91 93   MCH 29.1 28.9   MCHC 31.8 31.1*   RDW 12.4 12.3   * 460*       Last Basic Metabolic Panel:  Recent Labs   Lab Test 01/18/19  0553 01/17/19  0648    139   POTASSIUM 3.8 3.8   CHLORIDE 107 104    EVA 7.8* 7.7*   CO2 27 27   BUN 9 11   CR 0.61 0.58   GLC 98 111*       Liver Function Studies -   Recent Labs   Lab Test 01/15/19  0946 10/31/17  1115   PROTTOTAL 6.5* 7.3   ALBUMIN 2.7* 3.6   BILITOTAL 0.4 0.4   ALKPHOS 79 87   AST 14 19   ALT 9 18       TSH   Date Value Ref Range Status   01/15/2019 3.06 0.40 - 4.00 mU/L Final   08/16/2018 7.94 (H) 0.40 - 4.00 mU/L Final   ]      Assessment/Plan:  Malignant neoplasm of transverse colon (H)  Abdominal pain, generalized  Bowels appear to be working a little better, she is eating a little.  Stable with a comfort focused approach.    Chronic constipation  Senna, Colace, MiraLAX.  Did require Dulcolax suppository this week with good results.  Discontinue calcium as this could contribute to constipation and is likely of little benefit in end-stage disease.  The current medical regimen is effective;  continue present plan and medications.     Adult failure to thrive  She is eating a little bit, drinking a little bit, stable.  Family is supportive, and plan is for long-term care when available.  Will discontinue multivitamin and vitamin D supplement as these are likely of little benefit at this point and may contribute to upset stomach.    Benign essential hypertension  Remains on amlodipine, losartan, metoprolol.  Blood pressure goal less than 140/80, see vitals above. The current medical regimen is effective;  continue present plan and medications.       Orders:  1. Discontinue Vit D supplement  2. Discontinue calcium supplemet  3. Discontinue multivitamin    Electronically signed by  ROSANGELA Kirk CNP

## 2019-01-28 NOTE — LETTER
1/28/2019        RE: Lara Wills  [address]  [facility] [address]        [place] [facility]    Chief Complaint   Patient presents with     retirement Acute       [place] Medical Record Number:  [id number]  Place of Service where encounter took place:  [facility] [[id number]]    HPI:    Lara Wills is a 89 year old  (6/12/1922), who is being seen today for an episodic care visit.  HPI information obtained from: facility chart records, facility staff, patient report and [facility] chart review.Today's concern is:     Malignant neoplasm of transverse colon (H)  Abdominal pain, generalized  Benign essential hypertension  Chronic constipation  Adult failure to thrive  Pulmonary fibrosis (H)  Major depressive disorder, single episode, mild (H)  Generalized anxiety disorder  Hypoxemia  Insomnia, unspecified type     Lara reports chronic difficulty with sleep, as well as generalized malaise.  She reports she has occasional abdominal pain, but generally is eating well.  Family reports concerns that she is taking too much Norco, as prior to this hospital stay she was not using any pain medication other than tylenol.  She reports she is constipated today, and nursing endorses this.     ALLERGIES: Codeine sulfate and Penicillins  Past Medical, Surgical, Family and Social History reviewed and updated in [facility].    Current Outpatient Medications   Medication Sig Dispense Refill     acetaminophen (TYLENOL) 500 MG tablet Take 1,000 mg by mouth 3 times daily        amLODIPine (NORVASC) 5 MG tablet TAKE 1 TABLET BY MOUTH TWICE DAILY 180 tablet 1     brimonidine (ALPHAGAN) 0.2 % ophthalmic solution Place 1 drop into both eyes 2 times daily        busPIRone (BUSPAR) 5 MG tablet Take 5 mg by mouth daily       docusate sodium (COLACE) 100 MG capsule Take 100 mg by mouth 3 times daily 0800, 1200, 2000       DULoxetine (CYMBALTA) 60 MG EC capsule Take 60 mg by mouth daily        HYDROcodone-acetaminophen (NORCO) 5-325 MG tablet Take 1 tablet by mouth every 4 hours as needed for severe pain        latanoprost (XALATAN) 0.005 % ophthalmic solution Place 1 drop into both eyes At Bedtime 1 Bottle 4     levothyroxine (SYNTHROID/LEVOTHROID) 50 MCG tablet Take 1 tablet (50 mcg) by mouth daily 90 tablet 3     losartan (COZAAR) 25 MG tablet TAKE 1 TABLET (25 MG) BY MOUTH 2 TIMES DAILY 180 tablet 1     metoprolol succinate (TOPROL-XL) 25 MG 24 hr tablet Take 1 tablet (25 mg) by mouth daily 90 tablet 1     mirtazapine (REMERON) 7.5 MG tablet Take 7.5 mg by mouth At Bedtime       omeprazole (PRILOSEC) 20 MG DR capsule Take 20 mg by mouth 2 times daily       order for DME Oxygen 2 Li/min  at night       polyethylene glycol (MIRALAX/GLYCOLAX) powder Take 1 capful by mouth daily       sennosides (SENOKOT) 8.6 MG tablet Take 2 tablets by mouth 2 times daily  60 tablet 0     Medications reviewed:  Medications reconciled to facility chart and changes were made to reflect current medications as identified as above med list. Below are the changes that were made:   Medications stopped since last EPIC medication reconciliation:   Medications Discontinued During This Encounter   Medication Reason     busPIRone (BUSPAR) 10 MG tablet        Medications started since last [facility] medication reconciliation:  Orders Placed This Encounter   Medications     HYDROcodone-acetaminophen (NORCO) 5-325 MG tablet     Sig: Take 1-2 tablets by mouth every 6 hours as needed for severe pain         REVIEW OF SYSTEMS:  4 point ROS including Respiratory, CV, GI and , other than that noted in the HPI,  is negative    Physical Exam:  /69   Pulse 80   Temp 97.6  F (36.4  C)   Resp 17   Wt 70.8 kg (156 lb)   SpO2 92%   BMI 30.47 kg/m     GENERAL APPEARANCE:  Alert, cooperative, able to make needs knownw.   RESP:  respiratory effort and palpation of chest normal, lungs clear to auscultation , no respiratory distress  CV:   Palpation and auscultation of heart done , regular rate and rhythm, no murmur, rub, or gallop, no edema  M/S:   Gait and station abnormal walks short distances with rolling walker, weak and unsteady  PSYCH:  oriented X 3, memory impaired     Recent Labs:     CBC RESULTS:   Recent Labs   Lab Test 01/18/19  0553 01/17/19  0648   WBC 12.0* 10.7   RBC 4.13 4.19   HGB 12.0 12.1   HCT 37.7 38.9   MCV 91 93   MCH 29.1 28.9   MCHC 31.8 31.1*   RDW 12.4 12.3   * 460*       Last Basic Metabolic Panel:  Recent Labs   Lab Test 01/18/19  0553 01/17/19  0648    139   POTASSIUM 3.8 3.8   CHLORIDE 107 104   EVA 7.8* 7.7*   CO2 27 27   BUN 9 11   CR 0.61 0.58   GLC 98 111*       Liver Function Studies -   Recent Labs   Lab Test 01/15/19  0946 10/31/17  1115   PROTTOTAL 6.5* 7.3   ALBUMIN 2.7* 3.6   BILITOTAL 0.4 0.4   ALKPHOS 79 87   AST 14 19   ALT 9 18       TSH   Date Value Ref Range Status   01/15/2019 3.06 0.40 - 4.00 mU/L Final   08/16/2018 7.94 (H) 0.40 - 4.00 mU/L Final   ]    Lab Results   Component Value Date    A1C 5.9 12/05/2013         Assessment/Plan:  Malignant neoplasm of transverse colon (H)  Abdominal pain, generalized  Continues with some abdominal pain, controlled with current regimen.  Family is concerned that increase in norco use is causing increased somnolence.  There is also concern for increased constipation. The current medical regimen is effective;  continue present plan and will trial slight decrease in norco to assess for changes.     Benign essential hypertension  BP within normal limits, no new concerns. The current medical regimen is effective;  continue present plan and medications.     Chronic constipation  Noted to have chronic constipation, still a problem today.  Will increase senna s. See orders below.     Adult failure to thrive  She is eating fair, drinking fair, and generally stable.  Goal remains for her to return to her assisted living facility apartment, but she is uncertain  if she can increase her strength enough to do this or not.  Working with therapy , continue.     Pulmonary fibrosis (H)  Hypoxemia  Continues to de-sat usually at rest/night.  She is currently using O2 continuously.  Will attempt to wean during the day to increase her mobility and independence.      Major depressive disorder, single episode, mild (H)  Generalized anxiety disorder  Stable on current regimen.  Continues with buspar wean. She is also on mirtazapine 15 mg daily, and daughter reports she is drifting off to sleep often during the day.  Concerns that this is ongoing side effect from mirtazapine.  She may benefit from taper of mirtazapine as well.  See orders, daughter agrees.     Insomnia, unspecified type  She continues to have complaints of poor sleep at night, no nursing reports to reflect this.  Will monitor hours of sleep.       Orders:  1. Please monitor hours of sleep  2. Okay to wean O2 while awake/ at therapy to keep sats > 90%  3. Discontinue norco 5/325 1-2 tabs q 6 hr prn  4. Norco 5/325 1 tab po Q 4 hr prn pain  5. Discontinue remeron 15  6. remeron 7.5 mg po at bedtime Dx depression  7. Discontinue senna s 1 tab BID  8. Sennosides 2 tabs po BID Dx constipation  9. If patient complains of pain, try non-pharm measures before use of norco ( walk, snack, position change)- goal to reduce amount of norco needed  10. Lab draw 1/30/19 HGB and BMP Dx fatigue and HTN    Electronically signed by  ROSANGELA Kirk CNP              Sincerely,        ROSANGELA Kirk CNP

## 2019-01-28 NOTE — PROGRESS NOTES
[place] [facility]    Chief Complaint   Patient presents with     custodial Acute       [place] Medical Record Number:  [id number]  Place of Service where encounter took place:  [facility] ([facility] [[id number]]    HPI:    Lara Wills is a 89 year old  (6/12/1922), who is being seen today for an episodic care visit.  HPI information obtained from: facility chart records, facility staff, patient report and [facility] chart review.Today's concern is:     Malignant neoplasm of transverse colon (H)  Abdominal pain, generalized  Benign essential hypertension  Chronic constipation  Adult failure to thrive  Pulmonary fibrosis (H)  Major depressive disorder, single episode, mild (H)  Generalized anxiety disorder  Hypoxemia  Insomnia, unspecified type     Lara reports chronic difficulty with sleep, as well as generalized malaise.  She reports she has occasional abdominal pain, but generally is eating well.  Family reports concerns that she is taking too much Norco, as prior to this hospital stay she was not using any pain medication other than tylenol.  She reports she is constipated today, and nursing endorses this.     ALLERGIES: Codeine sulfate and Penicillins  Past Medical, Surgical, Family and Social History reviewed and updated in [facility].    Current Outpatient Medications   Medication Sig Dispense Refill     acetaminophen (TYLENOL) 500 MG tablet Take 1,000 mg by mouth 3 times daily        amLODIPine (NORVASC) 5 MG tablet TAKE 1 TABLET BY MOUTH TWICE DAILY 180 tablet 1     brimonidine (ALPHAGAN) 0.2 % ophthalmic solution Place 1 drop into both eyes 2 times daily        busPIRone (BUSPAR) 5 MG tablet Take 5 mg by mouth daily       docusate sodium (COLACE) 100 MG capsule Take 100 mg by mouth 3 times daily 0800, 1200, 2000       DULoxetine (CYMBALTA) 60 MG EC capsule Take 60 mg by mouth daily       HYDROcodone-acetaminophen (NORCO) 5-325 MG tablet Take 1 tablet by mouth every 4 hours as  needed for severe pain        latanoprost (XALATAN) 0.005 % ophthalmic solution Place 1 drop into both eyes At Bedtime 1 Bottle 4     levothyroxine (SYNTHROID/LEVOTHROID) 50 MCG tablet Take 1 tablet (50 mcg) by mouth daily 90 tablet 3     losartan (COZAAR) 25 MG tablet TAKE 1 TABLET (25 MG) BY MOUTH 2 TIMES DAILY 180 tablet 1     metoprolol succinate (TOPROL-XL) 25 MG 24 hr tablet Take 1 tablet (25 mg) by mouth daily 90 tablet 1     mirtazapine (REMERON) 7.5 MG tablet Take 7.5 mg by mouth At Bedtime       omeprazole (PRILOSEC) 20 MG DR capsule Take 20 mg by mouth 2 times daily       order for DME Oxygen 2 Li/min  at night       polyethylene glycol (MIRALAX/GLYCOLAX) powder Take 1 capful by mouth daily       sennosides (SENOKOT) 8.6 MG tablet Take 2 tablets by mouth 2 times daily  60 tablet 0     Medications reviewed:  Medications reconciled to facility chart and changes were made to reflect current medications as identified as above med list. Below are the changes that were made:   Medications stopped since last EPIC medication reconciliation:   Medications Discontinued During This Encounter   Medication Reason     busPIRone (BUSPAR) 10 MG tablet        Medications started since last [facility] medication reconciliation:  Orders Placed This Encounter   Medications     HYDROcodone-acetaminophen (NORCO) 5-325 MG tablet     Sig: Take 1-2 tablets by mouth every 6 hours as needed for severe pain         REVIEW OF SYSTEMS:  4 point ROS including Respiratory, CV, GI and , other than that noted in the HPI,  is negative    Physical Exam:  /69   Pulse 80   Temp 97.6  F (36.4  C)   Resp 17   Wt 70.8 kg (156 lb)   SpO2 92%   BMI 30.47 kg/m    GENERAL APPEARANCE:  Alert, cooperative, able to make needs knownw.   RESP:  respiratory effort and palpation of chest normal, lungs clear to auscultation , no respiratory distress  CV:  Palpation and auscultation of heart done , regular rate and rhythm, no murmur, rub, or gallop,  no edema  M/S:   Gait and station abnormal walks short distances with rolling walker, weak and unsteady  PSYCH:  oriented X 3, memory impaired     Recent Labs:     CBC RESULTS:   Recent Labs   Lab Test 01/18/19  0553 01/17/19  0648   WBC 12.0* 10.7   RBC 4.13 4.19   HGB 12.0 12.1   HCT 37.7 38.9   MCV 91 93   MCH 29.1 28.9   MCHC 31.8 31.1*   RDW 12.4 12.3   * 460*       Last Basic Metabolic Panel:  Recent Labs   Lab Test 01/18/19  0553 01/17/19  0648    139   POTASSIUM 3.8 3.8   CHLORIDE 107 104   EVA 7.8* 7.7*   CO2 27 27   BUN 9 11   CR 0.61 0.58   GLC 98 111*       Liver Function Studies -   Recent Labs   Lab Test 01/15/19  0946 10/31/17  1115   PROTTOTAL 6.5* 7.3   ALBUMIN 2.7* 3.6   BILITOTAL 0.4 0.4   ALKPHOS 79 87   AST 14 19   ALT 9 18       TSH   Date Value Ref Range Status   01/15/2019 3.06 0.40 - 4.00 mU/L Final   08/16/2018 7.94 (H) 0.40 - 4.00 mU/L Final   ]    Lab Results   Component Value Date    A1C 5.9 12/05/2013         Assessment/Plan:  Malignant neoplasm of transverse colon (H)  Abdominal pain, generalized  Continues with some abdominal pain, controlled with current regimen.  Family is concerned that increase in norco use is causing increased somnolence.  There is also concern for increased constipation. The current medical regimen is effective;  continue present plan and will trial slight decrease in norco to assess for changes.     Benign essential hypertension  BP within normal limits, no new concerns. The current medical regimen is effective;  continue present plan and medications.     Chronic constipation  Noted to have chronic constipation, still a problem today.  Will increase senna s. See orders below.     Adult failure to thrive  She is eating fair, drinking fair, and generally stable.  Goal remains for her to return to her assisted living facility apartment, but she is uncertain if she can increase her strength enough to do this or not.  Working with therapy , continue.      Pulmonary fibrosis (H)  Hypoxemia  Continues to de-sat usually at rest/night.  She is currently using O2 continuously.  Will attempt to wean during the day to increase her mobility and independence.      Major depressive disorder, single episode, mild (H)  Generalized anxiety disorder  Stable on current regimen.  Continues with buspar wean. She is also on mirtazapine 15 mg daily, and daughter reports she is drifting off to sleep often during the day.  Concerns that this is ongoing side effect from mirtazapine.  She may benefit from taper of mirtazapine as well.  See orders, daughter agrees.     Insomnia, unspecified type  She continues to have complaints of poor sleep at night, no nursing reports to reflect this.  Will monitor hours of sleep.       Orders:  1. Please monitor hours of sleep  2. Okay to wean O2 while awake/ at therapy to keep sats > 90%  3. Discontinue norco 5/325 1-2 tabs q 6 hr prn  4. Norco 5/325 1 tab po Q 4 hr prn pain  5. Discontinue remeron 15  6. remeron 7.5 mg po at bedtime Dx depression  7. Discontinue senna s 1 tab BID  8. Sennosides 2 tabs po BID Dx constipation  9. If patient complains of pain, try non-pharm measures before use of norco ( walk, snack, position change)- goal to reduce amount of norco needed  10. Lab draw 1/30/19 HGB and BMP Dx fatigue and HTN    Electronically signed by  ROSANGELA Kirk CNP

## 2019-01-31 NOTE — LETTER
1/31/2019        RE: Lara Wills  [address]  [facility] [id number]        [place] [facility]    Chief Complaint   Patient presents with     shelter Acute       [place] Medical Record Number:  [id number]  Place of Service where encounter took place:  [facility] [[id number]]    HPI:    Lara Wills is a 89 year old  (6/12/1922), who is being seen today for an episodic care visit.  HPI information obtained from: facility chart records, facility staff, patient report and [facility] chart review.Today's concern is:     Malignant neoplasm of transverse colon (H)  Abdominal pain, generalized  Benign essential hypertension  Chronic constipation  Generalized anxiety disorder  Major depressive disorder, single episode, mild (H)  Chronic back pain, unspecified back location, unspecified back pain laterality     Patient continues to have complaints of pain in her stomach which is limiting her ability to sleep but she is observed to be sleeping much of the night.  She is receiving several norco doses per day, and is somewhat confused.  She is also exhibiting symptoms of paranoia with family.  Goal to return to assisted living facility next week, with support of home care or potentially hospice.      ALLERGIES: Codeine sulfate and Penicillins  Past Medical, Surgical, Family and Social History reviewed and updated in [facility].    Current Outpatient Medications   Medication Sig Dispense Refill     acetaminophen (TYLENOL) 500 MG tablet Take 1,000 mg by mouth every 6 hours        amLODIPine (NORVASC) 5 MG tablet TAKE 1 TABLET BY MOUTH TWICE DAILY 180 tablet 1     brimonidine (ALPHAGAN) 0.2 % ophthalmic solution Place 1 drop into both eyes 2 times daily        busPIRone (BUSPAR) 5 MG tablet Take 5 mg by mouth daily       docusate sodium (COLACE) 100 MG capsule Take 100 mg by mouth 3 times daily 0800, 1200, 2000       DULoxetine (CYMBALTA) 60 MG EC capsule Take 60 mg by  mouth daily       HYDROcodone-acetaminophen (NORCO) 5-325 MG tablet Take 1 tablet by mouth every 4 hours as needed for severe pain        latanoprost (XALATAN) 0.005 % ophthalmic solution Place 1 drop into both eyes At Bedtime 1 Bottle 4     levothyroxine (SYNTHROID/LEVOTHROID) 50 MCG tablet Take 1 tablet (50 mcg) by mouth daily 90 tablet 3     losartan (COZAAR) 25 MG tablet TAKE 1 TABLET (25 MG) BY MOUTH 2 TIMES DAILY 180 tablet 1     metoprolol succinate (TOPROL-XL) 25 MG 24 hr tablet Take 1 tablet (25 mg) by mouth daily 90 tablet 1     mirtazapine (REMERON) 7.5 MG tablet Take 7.5 mg by mouth At Bedtime       omeprazole (PRILOSEC) 20 MG DR capsule Take 20 mg by mouth 2 times daily       order for DME Oxygen 2 Li/min  at night       polyethylene glycol (MIRALAX/GLYCOLAX) powder Take 1 capful by mouth daily       sennosides (SENOKOT) 8.6 MG tablet Take 2 tablets by mouth 2 times daily  60 tablet 0     Medications reviewed:  Medications reconciled to facility chart and changes were made to reflect current medications as identified as above med list. Below are the changes that were made:   Medications stopped since last EPIC medication reconciliation:   There are no discontinued medications.    Medications started since last [facility] medication reconciliation:  No orders of the defined types were placed in this encounter.        REVIEW OF SYSTEMS:  4 point ROS including Respiratory, CV, GI and , other than that noted in the HPI,  is negative    Physical Exam:  /72   Pulse 89   Temp 98.4  F (36.9  C)   Resp 18   Wt 69.4 kg (153 lb)   SpO2 92%   BMI 29.88 kg/m     GENERAL APPEARANCE:  Alert, anxious  RESP:  respiratory effort and palpation of chest normal, lungs clear to auscultation , no respiratory distress  CV:  Palpation and auscultation of heart done , regular rate and rhythm, no murmur, rub, or gallop, no edema  ABDOMEN:  normal bowel sounds, tender in upper quadrants,   M/S:   Gait and station  normal  PSYCH:  oriented X 3, insight and judgement impaired, memory impaired     Recent Labs:     CBC RESULTS:   Recent Labs   Lab Test 01/18/19  0553 01/17/19  0648   WBC 12.0* 10.7   RBC 4.13 4.19   HGB 12.0 12.1   HCT 37.7 38.9   MCV 91 93   MCH 29.1 28.9   MCHC 31.8 31.1*   RDW 12.4 12.3   * 460*       Last Basic Metabolic Panel:  Recent Labs   Lab Test 01/18/19  0553 01/17/19  0648    139   POTASSIUM 3.8 3.8   CHLORIDE 107 104   EVA 7.8* 7.7*   CO2 27 27   BUN 9 11   CR 0.61 0.58   GLC 98 111*       Liver Function Studies -   Recent Labs   Lab Test 01/15/19  0946 10/31/17  1115   PROTTOTAL 6.5* 7.3   ALBUMIN 2.7* 3.6   BILITOTAL 0.4 0.4   ALKPHOS 79 87   AST 14 19   ALT 9 18       TSH   Date Value Ref Range Status   01/15/2019 3.06 0.40 - 4.00 mU/L Final   08/16/2018 7.94 (H) 0.40 - 4.00 mU/L Final   ]    Lab Results   Component Value Date    A1C 5.9 12/05/2013         Assessment/Plan:  Malignant neoplasm of transverse colon (H)  Abdominal pain, generalized  Likely does have some chronic abdominal pain, but not always well controlled with norco which does cause some confusion as well.  Will trial with increase of tylenol and hold norco for a few days    Benign essential hypertension  BP within normal limits, The current medical regimen is effective;  continue present plan and medications.     Chronic constipation  Continues to vary between constipation and reports of diarrhea.  No BM today, small putty like yesterday and x2 diarrhea day before.  She often refuses bowel medications.  Will continue to offer and monitor stools, adjust as able.     Generalized anxiety disorder  Major depressive disorder, single episode, mild (H)  Anxious at times, often calls staff in to sit with her.  She has history of anxiety.  We are weaning buspirone and decreased remeron with goal to simplify regimen.  If needed, may switch from duloxetine to sertraline for better anxiety control in the elderly population.  "    Chronic back pain, unspecified back location, unspecified back pain laterality  Chronic back pain under good control, does have some abdominal pain.  See orders, trying to minimize narcotic use.       Orders:  1. Discontinue \"offer prn narcotics\"  2. Hold all norco orders x 5 days  3. Discontinue tylenol 1000mg  4. Tylenol 1000 mg po Q 6 hr Dx Pain    Electronically signed by  ROSANGELA Kirk CNP        Sincerely,        ROSANGELA Kirk CNP    "

## 2019-01-31 NOTE — PROGRESS NOTES
[place] [facility]    Chief Complaint   Patient presents with     MCFP Acute       [place] Medical Record Number:  [id number]  Place of Service where encounter took place:  [facility] ([facility] [[id number]]    HPI:    Lara Wills is a 89 year old  (6/12/1922), who is being seen today for an episodic care visit.  HPI information obtained from: facility chart records, facility staff, patient report and [facility] chart review.Today's concern is:     Malignant neoplasm of transverse colon (H)  Abdominal pain, generalized  Benign essential hypertension  Chronic constipation  Generalized anxiety disorder  Major depressive disorder, single episode, mild (H)  Chronic back pain, unspecified back location, unspecified back pain laterality     Patient continues to have complaints of pain in her stomach which is limiting her ability to sleep but she is observed to be sleeping much of the night.  She is receiving several norco doses per day, and is somewhat confused.  She is also exhibiting symptoms of paranoia with family.  Goal to return to assisted living facility next week, with support of home care or potentially hospice.      ALLERGIES: Codeine sulfate and Penicillins  Past Medical, Surgical, Family and Social History reviewed and updated in [facility].    Current Outpatient Medications   Medication Sig Dispense Refill     acetaminophen (TYLENOL) 500 MG tablet Take 1,000 mg by mouth every 6 hours        amLODIPine (NORVASC) 5 MG tablet TAKE 1 TABLET BY MOUTH TWICE DAILY 180 tablet 1     brimonidine (ALPHAGAN) 0.2 % ophthalmic solution Place 1 drop into both eyes 2 times daily        busPIRone (BUSPAR) 5 MG tablet Take 5 mg by mouth daily       docusate sodium (COLACE) 100 MG capsule Take 100 mg by mouth 3 times daily 0800, 1200, 2000       DULoxetine (CYMBALTA) 60 MG EC capsule Take 60 mg by mouth daily       HYDROcodone-acetaminophen (NORCO) 5-325 MG tablet Take 1 tablet by mouth every 4  hours as needed for severe pain        latanoprost (XALATAN) 0.005 % ophthalmic solution Place 1 drop into both eyes At Bedtime 1 Bottle 4     levothyroxine (SYNTHROID/LEVOTHROID) 50 MCG tablet Take 1 tablet (50 mcg) by mouth daily 90 tablet 3     losartan (COZAAR) 25 MG tablet TAKE 1 TABLET (25 MG) BY MOUTH 2 TIMES DAILY 180 tablet 1     metoprolol succinate (TOPROL-XL) 25 MG 24 hr tablet Take 1 tablet (25 mg) by mouth daily 90 tablet 1     mirtazapine (REMERON) 7.5 MG tablet Take 7.5 mg by mouth At Bedtime       omeprazole (PRILOSEC) 20 MG DR capsule Take 20 mg by mouth 2 times daily       order for DME Oxygen 2 Li/min  at night       polyethylene glycol (MIRALAX/GLYCOLAX) powder Take 1 capful by mouth daily       sennosides (SENOKOT) 8.6 MG tablet Take 2 tablets by mouth 2 times daily  60 tablet 0     Medications reviewed:  Medications reconciled to facility chart and changes were made to reflect current medications as identified as above med list. Below are the changes that were made:   Medications stopped since last EPIC medication reconciliation:   There are no discontinued medications.    Medications started since last [facility] medication reconciliation:  No orders of the defined types were placed in this encounter.        REVIEW OF SYSTEMS:  4 point ROS including Respiratory, CV, GI and , other than that noted in the HPI,  is negative    Physical Exam:  /72   Pulse 89   Temp 98.4  F (36.9  C)   Resp 18   Wt 69.4 kg (153 lb)   SpO2 92%   BMI 29.88 kg/m    GENERAL APPEARANCE:  Alert, anxious  RESP:  respiratory effort and palpation of chest normal, lungs clear to auscultation , no respiratory distress  CV:  Palpation and auscultation of heart done , regular rate and rhythm, no murmur, rub, or gallop, no edema  ABDOMEN:  normal bowel sounds, tender in upper quadrants,   M/S:   Gait and station normal  PSYCH:  oriented X 3, insight and judgement impaired, memory impaired     Recent Labs:     CBC  RESULTS:   Recent Labs   Lab Test 01/18/19  0553 01/17/19  0648   WBC 12.0* 10.7   RBC 4.13 4.19   HGB 12.0 12.1   HCT 37.7 38.9   MCV 91 93   MCH 29.1 28.9   MCHC 31.8 31.1*   RDW 12.4 12.3   * 460*       Last Basic Metabolic Panel:  Recent Labs   Lab Test 01/18/19  0553 01/17/19  0648    139   POTASSIUM 3.8 3.8   CHLORIDE 107 104   EVA 7.8* 7.7*   CO2 27 27   BUN 9 11   CR 0.61 0.58   GLC 98 111*       Liver Function Studies -   Recent Labs   Lab Test 01/15/19  0946 10/31/17  1115   PROTTOTAL 6.5* 7.3   ALBUMIN 2.7* 3.6   BILITOTAL 0.4 0.4   ALKPHOS 79 87   AST 14 19   ALT 9 18       TSH   Date Value Ref Range Status   01/15/2019 3.06 0.40 - 4.00 mU/L Final   08/16/2018 7.94 (H) 0.40 - 4.00 mU/L Final   ]    Lab Results   Component Value Date    A1C 5.9 12/05/2013         Assessment/Plan:  Malignant neoplasm of transverse colon (H)  Abdominal pain, generalized  Likely does have some chronic abdominal pain, but not always well controlled with norco which does cause some confusion as well.  Will trial with increase of tylenol and hold norco for a few days    Benign essential hypertension  BP within normal limits, The current medical regimen is effective;  continue present plan and medications.     Chronic constipation  Continues to vary between constipation and reports of diarrhea.  No BM today, small putty like yesterday and x2 diarrhea day before.  She often refuses bowel medications.  Will continue to offer and monitor stools, adjust as able.     Generalized anxiety disorder  Major depressive disorder, single episode, mild (H)  Anxious at times, often calls staff in to sit with her.  She has history of anxiety.  We are weaning buspirone and decreased remeron with goal to simplify regimen.  If needed, may switch from duloxetine to sertraline for better anxiety control in the elderly population.     Chronic back pain, unspecified back location, unspecified back pain laterality  Chronic back pain under  "good control, does have some abdominal pain.  See orders, trying to minimize narcotic use.       Orders:  1. Discontinue \"offer prn narcotics\"  2. Hold all norco orders x 5 days  3. Discontinue tylenol 1000mg  4. Tylenol 1000 mg po Q 6 hr Dx Pain    Electronically signed by  ROSANGELA Kirk CNP    "

## 2019-02-04 NOTE — LETTER
"    2/4/2019        RE: Lara Wills  [address]  [facility] [address]        [facility] [facility]    Chief Complaint   Patient presents with     prison Acute       [facility] Medical Record Number:  [id number]  Place of Service where encounter took place:  [facility] ([facility] [[id number]]    HPI:    Lara Wills is a 89 year old  (6/12/1922), who is being seen today for an episodic care visit.  HPI information obtained from: facility chart records, facility staff, patient report and [facility] chart review.Today's concern is:     Malignant neoplasm of transverse colon (H)  Abdominal pain, generalized  Chronic constipation  Benign essential hypertension  Major depressive disorder, single episode, mild (H)  Generalized anxiety disorder     Patient reports she is continuing to have stomach upset, nausea, poor sleep.  She is worried about discharge to her assisted living facility apartment later this week.  She is worried about the cancer growing in her \"stomach\".  She verbalizes she is ready to die and wishes for it to come soon.      ALLERGIES: Codeine sulfate and Penicillins  Past Medical, Surgical, Family and Social History reviewed and updated in [facility].    Current Outpatient Medications   Medication Sig Dispense Refill     acetaminophen (TYLENOL) 500 MG tablet Take 1,000 mg by mouth every 6 hours        amLODIPine (NORVASC) 5 MG tablet TAKE 1 TABLET BY MOUTH TWICE DAILY 180 tablet 1     brimonidine (ALPHAGAN) 0.2 % ophthalmic solution Place 1 drop into both eyes 2 times daily        docusate sodium (COLACE) 100 MG capsule Take 100 mg by mouth 3 times daily 0800, 1200, 2000       DULoxetine (CYMBALTA) 60 MG EC capsule Take 60 mg by mouth daily       latanoprost (XALATAN) 0.005 % ophthalmic solution Place 1 drop into both eyes At Bedtime 1 Bottle 4     levothyroxine (SYNTHROID/LEVOTHROID) 50 MCG tablet Take 1 tablet (50 mcg) by mouth daily 90 tablet 3     losartan " (COZAAR) 25 MG tablet TAKE 1 TABLET (25 MG) BY MOUTH 2 TIMES DAILY 180 tablet 1     metoprolol succinate (TOPROL-XL) 25 MG 24 hr tablet Take 1 tablet (25 mg) by mouth daily 90 tablet 1     mirtazapine (REMERON) 7.5 MG tablet Take 7.5 mg by mouth At Bedtime       omeprazole (PRILOSEC) 20 MG DR capsule Take 20 mg by mouth 2 times daily       ondansetron (ZOFRAN) 4 MG tablet Take by mouth every morning       order for DME Oxygen 2 Li/min  at night       polyethylene glycol (MIRALAX/GLYCOLAX) powder Take 1 capful by mouth daily       sennosides (SENOKOT) 8.6 MG tablet Take 2 tablets by mouth 2 times daily  60 tablet 0     Medications reviewed:  Medications reconciled to facility chart and changes were made to reflect current medications as identified as above med list. Below are the changes that were made:   Medications stopped since last EPIC medication reconciliation:   There are no discontinued medications.    Medications started since last [facility] medication reconciliation:  No orders of the defined types were placed in this encounter.        REVIEW OF SYSTEMS:  4 point ROS including Respiratory, CV, GI and , other than that noted in the HPI,  is negative    Physical Exam:  /65   Pulse 89   Temp 96  F (35.6  C)   Resp 17   Wt 69.4 kg (153 lb)   SpO2 96%   BMI 29.88 kg/m     GENERAL APPEARANCE:  Alert, anxious, reports nausea, ate breakfast  RESP:  respiratory effort and palpation of chest normal, lungs clear to auscultation , no respiratory distress  CV:  Palpation and auscultation of heart done , regular rate and rhythm, no murmur, rub, or gallop, peripheral edema trace+ in bilateral LE  M/S:   Gait and station abnormal walking with rolling walker, fatigues easily and rests every 100 ft or so  PSYCH:  oriented X 3    Recent Labs:     CBC RESULTS:   Recent Labs   Lab Test 01/31/19  1144 01/18/19  0553 01/17/19  0648   WBC  --  12.0* 10.7   RBC  --  4.13 4.19   HGB 11.9 12.0 12.1   HCT  --  37.7 38.9   MCV   --  91 93   MCH  --  29.1 28.9   MCHC  --  31.8 31.1*   RDW  --  12.4 12.3   PLT  --  502* 460*       Last Basic Metabolic Panel:  Recent Labs   Lab Test 01/31/19  1144 01/18/19  0553    141   POTASSIUM 3.6 3.8   CHLORIDE 100 107   EVA 8.6 7.8*   CO2 27 27   BUN 17 9   CR 0.62 0.61   GLC 93 98         Assessment/Plan:  Malignant neoplasm of transverse colon (H)  Abdominal pain, generalized  Ongoing pain/nausea dependent on her description.  She reports poor appetite and some nausea. Eating fairly well by nursing reports.  She does have abdominal tenderness.  Pain/feelings of well being not improved with norco and likely contributing to constipation issues, as well as some increased confusion  -discontinue norco  -schedule zofran    Chronic constipation  Ongoing constipation and she chooses when/if she will take scheduled bowel medications. Stable now.     Benign essential hypertension  Stable on current regimen. The current medical regimen is effective;  continue present plan and medications.     Major depressive disorder, single episode, mild (H)  Generalized anxiety disorder  Completed wean of buspirone without significant increase in anxiety. Still on duloxetine, low dose mirtazapine. The current medical regimen is effective;  continue present plan and medications. Consider change of duloxetine to zoloft      Orders:  1. Discontinue Norco and all related orders  2. Zofran 4 mg po QAM Dx nausea    Electronically signed by  ROSANGELA Kirk CNP                      Sincerely,        ROSANGELA Kirk CNP

## 2019-02-04 NOTE — PROGRESS NOTES
"[place] [facility]    Chief Complaint   Patient presents with     senior care Acute       [place] Medical Record Number:  [id number]  Place of Service where encounter took place:  [facility] ([facility] [[id number]]    HPI:    Lara Wills is a 89 year old  (6/12/1922), who is being seen today for an episodic care visit.  HPI information obtained from: facility chart records, facility staff, patient report and [facility] chart review.Today's concern is:     Malignant neoplasm of transverse colon (H)  Abdominal pain, generalized  Chronic constipation  Benign essential hypertension  Major depressive disorder, single episode, mild (H)  Generalized anxiety disorder     Patient reports she is continuing to have stomach upset, nausea, poor sleep.  She is worried about discharge to her assisted living facility apartment later this week.  She is worried about the cancer growing in her \"stomach\".  She verbalizes she is ready to die and wishes for it to come soon.      ALLERGIES: Codeine sulfate and Penicillins  Past Medical, Surgical, Family and Social History reviewed and updated in [facility].    Current Outpatient Medications   Medication Sig Dispense Refill     acetaminophen (TYLENOL) 500 MG tablet Take 1,000 mg by mouth every 6 hours        amLODIPine (NORVASC) 5 MG tablet TAKE 1 TABLET BY MOUTH TWICE DAILY 180 tablet 1     brimonidine (ALPHAGAN) 0.2 % ophthalmic solution Place 1 drop into both eyes 2 times daily        docusate sodium (COLACE) 100 MG capsule Take 100 mg by mouth 3 times daily 0800, 1200, 2000       DULoxetine (CYMBALTA) 60 MG EC capsule Take 60 mg by mouth daily       latanoprost (XALATAN) 0.005 % ophthalmic solution Place 1 drop into both eyes At Bedtime 1 Bottle 4     levothyroxine (SYNTHROID/LEVOTHROID) 50 MCG tablet Take 1 tablet (50 mcg) by mouth daily 90 tablet 3     losartan (COZAAR) 25 MG tablet TAKE 1 TABLET (25 MG) BY MOUTH 2 TIMES DAILY 180 tablet 1     metoprolol " succinate (TOPROL-XL) 25 MG 24 hr tablet Take 1 tablet (25 mg) by mouth daily 90 tablet 1     mirtazapine (REMERON) 7.5 MG tablet Take 7.5 mg by mouth At Bedtime       omeprazole (PRILOSEC) 20 MG DR capsule Take 20 mg by mouth 2 times daily       ondansetron (ZOFRAN) 4 MG tablet Take by mouth every morning       order for DME Oxygen 2 Li/min  at night       polyethylene glycol (MIRALAX/GLYCOLAX) powder Take 1 capful by mouth daily       sennosides (SENOKOT) 8.6 MG tablet Take 2 tablets by mouth 2 times daily  60 tablet 0     Medications reviewed:  Medications reconciled to facility chart and changes were made to reflect current medications as identified as above med list. Below are the changes that were made:   Medications stopped since last EPIC medication reconciliation:   There are no discontinued medications.    Medications started since last [facility] medication reconciliation:  No orders of the defined types were placed in this encounter.        REVIEW OF SYSTEMS:  4 point ROS including Respiratory, CV, GI and , other than that noted in the HPI,  is negative    Physical Exam:  /65   Pulse 89   Temp 96  F (35.6  C)   Resp 17   Wt 69.4 kg (153 lb)   SpO2 96%   BMI 29.88 kg/m    GENERAL APPEARANCE:  Alert, anxious, reports nausea, ate breakfast  RESP:  respiratory effort and palpation of chest normal, lungs clear to auscultation , no respiratory distress  CV:  Palpation and auscultation of heart done , regular rate and rhythm, no murmur, rub, or gallop, peripheral edema trace+ in bilateral LE  M/S:   Gait and station abnormal walking with rolling walker, fatigues easily and rests every 100 ft or so  PSYCH:  oriented X 3    Recent Labs:     CBC RESULTS:   Recent Labs   Lab Test 01/31/19  1144 01/18/19  0553 01/17/19  0648   WBC  --  12.0* 10.7   RBC  --  4.13 4.19   HGB 11.9 12.0 12.1   HCT  --  37.7 38.9   MCV  --  91 93   MCH  --  29.1 28.9   MCHC  --  31.8 31.1*   RDW  --  12.4 12.3   PLT  --  502*  460*       Last Basic Metabolic Panel:  Recent Labs   Lab Test 01/31/19  1144 01/18/19  0553    141   POTASSIUM 3.6 3.8   CHLORIDE 100 107   EVA 8.6 7.8*   CO2 27 27   BUN 17 9   CR 0.62 0.61   GLC 93 98         Assessment/Plan:  Malignant neoplasm of transverse colon (H)  Abdominal pain, generalized  Ongoing pain/nausea dependent on her description.  She reports poor appetite and some nausea. Eating fairly well by nursing reports.  She does have abdominal tenderness.  Pain/feelings of well being not improved with norco and likely contributing to constipation issues, as well as some increased confusion  -discontinue norco  -schedule zofran    Chronic constipation  Ongoing constipation and she chooses when/if she will take scheduled bowel medications. Stable now.     Benign essential hypertension  Stable on current regimen. The current medical regimen is effective;  continue present plan and medications.     Major depressive disorder, single episode, mild (H)  Generalized anxiety disorder  Completed wean of buspirone without significant increase in anxiety. Still on duloxetine, low dose mirtazapine. The current medical regimen is effective;  continue present plan and medications. Consider change of duloxetine to zoloft      Orders:  1. Discontinue Norco and all related orders  2. Zofran 4 mg po QAM Dx nausea    Electronically signed by  ROSANGELA Kirk CNP

## 2019-02-07 NOTE — PROGRESS NOTES
[place] [facility] DISCHARGE SUMMARY    PATIENT'S NAME: Lara Wills  YOB: 1922  MEDICAL RECORD NUMBER:  [id number]  Place of Service where encounter took place:  [facility] ([facility] [[id number]]    PRIMARY CARE PROVIDER AND CLINIC RESPONSIBLE AFTER TRANSFER: Alejandra Watson [id number] [facility] [id number]     OR may choose to establish care with in house provider, [facility]    TRANSFERRING PROVIDERS: ROSANGELA Kirk CNP,   DATE OF SNF ADMISSION:  January / 18 / 2019  DATE OF SNF (anticipated) DISCHARGE: February / 07 / 2019  DISCHARGE DISPOSITION: [facility] Provider   RECENT HOSPITALIZATION/ED:  [facility]     CODE STATUS/ADVANCE DIRECTIVES DISCUSSION:   DNR / DNI     Allergies   Allergen Reactions     Codeine Sulfate Unknown     Penicillins Unknown     Condition on Discharge:  Stable.  Function:  Ambulates with rolling walker 100 ft  Cognitive Scores: ACL 4.4/5.6, Independent with ADL's    Equipment: walker    DISCHARGE DIAGNOSIS:   1. Malignant neoplasm of transverse colon (H)    2. Abdominal pain, generalized    3. Chronic constipation    4. Benign essential hypertension    5. Pulmonary fibrosis (H)    6. Hypothyroidism, unspecified type    7. Hiatal hernia    8. Major depressive disorder, single episode, mild (H)    9. Generalized anxiety disorder    10. Insomnia, unspecified type    11. Mild cognitive impairment    12. Adult failure to thrive        HPI Nursing Facility Course:  HPI information obtained from: facility chart records, facility staff, patient report and [facility] chart review. Patient had a skilled nursing facility stay from 1/18/2019 until 2/7/2019 for generalized muscle weakness, generalized abdominal pain, and sease of intestine. patient received PT/OT 5 to 7 days a week. Nursing provided medication administration, assistance with activities of daily living, and pain management. Pt is stable at  time of discharge.    Lara has a past medical history significant for HTN, hypothyroidism, anxiety, depression, ILD on chronic O2.  She has had chronic and worsening abdominal discomfort over several months with anorexia and occasional nausea, increased constipation.  She became progressively weaker.  CT scan abdomen showed concentric bowel wall thickening involving the transverse colon with adjacent lymphadenopathy worrisome for colon cancer.  She chose no further work up, no treatment, and was discharged to TCU for ongoing therapy.  She lives alone in assisted living facility apartment.  She has improved in therapy and has returned to baseline functioning, though she remains very nervous about her ability to manage in assisted living facility.  She will return there with supportive services, and could be considered for hospice enrollment in the future when skilled services are completed.  Her goals of care are focused on comfort.     Malignant neoplasm of transverse colon (H)  Abdominal pain, generalized  Patient has ongoing mild abdominal pain, concentrated in the epigastric region.  She does report some nausea as well, so zofran has been scheduled in the am.  This has provided some relief of nausea.      Chronic constipation  She has ongoing constipation and complaints of diarrhea.  She often refuses currently scheduled bowel medications, then develops constipation.  Currently on senna BID, colace TID, miralax BID.  Will need ongoing close supervision to ensure she is adequately moving her bowels regularly to prevent obstruction from colon cancer.      Benign essential hypertension  On amlodipine, losartan, and metoprolol.  BP has been trending within normal limits.  Could consider a taper of amlodipine to discontinue.  Goal BP in this 89-year-old patient is 150/90.   BP Readings from Last 6 Encounters:   02/07/19 117/67   02/04/19 115/65   01/31/19 125/72   01/28/19 129/69   01/23/19 149/83   01/21/19 124/73         Pulmonary fibrosis (H)  History of chronic pulmonary process, previously on oxygen only at night.  She is currently on oxygen most of the time, will take it off while ambulating.  However she likely needs it when she is sleeping/resting in her recliner.  We have not been successful in tapering the oxygen use, so she would likely need to continue most of the time with oxygen    Hypothyroidism, unspecified type  On levothyroxine 50 mcg daily, last TSH 3.06.  Stable. The current medical regimen is effective;  continue present plan and medications.     Hiatal hernia  Incidental finding of hiatal hernia during CT scan.  She does complain of burping and reflux.  Started on omeprazole.  The current medical regimen is effective;  continue present plan and medications.     Major depressive disorder, single episode, mild (H)  Generalized anxiety disorder  Previously on buspirone, duloxetine, mirtazapine.  Her family reports significant chronic ongoing anxiety and depression over the last several years.  Her family also is reported that buspirone has become unavailable from the  and they are interested in a taper to discontinue.  She has been successfully tapered off the buspirone while in TCU, and we have not yet noticed a change in her anxiety level.  She does remain anxious.  She remains on duloxetine 60 mg daily.    Recommendation to consider gradual decrease of duloxetine and very slow start of sertraline which may prove more effective in control of anxiety.  However may need to watch for GI side effects.  Will defer to primary care for ongoing decisions about antidepressants.    Insomnia, unspecified type  She does complain of chronic insomnia, difficulty sleeping.  Nursing documentation of hours of sleep indicates she gets 4-6 hours at night, as well as 2-3 hours during the day and in the evening.  No changes were made to her regimen at this time.     Mild cognitive impairment  ACL as noted above  indicates mild cognitive impairment, MOCA 13/30.  She is generally oriented and able to make her needs known. Maintain safe living situation with goals focused on comfort.     Adult Failure to Thrive  She has demonstrated a mild generalized weakness, anorexia over the last several months.  This is likely due to the presumed colon cancer.  Goal is for comfort focused approach, focusing on pain control and bowel management.  She will discharge with Medicare skilled  home care services, but should be considered for hospice services as her condition declines.      PAST MEDICAL HISTORY:  has no past medical history on file.    DISCHARGE MEDICATIONS:  Current Outpatient Medications   Medication Sig Dispense Refill     acetaminophen (TYLENOL) 500 MG tablet Take 1,000 mg by mouth every 6 hours        amLODIPine (NORVASC) 5 MG tablet TAKE 1 TABLET BY MOUTH TWICE DAILY 180 tablet 1     brimonidine (ALPHAGAN) 0.2 % ophthalmic solution Place 1 drop into both eyes 2 times daily        docusate sodium (COLACE) 100 MG capsule Take 100 mg by mouth 3 times daily 0800, 1200, 2000       DULoxetine (CYMBALTA) 60 MG EC capsule Take 60 mg by mouth daily       latanoprost (XALATAN) 0.005 % ophthalmic solution Place 1 drop into both eyes At Bedtime 1 Bottle 4     levothyroxine (SYNTHROID/LEVOTHROID) 50 MCG tablet Take 1 tablet (50 mcg) by mouth daily 90 tablet 3     Lidocaine-Hydrocortisone Ace 3-0.5 % CREA Place rectally 2 times daily as needed       losartan (COZAAR) 25 MG tablet TAKE 1 TABLET (25 MG) BY MOUTH 2 TIMES DAILY 180 tablet 1     metoprolol succinate (TOPROL-XL) 25 MG 24 hr tablet Take 1 tablet (25 mg) by mouth daily 90 tablet 1     mirtazapine (REMERON) 7.5 MG tablet Take 7.5 mg by mouth At Bedtime       omeprazole (PRILOSEC) 20 MG DR capsule Take 20 mg by mouth 2 times daily       ondansetron (ZOFRAN) 4 MG tablet Take 4 mg by mouth every morning And Q 6 hr prn       order for DME Oxygen 2 Li/min  at night       polyethylene  glycol (MIRALAX/GLYCOLAX) powder Take 1 capful by mouth daily       sennosides (SENOKOT) 8.6 MG tablet Take 2 tablets by mouth 2 times daily  60 tablet 0       MEDICATION CHANGES/RATIONALE:     Buspirone tapered to discontinue due to nonavailability    Norco discontinued, provided little benefit and increased confusion    Discontinued supplements such as calcium, multivitamin, vitamin D-of little benefit at end of life    Mirtazapine decreased to 7.5 mg nightly    Omeprazole 20 mg twice daily added for hiatal hernia    Zofran 4 mg daily and as needed added for nausea    Controlled medications sent with patient:   not applicable/none     ROS:    4 point ROS including Respiratory, CV, GI and , other than that noted in the HPI,  is negative    Physical Exam:   Vitals: /67   Pulse 101   Temp 97.4  F (36.3  C)   Resp 17   Wt 68.9 kg (152 lb)   SpO2 91%   BMI 29.69 kg/m    BMI= Body mass index is 29.69 kg/m .    GENERAL APPEARANCE:  Alert, anxious  RESP:  respiratory effort and palpation of chest normal, lungs clear to auscultation , no respiratory distress  CV:  Palpation and auscultation of heart done , regular rate and rhythm, no murmur, rub, or gallop, no edema  PSYCH:  oriented X 3, insight and judgement impaired    DISCHARGE PLAN:  Occupational Therapy, Physical Therapy, Registered Nurse, Home Health Aide and From:  [facility]  Patient instructed to follow-up with:  PCP in 5-7 days       Current [place] scheduled appointments:  No future appointments.    MTM referral needed and placed by this provider: No    Pending labs: none  SNF labs   Results for orders placed or performed in visit on 01/31/19   Basic metabolic panel   Result Value Ref Range    Sodium 134 133 - 144 mmol/L    Potassium 3.6 3.4 - 5.3 mmol/L    Chloride 100 94 - 109 mmol/L    Carbon Dioxide 27 20 - 32 mmol/L    Anion Gap 7 3 - 14 mmol/L    Glucose 93 70 - 99 mg/dL    Urea Nitrogen 17 7 - 30 mg/dL    Creatinine 0.62 0.52 - 1.04  mg/dL    GFR Estimate 76 >60 mL/min/[1.73_m2]    GFR Estimate If Black 88 >60 mL/min/[1.73_m2]    Calcium 8.6 8.5 - 10.1 mg/dL   Hemoglobin   Result Value Ref Range    Hemoglobin 11.9 11.7 - 15.7 g/dL       Discharge Treatments:  1. Patient may discharge with all current meds and treatments.  2. Please send with patient all medicines, inhalers, eye drops, nasal sprays.  3. [facility] RN/PT/OT/HHA to eval and treat.  4. F/U with PCP in 5-7 days or establish care with in house team.      TOTAL DISCHARGE TIME:   Greater than 30 minutes  Electronically signed by:  ROSANGELA Kirk CNP       Documentation of Face to Face and Certification for Home Health Services    I certify that patient: Lara Wills is under my care and that I, or a nurse practitioner or physician's assistant working with me, had a face-to-face encounter that meets the physician face-to-face encounter requirements with this patient on: 2/7/2019.    This encounter with the patient was in whole, or in part, for the following medical condition, which is the primary reason for home health care: Colon Cancer.    I certify that, based on my findings, the following services are medically necessary home health services: Nursing, Occupational Therapy, Physical Therapy and HHA.    My clinical findings support the need for the above services because: Nurse is needed: To provide caregiver training to assist with: Colon Cancer..., Occupational Therapy Services are needed to assess and treat cognitive ability and address ADL safety due to impairment in Colon Cancer.. and Physical Therapy Services are needed to assess and treat the following functional impairments: Colon Cancer..    Further, I certify that my clinical findings support that this patient is homebound (i.e. absences from home require considerable and taxing effort and are for medical reasons or Christianity services or infrequently or of short duration when for other reasons) because:  Requires assistance of another person or specialized equipment to access medical services because patient: Requires supervision of another for safe transfer...    Based on the above findings. I certify that this patient is confined to the home and needs intermittent skilled nursing care, physical therapy and/or speech therapy.  The patient is under my care, and I have initiated the establishment of the plan of care.  This patient will be followed by a physician who will periodically review the plan of care.  Physician/Provider to provide follow up care: Alejandra Watson    Attending hospital physician (the Medicare certified PEC provider): ROSANGELA Kirk CNP   Physician Signature: See electronic signature associated with these discharge orders.  Date: 2/7/2019

## 2019-02-07 NOTE — LETTER
2/7/2019        RE: Lara Wills  [address]  [facility] [address]          [facility] DISCHARGE SUMMARY    PATIENT'S NAME: Lara Wills  YOB: 1922  MEDICAL RECORD NUMBER:  [id number]  Place of Service where encounter took place:  [facility] [[id number]]    PRIMARY CARE PROVIDER AND CLINIC RESPONSIBLE AFTER TRANSFER: Alejandra Watson [id number] [address] / [facility] [id number]     OR may choose to establish care with in house provider, [facility]    TRANSFERRING PROVIDERS: ROSANGELA Kirk CNP,   DATE OF SNF ADMISSION:  January / 18 / 2019  DATE OF SNF (anticipated) DISCHARGE: February / 07 / 2019  DISCHARGE DISPOSITION: [facility] Provider   RECENT HOSPITALIZATION/ED:  [facility]     CODE STATUS/ADVANCE DIRECTIVES DISCUSSION:   DNR / DNI     Allergies   Allergen Reactions     Codeine Sulfate Unknown     Penicillins Unknown     Condition on Discharge:  Stable.  Function:  Ambulates with rolling walker 100 ft  Cognitive Scores: ACL 4.4/5.6, Independent with ADL's    Equipment: walker    DISCHARGE DIAGNOSIS:   1. Malignant neoplasm of transverse colon (H)    2. Abdominal pain, generalized    3. Chronic constipation    4. Benign essential hypertension    5. Pulmonary fibrosis (H)    6. Hypothyroidism, unspecified type    7. Hiatal hernia    8. Major depressive disorder, single episode, mild (H)    9. Generalized anxiety disorder    10. Insomnia, unspecified type    11. Mild cognitive impairment    12. Adult failure to thrive        HPI Nursing Facility Course:  HPI information obtained from: facility chart records, facility staff, patient report and [facility] chart review. Patient had a skilled nursing facility stay from 1/18/2019 until 2/7/2019 for generalized muscle weakness, generalized abdominal pain, and sease of intestine. patient received PT/OT 5 to 7 days a week. Nursing provided  medication administration, assistance with activities of daily living, and pain management. Pt is stable at time of discharge.    Lara has a past medical history significant for HTN, hypothyroidism, anxiety, depression, ILD on chronic O2.  She has had chronic and worsening abdominal discomfort over several months with anorexia and occasional nausea, increased constipation.  She became progressively weaker.  CT scan abdomen showed concentric bowel wall thickening involving the transverse colon with adjacent lymphadenopathy worrisome for colon cancer.  She chose no further work up, no treatment, and was discharged to TCU for ongoing therapy.  She lives alone in assisted living facility apartment.  She has improved in therapy and has returned to baseline functioning, though she remains very nervous about her ability to manage in assisted living facility.  She will return there with supportive services, and could be considered for hospice enrollment in the future when skilled services are completed.  Her goals of care are focused on comfort.     Malignant neoplasm of transverse colon (H)  Abdominal pain, generalized  Patient has ongoing mild abdominal pain, concentrated in the epigastric region.  She does report some nausea as well, so zofran has been scheduled in the am.  This has provided some relief of nausea.      Chronic constipation  She has ongoing constipation and complaints of diarrhea.  She often refuses currently scheduled bowel medications, then develops constipation.  Currently on senna BID, colace TID, miralax BID.  Will need ongoing close supervision to ensure she is adequately moving her bowels regularly to prevent obstruction from colon cancer.      Benign essential hypertension  On amlodipine, losartan, and metoprolol.  BP has been trending within normal limits.  Could consider a taper of amlodipine to discontinue.  Goal BP in this 89-year-old patient is 150/90.   BP Readings from Last 6 Encounters:    02/07/19 117/67   02/04/19 115/65   01/31/19 125/72   01/28/19 129/69   01/23/19 149/83   01/21/19 124/73        Pulmonary fibrosis (H)  History of chronic pulmonary process, previously on oxygen only at night.  She is currently on oxygen most of the time, will take it off while ambulating.  However she likely needs it when she is sleeping/resting in her recliner.  We have not been successful in tapering the oxygen use, so she would likely need to continue most of the time with oxygen    Hypothyroidism, unspecified type  On levothyroxine 50 mcg daily, last TSH 3.06.  Stable. The current medical regimen is effective;  continue present plan and medications.     Hiatal hernia  Incidental finding of hiatal hernia during CT scan.  She does complain of burping and reflux.  Started on omeprazole.  The current medical regimen is effective;  continue present plan and medications.     Major depressive disorder, single episode, mild (H)  Generalized anxiety disorder  Previously on buspirone, duloxetine, mirtazapine.  Her family reports significant chronic ongoing anxiety and depression over the last several years.  Her family also is reported that buspirone has become unavailable from the  and they are interested in a taper to discontinue.  She has been successfully tapered off the buspirone while in TCU, and we have not yet noticed a change in her anxiety level.  She does remain anxious.  She remains on duloxetine 60 mg daily.    Recommendation to consider gradual decrease of duloxetine and very slow start of sertraline which may prove more effective in control of anxiety.  However may need to watch for GI side effects.  Will defer to primary care for ongoing decisions about antidepressants.    Insomnia, unspecified type  She does complain of chronic insomnia, difficulty sleeping.  Nursing documentation of hours of sleep indicates she gets 4-6 hours at night, as well as 2-3 hours during the day and in the  evening.  No changes were made to her regimen at this time.     Mild cognitive impairment  ACL as noted above indicates mild cognitive impairment, MOCA 13/30.  She is generally oriented and able to make her needs known. Maintain safe living situation with goals focused on comfort.     Adult Failure to Thrive  She has demonstrated a mild generalized weakness, anorexia over the last several months.  This is likely due to the presumed colon cancer.  Goal is for comfort focused approach, focusing on pain control and bowel management.  She will discharge with Medicare skilled  home care services, but should be considered for hospice services as her condition declines.      PAST MEDICAL HISTORY:  has no past medical history on file.    DISCHARGE MEDICATIONS:  Current Outpatient Medications   Medication Sig Dispense Refill     acetaminophen (TYLENOL) 500 MG tablet Take 1,000 mg by mouth every 6 hours        amLODIPine (NORVASC) 5 MG tablet TAKE 1 TABLET BY MOUTH TWICE DAILY 180 tablet 1     brimonidine (ALPHAGAN) 0.2 % ophthalmic solution Place 1 drop into both eyes 2 times daily        docusate sodium (COLACE) 100 MG capsule Take 100 mg by mouth 3 times daily 0800, 1200, 2000       DULoxetine (CYMBALTA) 60 MG EC capsule Take 60 mg by mouth daily       latanoprost (XALATAN) 0.005 % ophthalmic solution Place 1 drop into both eyes At Bedtime 1 Bottle 4     levothyroxine (SYNTHROID/LEVOTHROID) 50 MCG tablet Take 1 tablet (50 mcg) by mouth daily 90 tablet 3     Lidocaine-Hydrocortisone Ace 3-0.5 % CREA Place rectally 2 times daily as needed       losartan (COZAAR) 25 MG tablet TAKE 1 TABLET (25 MG) BY MOUTH 2 TIMES DAILY 180 tablet 1     metoprolol succinate (TOPROL-XL) 25 MG 24 hr tablet Take 1 tablet (25 mg) by mouth daily 90 tablet 1     mirtazapine (REMERON) 7.5 MG tablet Take 7.5 mg by mouth At Bedtime       omeprazole (PRILOSEC) 20 MG DR capsule Take 20 mg by mouth 2 times daily       ondansetron (ZOFRAN) 4 MG tablet  Take 4 mg by mouth every morning And Q 6 hr prn       order for DME Oxygen 2 Li/min  at night       polyethylene glycol (MIRALAX/GLYCOLAX) powder Take 1 capful by mouth daily       sennosides (SENOKOT) 8.6 MG tablet Take 2 tablets by mouth 2 times daily  60 tablet 0       MEDICATION CHANGES/RATIONALE:     Buspirone tapered to discontinue due to nonavailability    Norco discontinued, provided little benefit and increased confusion    Discontinued supplements such as calcium, multivitamin, vitamin D-of little benefit at end of life    Mirtazapine decreased to 7.5 mg nightly    Omeprazole 20 mg twice daily added for hiatal hernia    Zofran 4 mg daily and as needed added for nausea    Controlled medications sent with patient:   not applicable/none     ROS:    4 point ROS including Respiratory, CV, GI and , other than that noted in the HPI,  is negative    Physical Exam:   Vitals: /67   Pulse 101   Temp 97.4  F (36.3  C)   Resp 17   Wt 68.9 kg (152 lb)   SpO2 91%   BMI 29.69 kg/m     BMI= Body mass index is 29.69 kg/m .    GENERAL APPEARANCE:  Alert, anxious  RESP:  respiratory effort and palpation of chest normal, lungs clear to auscultation , no respiratory distress  CV:  Palpation and auscultation of heart done , regular rate and rhythm, no murmur, rub, or gallop, no edema  PSYCH:  oriented X 3, insight and judgement impaired    DISCHARGE PLAN:  Occupational Therapy, Physical Therapy, Registered Nurse, Home Health Aide and From:  [facility]  Patient instructed to follow-up with:  PCP in 5-7 days       [facility] scheduled appointments:  No future appointments.    MTM referral needed and placed by this provider: No    Pending labs: none  SNF labs   Results for orders placed or performed in visit on 01/31/19   Basic metabolic panel   Result Value Ref Range    Sodium 134 133 - 144 mmol/L    Potassium 3.6 3.4 - 5.3 mmol/L    Chloride 100 94 - 109 mmol/L    Carbon Dioxide 27 20 - 32 mmol/L     Anion Gap 7 3 - 14 mmol/L    Glucose 93 70 - 99 mg/dL    Urea Nitrogen 17 7 - 30 mg/dL    Creatinine 0.62 0.52 - 1.04 mg/dL    GFR Estimate 76 >60 mL/min/[1.73_m2]    GFR Estimate If Black 88 >60 mL/min/[1.73_m2]    Calcium 8.6 8.5 - 10.1 mg/dL   Hemoglobin   Result Value Ref Range    Hemoglobin 11.9 11.7 - 15.7 g/dL       Discharge Treatments:  1. Patient may discharge with all current meds and treatments.  2. Please send with patient all medicines, inhalers, eye drops, nasal sprays.  3. [facility] RN/PT/OT/HHA to eval and treat.  4. F/U with PCP in 5-7 days or establish care with in house team.      TOTAL DISCHARGE TIME:   Greater than 30 minutes  Electronically signed by:  ROSANGELA Kirk CNP       Documentation of Face to Face and Certification for Home Health Services    I certify that patient: Lara Wills is under my care and that I, or a nurse practitioner or physician's assistant working with me, had a face-to-face encounter that meets the physician face-to-face encounter requirements with this patient on: 2/7/2019.    This encounter with the patient was in whole, or in part, for the following medical condition, which is the primary reason for home health care: Colon Cancer.    I certify that, based on my findings, the following services are medically necessary home health services: Nursing, Occupational Therapy, Physical Therapy and HHA.    My clinical findings support the need for the above services because: Nurse is needed: To provide caregiver training to assist with: Colon Cancer..., Occupational Therapy Services are needed to assess and treat cognitive ability and address ADL safety due to impairment in Colon Cancer.. and Physical Therapy Services are needed to assess and treat the following functional impairments: Colon Cancer..    Further, I certify that my clinical findings support that this patient is homebound (i.e. absences from home require considerable and taxing effort and are  for medical reasons or [employer] services or infrequently or of short duration when for other reasons) because: Requires assistance of another person or specialized equipment to access medical services because patient: Requires supervision of another for safe transfer...    Based on the above findings. I certify that this patient is confined to the home and needs intermittent skilled nursing care, physical therapy and/or speech therapy.  The patient is under my care, and I have initiated the establishment of the plan of care.  This patient will be followed by a physician who will periodically review the plan of care.  Physician/Provider to provide follow up care: Alejandra Watson    Attending hospital physician (the Medicare certified [facility] provider): ROSANGELA Kirk CNP   Physician Signature: See electronic signature associated with these discharge orders.  Date: 2/7/2019        Sincerely,        ROSANGELA Kirk CNP

## 2019-02-08 NOTE — TELEPHONE ENCOUNTER
Verbal orders given to DINAH Barakat with [facility] for the following, per [facility] Policy:    Skilled nursing x2 a week for x2 weeks, then x1 a week for x3 weeks. OT to eval the week of 2/11/19.     Dr. Watson, please advise on the following:    Parameters needed for O2 and BP for when to contact PCP.    Writer made RN aware we may not call back until Monday 2/11/19 with the parameters and she was ok with this.    Thanks,     Molly ESTRADA RN

## 2019-02-08 NOTE — TELEPHONE ENCOUNTER
Reason for Call: Request for an order or referral:    Order or referral being requested: skilled nursing x2 a week for x2 weeks, than x1 a week for x3 weeks, OT to eval. Parameters for O2 and BP for when to contact PCP    Date needed: as soon as possible    Has the patient been seen by the PCP for this problem? Not Applicable    Additional comments:     Phone number Patient can be reached at:  Other phone number:  [phone] aria NIX with genny  Best Time:  any    Can we leave a detailed message on this number?  YES    Call taken on 2/8/2019 at 3:40 PM by Jennifer Mccain

## 2019-02-11 NOTE — TELEPHONE ENCOUNTER
LM with Oxygen and B/P parameters for Home Care RN.  RN to call clinic/RN if she has questions. Bryan

## 2019-02-25 ENCOUNTER — MEDICAL CORRESPONDENCE (OUTPATIENT)
Dept: HEALTH INFORMATION MANAGEMENT | Facility: CLINIC | Age: 84
End: 2019-02-25

## 2019-02-26 ENCOUNTER — PATIENT OUTREACH (OUTPATIENT)
Dept: CARE COORDINATION | Facility: CLINIC | Age: 84
End: 2019-02-26

## 2019-02-26 NOTE — PROGRESS NOTES
Clinic Care Coordination Contact  Care Team Conversations    Call made to follow up on Patient.  Patient's Son reported that Patient passed away last Wednesday.    Milad Eason RN  Clinic Care Coordinator  [phone] or [phone]

## 2020-03-02 ENCOUNTER — HEALTH MAINTENANCE LETTER (OUTPATIENT)
Age: 85
End: 2020-03-02

## 2021-05-30 ENCOUNTER — RECORDS - HEALTHEAST (OUTPATIENT)
Dept: ADMINISTRATIVE | Facility: CLINIC | Age: 86
End: 2021-05-30

## 2021-06-01 ENCOUNTER — RECORDS - HEALTHEAST (OUTPATIENT)
Dept: ADMINISTRATIVE | Facility: CLINIC | Age: 86
End: 2021-06-01

## 2021-06-02 ENCOUNTER — RECORDS - HEALTHEAST (OUTPATIENT)
Dept: ADMINISTRATIVE | Facility: CLINIC | Age: 86
End: 2021-06-02

## 2024-06-17 PROBLEM — Z76.89 HEALTH CARE HOME: Status: RESOLVED | Noted: 2018-02-07 | Resolved: 2024-06-17

## (undated) RX ORDER — PROPOFOL 10 MG/ML
INJECTION, EMULSION INTRAVENOUS
Status: DISPENSED
Start: 2018-02-03

## (undated) RX ORDER — LIDOCAINE HYDROCHLORIDE 10 MG/ML
INJECTION, SOLUTION EPIDURAL; INFILTRATION; INTRACAUDAL; PERINEURAL
Status: DISPENSED
Start: 2018-02-03